# Patient Record
Sex: MALE | Race: WHITE | NOT HISPANIC OR LATINO | Employment: UNEMPLOYED | URBAN - METROPOLITAN AREA
[De-identification: names, ages, dates, MRNs, and addresses within clinical notes are randomized per-mention and may not be internally consistent; named-entity substitution may affect disease eponyms.]

---

## 2023-03-03 LAB
RBC, URINE: 1 /HPF (ref 0–5)
URINE CULTURE: NORMAL
WBC, URINE: 1 /HPF (ref 0–5)

## 2023-03-17 RX ORDER — TRAMADOL HYDROCHLORIDE 50 MG/1
1 TABLET ORAL 3 TIMES DAILY PRN
COMMUNITY
Start: 2016-04-20 | End: 2023-04-19

## 2023-04-18 DIAGNOSIS — M25.561 PAIN IN RIGHT KNEE: ICD-10-CM

## 2023-04-18 DIAGNOSIS — G70.00 MYASTHENIA GRAVIS WITHOUT (ACUTE) EXACERBATION (MULTI): ICD-10-CM

## 2023-04-19 RX ORDER — PREDNISONE 1 MG/1
TABLET ORAL
Qty: 120 TABLET | Refills: 0 | Status: SHIPPED | OUTPATIENT
Start: 2023-04-19 | End: 2023-05-24 | Stop reason: SDUPTHER

## 2023-04-19 RX ORDER — TRAMADOL HYDROCHLORIDE 50 MG/1
TABLET ORAL
Qty: 90 TABLET | Refills: 0 | Status: SHIPPED | OUTPATIENT
Start: 2023-04-19 | End: 2023-05-22

## 2023-04-19 RX ORDER — TRAMADOL HYDROCHLORIDE 50 MG/1
1 TABLET ORAL 3 TIMES DAILY
COMMUNITY
Start: 2016-04-20 | End: 2023-08-07 | Stop reason: SDUPTHER

## 2023-04-19 RX ORDER — PREDNISONE 1 MG/1
4 TABLET ORAL DAILY
COMMUNITY
Start: 2022-03-28 | End: 2023-06-21 | Stop reason: SDUPTHER

## 2023-04-24 DIAGNOSIS — E10.69 TYPE 1 DIABETES MELLITUS WITH OTHER SPECIFIED COMPLICATION (MULTI): ICD-10-CM

## 2023-04-24 RX ORDER — EMPAGLIFLOZIN 10 MG/1
1 TABLET, FILM COATED ORAL DAILY
COMMUNITY
Start: 2020-02-11 | End: 2023-04-24 | Stop reason: SDUPTHER

## 2023-04-24 RX ORDER — EMPAGLIFLOZIN 10 MG/1
10 TABLET, FILM COATED ORAL DAILY
Qty: 90 TABLET | Refills: 1 | Status: SHIPPED | OUTPATIENT
Start: 2023-04-24 | End: 2023-05-18

## 2023-05-12 LAB
ALANINE AMINOTRANSFERASE (SGPT) (U/L) IN SER/PLAS: 52 U/L (ref 10–52)
ALBUMIN (G/DL) IN SER/PLAS: 4 G/DL (ref 3.4–5)
ALKALINE PHOSPHATASE (U/L) IN SER/PLAS: 61 U/L (ref 33–136)
ANION GAP IN SER/PLAS: 14 MMOL/L (ref 10–20)
ASPARTATE AMINOTRANSFERASE (SGOT) (U/L) IN SER/PLAS: 25 U/L (ref 9–39)
BILIRUBIN TOTAL (MG/DL) IN SER/PLAS: 0.6 MG/DL (ref 0–1.2)
CALCIDIOL (25 OH VITAMIN D3) (NG/ML) IN SER/PLAS: 43 NG/ML
CALCIUM (MG/DL) IN SER/PLAS: 9.4 MG/DL (ref 8.6–10.6)
CARBON DIOXIDE, TOTAL (MMOL/L) IN SER/PLAS: 32 MMOL/L (ref 21–32)
CHLORIDE (MMOL/L) IN SER/PLAS: 99 MMOL/L (ref 98–107)
CHOLESTEROL (MG/DL) IN SER/PLAS: 220 MG/DL (ref 0–199)
CHOLESTEROL IN HDL (MG/DL) IN SER/PLAS: 43.8 MG/DL
CHOLESTEROL/HDL RATIO: 5
CREATININE (MG/DL) IN SER/PLAS: 0.86 MG/DL (ref 0.5–1.3)
ERYTHROCYTE DISTRIBUTION WIDTH (RATIO) BY AUTOMATED COUNT: 16.1 % (ref 11.5–14.5)
ERYTHROCYTE MEAN CORPUSCULAR HEMOGLOBIN CONCENTRATION (G/DL) BY AUTOMATED: 31.1 G/DL (ref 32–36)
ERYTHROCYTE MEAN CORPUSCULAR VOLUME (FL) BY AUTOMATED COUNT: 89 FL (ref 80–100)
ERYTHROCYTES (10*6/UL) IN BLOOD BY AUTOMATED COUNT: 5.59 X10E12/L (ref 4.5–5.9)
ESTIMATED AVERAGE GLUCOSE FOR HBA1C: 194 MG/DL
GFR MALE: >90 ML/MIN/1.73M2
GLUCOSE (MG/DL) IN SER/PLAS: 131 MG/DL (ref 74–99)
HEMATOCRIT (%) IN BLOOD BY AUTOMATED COUNT: 49.5 % (ref 41–52)
HEMOGLOBIN (G/DL) IN BLOOD: 15.4 G/DL (ref 13.5–17.5)
HEMOGLOBIN A1C/HEMOGLOBIN TOTAL IN BLOOD: 8.4 %
LDL: 139 MG/DL (ref 0–99)
LEUKOCYTES (10*3/UL) IN BLOOD BY AUTOMATED COUNT: 7.9 X10E9/L (ref 4.4–11.3)
NRBC (PER 100 WBCS) BY AUTOMATED COUNT: 0 /100 WBC (ref 0–0)
PLATELETS (10*3/UL) IN BLOOD AUTOMATED COUNT: 216 X10E9/L (ref 150–450)
POTASSIUM (MMOL/L) IN SER/PLAS: 4.2 MMOL/L (ref 3.5–5.3)
PROSTATE SPECIFIC AG (NG/ML) IN SER/PLAS: 4.76 NG/ML (ref 0–4)
PROTEIN TOTAL: 7.3 G/DL (ref 6.4–8.2)
SODIUM (MMOL/L) IN SER/PLAS: 141 MMOL/L (ref 136–145)
THYROTROPIN (MIU/L) IN SER/PLAS BY DETECTION LIMIT <= 0.05 MIU/L: 1.86 MIU/L (ref 0.44–3.98)
TRIGLYCERIDE (MG/DL) IN SER/PLAS: 187 MG/DL (ref 0–149)
UREA NITROGEN (MG/DL) IN SER/PLAS: 18 MG/DL (ref 6–23)
VLDL: 37 MG/DL (ref 0–40)

## 2023-05-15 ENCOUNTER — TELEPHONE (OUTPATIENT)
Dept: PRIMARY CARE | Facility: CLINIC | Age: 68
End: 2023-05-15
Payer: MEDICARE

## 2023-05-15 ENCOUNTER — DOCUMENTATION (OUTPATIENT)
Dept: PRIMARY CARE | Facility: CLINIC | Age: 68
End: 2023-05-15
Payer: MEDICARE

## 2023-05-15 NOTE — RESULT ENCOUNTER NOTE
Hemoglobin A1c at 8.4, above diabetic goal of 7.  We will need to discuss diabetic regimen at upcoming appointment, should be scheduled for 3-month appointment and may.  Please confirm.    LDL at 139, way above diabetic goal of 70.  Should be on Repatha given statin intolerance, as discussed at last appointment.  We will need to follow-up on this and deal with the pharmacist to get covered by insurance.    PSA borderline elevated, continue to follow with urology.    Remaining labs unremarkable.

## 2023-05-15 NOTE — TELEPHONE ENCOUNTER
----- Message from Christopher D'Amico, DO sent at 5/15/2023  9:03 AM EDT -----  Hemoglobin A1c at 8.4, above diabetic goal of 7.  We will need to discuss diabetic regimen at upcoming appointment, should be scheduled for 3-month appointment and may.  Please confirm.    LDL at 139, way above diabetic goal of 70.  Should be on Repatha given statin intolerance, as discussed at last appointment.  We will need to follow-up on this and deal with the pharmacist to get covered by insurance.    PSA borderline elevated, continue to follow with urology.    Remaining labs unremarkable.

## 2023-05-17 ENCOUNTER — OFFICE VISIT (OUTPATIENT)
Dept: PRIMARY CARE | Facility: CLINIC | Age: 68
End: 2023-05-17
Payer: MEDICARE

## 2023-05-17 ENCOUNTER — APPOINTMENT (OUTPATIENT)
Dept: PRIMARY CARE | Facility: CLINIC | Age: 68
End: 2023-05-17
Payer: MEDICARE

## 2023-05-17 VITALS
TEMPERATURE: 97.3 F | DIASTOLIC BLOOD PRESSURE: 72 MMHG | HEART RATE: 78 BPM | BODY MASS INDEX: 41.75 KG/M2 | SYSTOLIC BLOOD PRESSURE: 164 MMHG | RESPIRATION RATE: 17 BRPM | WEIGHT: 315 LBS | HEIGHT: 73 IN

## 2023-05-17 DIAGNOSIS — K21.9 GASTROESOPHAGEAL REFLUX DISEASE, UNSPECIFIED WHETHER ESOPHAGITIS PRESENT: ICD-10-CM

## 2023-05-17 DIAGNOSIS — I10 HYPERTENSION, UNSPECIFIED TYPE: ICD-10-CM

## 2023-05-17 DIAGNOSIS — E66.01 OBESITY, MORBID (MULTI): ICD-10-CM

## 2023-05-17 DIAGNOSIS — E11.9 TYPE 2 DIABETES MELLITUS WITHOUT COMPLICATION, WITHOUT LONG-TERM CURRENT USE OF INSULIN (MULTI): Primary | ICD-10-CM

## 2023-05-17 DIAGNOSIS — G70.00 OCULAR MYASTHENIA GRAVIS (MULTI): ICD-10-CM

## 2023-05-17 DIAGNOSIS — N52.9 ERECTILE DYSFUNCTION, UNSPECIFIED ERECTILE DYSFUNCTION TYPE: ICD-10-CM

## 2023-05-17 DIAGNOSIS — N40.0 BENIGN PROSTATIC HYPERPLASIA, UNSPECIFIED WHETHER LOWER URINARY TRACT SYMPTOMS PRESENT: ICD-10-CM

## 2023-05-17 DIAGNOSIS — R97.20 ELEVATED PSA: ICD-10-CM

## 2023-05-17 DIAGNOSIS — E78.5 HYPERLIPIDEMIA LDL GOAL <100: ICD-10-CM

## 2023-05-17 DIAGNOSIS — M54.50 ACUTE LOW BACK PAIN, UNSPECIFIED BACK PAIN LATERALITY, UNSPECIFIED WHETHER SCIATICA PRESENT: ICD-10-CM

## 2023-05-17 PROBLEM — G56.22 CUBITAL TUNNEL SYNDROME ON LEFT: Status: ACTIVE | Noted: 2023-05-17

## 2023-05-17 PROBLEM — M25.80 SESAMOIDITIS: Status: ACTIVE | Noted: 2023-05-17

## 2023-05-17 PROBLEM — L98.9 SKIN LESION: Status: ACTIVE | Noted: 2023-05-17

## 2023-05-17 PROBLEM — N40.1 BPH WITH OBSTRUCTION/LOWER URINARY TRACT SYMPTOMS: Status: ACTIVE | Noted: 2023-05-17

## 2023-05-17 PROBLEM — R10.11 ACUTE ABDOMINAL PAIN IN RIGHT UPPER QUADRANT: Status: ACTIVE | Noted: 2023-05-17

## 2023-05-17 PROBLEM — M23.92 INTERNAL DERANGEMENT OF LEFT KNEE: Status: ACTIVE | Noted: 2023-05-17

## 2023-05-17 PROBLEM — R19.7 ACUTE DIARRHEA: Status: ACTIVE | Noted: 2023-05-17

## 2023-05-17 PROBLEM — R10.30 GROIN PAIN: Status: ACTIVE | Noted: 2023-05-17

## 2023-05-17 PROBLEM — M62.81 MUSCLE WEAKNESS OF EXTREMITY: Status: ACTIVE | Noted: 2023-05-17

## 2023-05-17 PROBLEM — M76.62 ACHILLES TENDINITIS OF LEFT LOWER EXTREMITY: Status: ACTIVE | Noted: 2023-05-17

## 2023-05-17 PROBLEM — R52 GENERALIZED BODY ACHES: Status: ACTIVE | Noted: 2023-05-17

## 2023-05-17 PROBLEM — R35.0 INCREASED FREQUENCY OF URINATION: Status: ACTIVE | Noted: 2023-05-17

## 2023-05-17 PROBLEM — H02.401 PTOSIS OF RIGHT EYELID: Status: ACTIVE | Noted: 2023-05-17

## 2023-05-17 PROBLEM — R63.5 WEIGHT GAIN: Status: ACTIVE | Noted: 2023-05-17

## 2023-05-17 PROBLEM — G47.00 INSOMNIA: Status: ACTIVE | Noted: 2023-05-17

## 2023-05-17 PROBLEM — G89.29 CHRONIC BACK PAIN: Status: ACTIVE | Noted: 2023-05-17

## 2023-05-17 PROBLEM — M72.2 PLANTAR FASCIITIS, LEFT: Status: ACTIVE | Noted: 2023-05-17

## 2023-05-17 PROBLEM — H51.11 CONVERGENCE INSUFFICIENCY: Status: ACTIVE | Noted: 2023-05-17

## 2023-05-17 PROBLEM — H52.7 REFRACTIVE ERROR: Status: ACTIVE | Noted: 2023-05-17

## 2023-05-17 PROBLEM — M94.261 CHONDROMALACIA OF KNEE, RIGHT: Status: ACTIVE | Noted: 2023-05-17

## 2023-05-17 PROBLEM — R41.840 ATTENTION AND CONCENTRATION DEFICIT: Status: ACTIVE | Noted: 2023-05-17

## 2023-05-17 PROBLEM — M21.6X2 PRONATION OF BOTH FEET: Status: ACTIVE | Noted: 2023-05-17

## 2023-05-17 PROBLEM — M67.02 ACQUIRED SHORT ACHILLES TENDON OF LEFT LOWER EXTREMITY: Status: ACTIVE | Noted: 2023-05-17

## 2023-05-17 PROBLEM — N13.8 BPH WITH OBSTRUCTION/LOWER URINARY TRACT SYMPTOMS: Status: ACTIVE | Noted: 2023-05-17

## 2023-05-17 PROBLEM — H53.8 BLURRED VISION, BILATERAL: Status: ACTIVE | Noted: 2023-05-17

## 2023-05-17 PROBLEM — M17.12 PRIMARY LOCALIZED OSTEOARTHROSIS OF LEFT LOWER LEG: Status: ACTIVE | Noted: 2023-05-17

## 2023-05-17 PROBLEM — F17.201 NICOTINE DEPENDENCE IN REMISSION: Status: ACTIVE | Noted: 2023-05-17

## 2023-05-17 PROBLEM — H53.2 DIPLOPIA: Status: ACTIVE | Noted: 2023-05-17

## 2023-05-17 PROBLEM — M77.50 BONE SPUR OF FOOT: Status: ACTIVE | Noted: 2023-05-17

## 2023-05-17 PROBLEM — M25.561 RIGHT KNEE PAIN: Status: ACTIVE | Noted: 2023-05-17

## 2023-05-17 PROBLEM — M25.532 LEFT WRIST PAIN: Status: ACTIVE | Noted: 2023-05-17

## 2023-05-17 PROBLEM — M54.9 CHRONIC BACK PAIN: Status: ACTIVE | Noted: 2023-05-17

## 2023-05-17 PROBLEM — S80.00XA CONTUSION OF KNEE: Status: ACTIVE | Noted: 2023-05-17

## 2023-05-17 PROBLEM — M62.830 BACK SPASM: Status: ACTIVE | Noted: 2023-05-17

## 2023-05-17 PROBLEM — H25.11 AGE-RELATED NUCLEAR CATARACT OF RIGHT EYE: Status: ACTIVE | Noted: 2023-05-17

## 2023-05-17 PROBLEM — M21.6X1 PRONATION OF BOTH FEET: Status: ACTIVE | Noted: 2023-05-17

## 2023-05-17 PROBLEM — R10.10 PAIN OF UPPER ABDOMEN: Status: ACTIVE | Noted: 2023-05-17

## 2023-05-17 PROBLEM — M25.529 ELBOW PAIN: Status: ACTIVE | Noted: 2023-05-17

## 2023-05-17 PROBLEM — H26.9 CATARACTS, BOTH EYES: Status: ACTIVE | Noted: 2023-05-17

## 2023-05-17 PROBLEM — L30.9 DERMATITIS, ECZEMATOID: Status: ACTIVE | Noted: 2023-05-17

## 2023-05-17 PROBLEM — H25.12 AGE-RELATED NUCLEAR CATARACT OF LEFT EYE: Status: ACTIVE | Noted: 2023-05-17

## 2023-05-17 PROBLEM — E55.9 VITAMIN D DEFICIENCY: Status: ACTIVE | Noted: 2023-05-17

## 2023-05-17 PROBLEM — R20.0 LEFT UPPER EXTREMITY NUMBNESS: Status: ACTIVE | Noted: 2023-05-17

## 2023-05-17 PROBLEM — M79.10 MYALGIA DUE TO STATIN: Status: ACTIVE | Noted: 2023-05-17

## 2023-05-17 PROBLEM — J01.90 ACUTE SINUSITIS: Status: ACTIVE | Noted: 2023-05-17

## 2023-05-17 PROBLEM — R53.83 FATIGUE: Status: ACTIVE | Noted: 2023-05-17

## 2023-05-17 PROBLEM — K43.9 HERNIA OF ANTERIOR ABDOMINAL WALL: Status: ACTIVE | Noted: 2019-05-17

## 2023-05-17 PROBLEM — M25.569 JOINT PAIN, KNEE: Status: ACTIVE | Noted: 2023-05-17

## 2023-05-17 PROBLEM — E53.8 VITAMIN B12 DEFICIENCY: Status: ACTIVE | Noted: 2023-05-17

## 2023-05-17 PROBLEM — M25.519 SHOULDER PAIN: Status: ACTIVE | Noted: 2023-05-17

## 2023-05-17 PROBLEM — R19.09 INGUINAL BULGE: Status: ACTIVE | Noted: 2023-05-17

## 2023-05-17 PROBLEM — T46.6X5A MYALGIA DUE TO STATIN: Status: ACTIVE | Noted: 2023-05-17

## 2023-05-17 PROBLEM — M79.2 NEUROPATHIC PAIN OF LEFT HAND: Status: ACTIVE | Noted: 2023-05-17

## 2023-05-17 PROCEDURE — 99214 OFFICE O/P EST MOD 30 MIN: CPT | Performed by: STUDENT IN AN ORGANIZED HEALTH CARE EDUCATION/TRAINING PROGRAM

## 2023-05-17 PROCEDURE — 3052F HG A1C>EQUAL 8.0%<EQUAL 9.0%: CPT | Performed by: STUDENT IN AN ORGANIZED HEALTH CARE EDUCATION/TRAINING PROGRAM

## 2023-05-17 PROCEDURE — 3078F DIAST BP <80 MM HG: CPT | Performed by: STUDENT IN AN ORGANIZED HEALTH CARE EDUCATION/TRAINING PROGRAM

## 2023-05-17 PROCEDURE — 3077F SYST BP >= 140 MM HG: CPT | Performed by: STUDENT IN AN ORGANIZED HEALTH CARE EDUCATION/TRAINING PROGRAM

## 2023-05-17 PROCEDURE — 1159F MED LIST DOCD IN RCRD: CPT | Performed by: STUDENT IN AN ORGANIZED HEALTH CARE EDUCATION/TRAINING PROGRAM

## 2023-05-17 PROCEDURE — 1160F RVW MEDS BY RX/DR IN RCRD: CPT | Performed by: STUDENT IN AN ORGANIZED HEALTH CARE EDUCATION/TRAINING PROGRAM

## 2023-05-17 RX ORDER — METHYLPREDNISOLONE ACETATE 80 MG/ML
80 INJECTION, SUSPENSION INTRA-ARTICULAR; INTRALESIONAL; INTRAMUSCULAR; SOFT TISSUE ONCE
Status: SHIPPED | OUTPATIENT
Start: 2023-05-17

## 2023-05-17 RX ORDER — IBUPROFEN 100 MG/5ML
1 SUSPENSION, ORAL (FINAL DOSE FORM) ORAL DAILY
COMMUNITY
Start: 2016-04-06

## 2023-05-17 RX ORDER — CHOLECALCIFEROL (VITAMIN D3) 25 MCG
TABLET,CHEWABLE ORAL
COMMUNITY
Start: 2016-07-06

## 2023-05-17 RX ORDER — INSULIN GLARGINE 100 [IU]/ML
100 INJECTION, SOLUTION SUBCUTANEOUS NIGHTLY
COMMUNITY
End: 2023-08-16 | Stop reason: ALTCHOICE

## 2023-05-17 RX ORDER — PREGABALIN 50 MG/1
50 CAPSULE ORAL 3 TIMES DAILY
COMMUNITY
End: 2023-05-24 | Stop reason: SDUPTHER

## 2023-05-17 RX ORDER — CHOLECALCIFEROL (VITAMIN D3) 25 MCG
1 TABLET ORAL DAILY
COMMUNITY

## 2023-05-17 RX ORDER — PERPHENAZINE 16 MG
1 TABLET ORAL DAILY
COMMUNITY
Start: 2016-07-06

## 2023-05-17 RX ORDER — IBUPROFEN 100 MG/5ML
1000 SUSPENSION, ORAL (FINAL DOSE FORM) ORAL
COMMUNITY
End: 2023-05-24 | Stop reason: SDUPTHER

## 2023-05-17 RX ORDER — BLOOD SUGAR DIAGNOSTIC
STRIP MISCELLANEOUS
COMMUNITY
Start: 2018-09-13 | End: 2023-05-24 | Stop reason: ALTCHOICE

## 2023-05-17 RX ORDER — INSULIN LISPRO 100 [IU]/ML
12 INJECTION, SOLUTION SUBCUTANEOUS
COMMUNITY
End: 2023-06-21 | Stop reason: SDUPTHER

## 2023-05-17 RX ORDER — MULTIVITAMIN
1 TABLET ORAL
COMMUNITY

## 2023-05-17 RX ORDER — GLUCOSAMINE/D3/BOSWELLIA SERRA 1500MG-400
TABLET ORAL
COMMUNITY
Start: 2022-03-24

## 2023-05-17 RX ORDER — VIT C/E/ZN/COPPR/LUTEIN/ZEAXAN 250MG-90MG
1000 CAPSULE ORAL
COMMUNITY
End: 2023-05-24 | Stop reason: SDUPTHER

## 2023-05-17 RX ORDER — METFORMIN HYDROCHLORIDE 1000 MG/1
1000 TABLET ORAL
COMMUNITY
End: 2023-06-20 | Stop reason: SDUPTHER

## 2023-05-17 RX ORDER — PREGABALIN 75 MG/1
75 CAPSULE ORAL 2 TIMES DAILY
COMMUNITY
End: 2023-12-26 | Stop reason: SDUPTHER

## 2023-05-17 RX ORDER — TAMSULOSIN HYDROCHLORIDE 0.4 MG/1
2 CAPSULE ORAL DAILY
COMMUNITY
Start: 2013-10-24 | End: 2023-06-20 | Stop reason: SDUPTHER

## 2023-05-17 RX ORDER — ORPHENADRINE CITRATE 100 MG/1
100 TABLET, EXTENDED RELEASE ORAL DAILY
COMMUNITY
End: 2023-06-09 | Stop reason: SDUPTHER

## 2023-05-17 RX ORDER — PYRIDOSTIGMINE BROMIDE 60 MG/1
90 TABLET ORAL 3 TIMES DAILY
COMMUNITY
End: 2024-05-06

## 2023-05-17 RX ORDER — SUCRALFATE 1 G/1
TABLET ORAL
COMMUNITY
Start: 2019-03-12 | End: 2023-10-31 | Stop reason: SDUPTHER

## 2023-05-17 RX ORDER — KETOROLAC TROMETHAMINE 30 MG/ML
60 INJECTION, SOLUTION INTRAMUSCULAR; INTRAVENOUS ONCE
Status: SHIPPED | OUTPATIENT
Start: 2023-05-17 | End: 2023-05-22

## 2023-05-17 RX ORDER — PEN NEEDLE, DIABETIC 32 GX 1/4"
NEEDLE, DISPOSABLE MISCELLANEOUS
COMMUNITY
Start: 2023-05-01 | End: 2023-06-21 | Stop reason: SDUPTHER

## 2023-05-17 ASSESSMENT — PAIN SCALES - GENERAL: PAINLEVEL: 10-WORST PAIN EVER

## 2023-05-17 NOTE — PROGRESS NOTES
"67-year-old male presenting for follow-up on chronic conditions:    DMII  Still taking same insulin regimen, never started GLP-1, only checking blood sugar periodically, reports that it is \"good.\"    HTN  Currently not taking any medications    HLD  Currently unmedicated, reports significant statin intolerance, recommended Repatha, has not done yet.    Acute on chronic back pain  Reports slipping and falling, nothing is helping with the pain.  Take steroid burst in the past for acute flares, requesting today.     12 point ROS reviewed and negative other than as stated in HPI    General: Alert, oriented, pleasant, in no mild distress  HEENT:      Head: normocephalic, atraumatic;      eyes: EOMI, no scleral icterus;   CV: Heart with regular rate and rhythm, normal S1/S2, no murmurs  Lungs: CTAB without wheezing, rhonchi or rales; good respiratory effort, no increased work of breathing  Neuro: Cranial nerves grossly intact; alert and oriented  Psych: Appropriate mood and affect      1.  DMII  -Last A1C: 7.7 Date: 10/07/2022  -Last Albumin/Creat ration: WNL Date: 10/21/21  -Diabetic foot exam: Date:  -Diabetic eye exam: two years ago, ophthalmology referral today  -Current Medications: Lantus 100 units at bedtime, Humalog 12 units with meals, metformin 1000 mg twice daily, Jardiance 10 mg daily  -Statin: None, reports allergy to all statins  -ASA: None  -ACE/ARB: None  -Nutritionist: Referral pending  -Medication changes: Pharmacist met with patient today to discuss initiation of GLP-1, and hopefully reduction in insulin burden, will use continuous glucose monitoring, as there is concern for overuse of insulin and Somogyi effect. Patient is going to start on Ozempic and follow with pharmacy    2.  HTN  -Recommend treatment, declining at this time, significant pain may be contributing, but most office visit readings are above goal    3. HLD  -Has not tolerated every statin  -Recommend Repatha, discussing with " pharmacist     4. BPH  -Continue tamsulosin 0.4 mg 2 tablets daily, following with urology     5.  Acute on chronic low back pain 6. Chronic L knee pain  -CSA and UDS completed today  -Continue tramadol 50 mg 3 times daily as needed  -Depo-Medrol 80 mg IM, Toradol 60 mg IM  -Appropriate warnings given about no other NSAID usage today, and to watch sugars closely     7. ED  -Continue tadalafil 20 mg as needed     8. GERD  -Continue pantoprazole 40 mg daily     9. Ocular MG  -Continue to follow with neurology  -Current medications include prednisone 4 mg daily, pyridostigmine 60 mg 3 times daily    Follow-up 3 months     Christopher D'Amico,

## 2023-05-18 ENCOUNTER — TELEMEDICINE (OUTPATIENT)
Dept: PHARMACY | Facility: HOSPITAL | Age: 68
End: 2023-05-18
Payer: MEDICARE

## 2023-05-18 DIAGNOSIS — E78.5 HYPERLIPIDEMIA LDL GOAL <100: ICD-10-CM

## 2023-05-18 DIAGNOSIS — E11.9 TYPE 2 DIABETES MELLITUS WITHOUT COMPLICATION, WITHOUT LONG-TERM CURRENT USE OF INSULIN (MULTI): Primary | ICD-10-CM

## 2023-05-18 RX ORDER — EVOLOCUMAB 140 MG/ML
140 INJECTION, SOLUTION SUBCUTANEOUS
Qty: 2 ML | Refills: 11 | Status: SHIPPED | OUTPATIENT
Start: 2023-05-18 | End: 2023-05-18

## 2023-05-18 RX ORDER — SEMAGLUTIDE 0.68 MG/ML
0.25 INJECTION, SOLUTION SUBCUTANEOUS
Qty: 3 ML | Refills: 0 | Status: SHIPPED | OUTPATIENT
Start: 2023-05-18 | End: 2023-07-05 | Stop reason: DRUGHIGH

## 2023-05-18 NOTE — PROGRESS NOTES
Pharmacist Clinic: Diabetes Management  Shawn Romero is a 67 y.o. male was referred to Clinical Pharmacy Team for diabetes management. At last visit, ozempic was to be initiated - patient did not answer pharmacy team follow up calls. Patient communicates best via e-mail and scheduled calls.    Referring Provider: Christopher D'Amico, DO     HISTORY OF PRESENT ILLNESS  Patient is an insulin dependent T2DM. His meds are high cost and he is hesitant to start new ones d/t cost.    LAB REVIEW   Glucose (mg/dL)   Date Value   05/12/2023 131 (H)   10/07/2022 179 (H)   06/21/2022 142 (H)     Hemoglobin A1C (%)   Date Value   05/12/2023 8.4 (A)   10/07/2022 7.7 (A)   06/21/2022 8.1 (A)     Bicarbonate (mmol/L)   Date Value   05/12/2023 32   10/07/2022 31   06/21/2022 33 (H)     Urea Nitrogen (mg/dL)   Date Value   05/12/2023 18   10/07/2022 12   06/21/2022 21     Creatinine (mg/dL)   Date Value   05/12/2023 0.86   10/07/2022 0.89   06/21/2022 0.99     Lab Results   Component Value Date    HGBA1C 8.4 (A) 05/12/2023    HGBA1C 7.7 (A) 10/07/2022    HGBA1C 8.1 (A) 06/21/2022     Lab Results   Component Value Date    CHOL 220 (H) 05/12/2023    CHOL 229 (H) 10/07/2022    CHOL 243 (H) 06/21/2022     Lab Results   Component Value Date    HDL 43.8 05/12/2023    HDL 48.9 10/07/2022    HDL 43.6 06/21/2022     Lab Results   Component Value Date    TRIG 187 (H) 05/12/2023    TRIG 195 (H) 10/07/2022    TRIG 202 (H) 06/21/2022     DIABETES ASSESSMENT    CURRENT PHARMACOTHERAPY  - metformin 1000 mg 1 by mouth twice daily   - lantus 100U/3ml 100 units nightly   - jardiance 10 mg daily   - humalog 12 units three times daily    SECONDARY PREVENTION  - Statin? No; patient cannot tolerate statins  - ACE-I/ARB? No    SMBG MEASUREMENTS:   - patient only tests when he feels like his sugars are high or low    Patient does not report symptoms of hypoglycemia. Patient denies dizziness and sweating.  Patient does not report symptoms of hyperglycemia.  Patient denies excessive thirst, fatigue, and polyuria.    RECOMMENDATIONS/PLAN  1. Patients diabetes is poorly controlled with most recent A1c of 8.4 % (goal < 7 %).   - Continue all meds under the continuation of care with the referring provider and clinical pharmacy team.  - Initiate ozempic 0.25 mg under the skin once weekly and plan to titrate to max tolerated dosage   - Initiate repatha 140 mg under the skin every other week  - High cost meds sent to [facility] pharmacy for [facility]    Clinical Pharmacist follow up: 5/23 @ 1 PM    Thank you,  Janessa Caro, PharmD    Verbal consent to manage patient's drug therapy was obtained from the patient. They were informed they may decline to participate or withdraw from participation in pharmacy services at any time.

## 2023-05-21 DIAGNOSIS — M25.561 PAIN IN RIGHT KNEE: ICD-10-CM

## 2023-05-22 RX ORDER — TRAMADOL HYDROCHLORIDE 50 MG/1
TABLET ORAL
Qty: 90 TABLET | Refills: 0 | Status: SHIPPED | OUTPATIENT
Start: 2023-05-22 | End: 2023-05-24 | Stop reason: SDUPTHER

## 2023-05-23 ENCOUNTER — APPOINTMENT (OUTPATIENT)
Dept: PHARMACY | Facility: HOSPITAL | Age: 68
End: 2023-05-23
Payer: MEDICARE

## 2023-05-24 ENCOUNTER — TELEMEDICINE (OUTPATIENT)
Dept: PHARMACY | Facility: HOSPITAL | Age: 68
End: 2023-05-24
Payer: MEDICARE

## 2023-05-24 DIAGNOSIS — E11.9 TYPE 2 DIABETES MELLITUS WITHOUT COMPLICATION, WITHOUT LONG-TERM CURRENT USE OF INSULIN (MULTI): Primary | ICD-10-CM

## 2023-05-24 DIAGNOSIS — E78.5 HYPERLIPIDEMIA LDL GOAL <100: ICD-10-CM

## 2023-05-24 RX ORDER — FLASH GLUCOSE SCANNING READER
EACH MISCELLANEOUS
Qty: 1 EACH | Refills: 0 | Status: SHIPPED | OUTPATIENT
Start: 2023-05-24

## 2023-05-24 RX ORDER — FLASH GLUCOSE SENSOR
KIT MISCELLANEOUS
Qty: 2 EACH | Refills: 11 | Status: SHIPPED | OUTPATIENT
Start: 2023-05-24 | End: 2023-05-24

## 2023-05-24 NOTE — PROGRESS NOTES
Pharmacist Clinic: Diabetes Management  Shawn Romero is a 67 y.o. male was referred to Clinical Pharmacy Team for diabetes management.     Referring Provider: Christopher D'Amico, DO     HISTORY OF PRESENT ILLNESS  Patient is an insulin dependent T2DM. His meds are high cost and he is hesitant to start new ones d/t cost.  Patient is approved for the  PAP.     LAB REVIEW   Glucose (mg/dL)   Date Value   05/12/2023 131 (H)   10/07/2022 179 (H)   06/21/2022 142 (H)     Hemoglobin A1C (%)   Date Value   05/12/2023 8.4 (A)   10/07/2022 7.7 (A)   06/21/2022 8.1 (A)     Bicarbonate (mmol/L)   Date Value   05/12/2023 32   10/07/2022 31   06/21/2022 33 (H)     Urea Nitrogen (mg/dL)   Date Value   05/12/2023 18   10/07/2022 12   06/21/2022 21     Creatinine (mg/dL)   Date Value   05/12/2023 0.86   10/07/2022 0.89   06/21/2022 0.99     Lab Results   Component Value Date    HGBA1C 8.4 (A) 05/12/2023    HGBA1C 7.7 (A) 10/07/2022    HGBA1C 8.1 (A) 06/21/2022     Lab Results   Component Value Date    CHOL 220 (H) 05/12/2023    CHOL 229 (H) 10/07/2022    CHOL 243 (H) 06/21/2022     Lab Results   Component Value Date    HDL 43.8 05/12/2023    HDL 48.9 10/07/2022    HDL 43.6 06/21/2022     Lab Results   Component Value Date    TRIG 187 (H) 05/12/2023    TRIG 195 (H) 10/07/2022    TRIG 202 (H) 06/21/2022     DIABETES ASSESSMENT    CURRENT PHARMACOTHERAPY  - metformin 1000 mg 1 by mouth twice daily   - lantus 100U/3ml 100 units nightly   - jardiance 10 mg daily   - humalog 12 units three times daily    SECONDARY PREVENTION  - Statin? No; patient cannot tolerate statins  - ACE-I/ARB? No    SMBG MEASUREMENTS:   - patient only tests when he feels like his sugars are high or low    Patient does not report symptoms of hypoglycemia. Patient denies dizziness and sweating.  Patient does not report symptoms of hyperglycemia. Patient denies excessive thirst, fatigue, and polyuria.    RECOMMENDATIONS/PLAN  1. Patients diabetes is poorly  controlled with most recent A1c of 8.4 % (goal < 7 %).   - Continue all meds under the continuation of care with the referring provider and clinical pharmacy team.  - Initiate ozempic 0.25 mg under the skin once weekly and plan to titrate to max tolerated dosage. Will be delivered to patient this week.   - Initiate repatha 140 mg under the skin every other week. Will be delivered to patient this week.   - Start using the CGM to test your blood sugar.     Clinical Pharmacist follow up: 6/7 @ 11 AM    Thank you,  Janessa Caro, PharmD    Verbal consent to manage patient's drug therapy was obtained from the patient. They were informed they may decline to participate or withdraw from participation in pharmacy services at any time.

## 2023-05-30 ENCOUNTER — TELEPHONE (OUTPATIENT)
Dept: PRIMARY CARE | Facility: CLINIC | Age: 68
End: 2023-05-30
Payer: MEDICARE

## 2023-05-31 DIAGNOSIS — M54.40 LOW BACK PAIN WITH SCIATICA, SCIATICA LATERALITY UNSPECIFIED, UNSPECIFIED BACK PAIN LATERALITY, UNSPECIFIED CHRONICITY: ICD-10-CM

## 2023-05-31 RX ORDER — ORPHENADRINE CITRATE 100 MG/1
100 TABLET, EXTENDED RELEASE ORAL DAILY
Qty: 90 TABLET | Refills: 0 | OUTPATIENT
Start: 2023-05-31

## 2023-06-07 ENCOUNTER — TELEMEDICINE (OUTPATIENT)
Dept: PHARMACY | Facility: HOSPITAL | Age: 68
End: 2023-06-07
Payer: MEDICARE

## 2023-06-07 DIAGNOSIS — E11.9 TYPE 2 DIABETES MELLITUS WITHOUT COMPLICATION, WITHOUT LONG-TERM CURRENT USE OF INSULIN (MULTI): Primary | ICD-10-CM

## 2023-06-07 NOTE — PROGRESS NOTES
Pharmacist Clinic: Diabetes Management  Shawn Romero is a 67 y.o. male was referred to Clinical Pharmacy Team for diabetes management. Since last visit, patient was approved for the  PAP for his repatha, jardiance, and ozempic.     Referring Provider: Christopher D'Amico,      LAB REVIEW   Glucose (mg/dL)   Date Value   05/12/2023 131 (H)   10/07/2022 179 (H)   06/21/2022 142 (H)     Hemoglobin A1C (%)   Date Value   05/12/2023 8.4 (A)   10/07/2022 7.7 (A)   06/21/2022 8.1 (A)     Bicarbonate (mmol/L)   Date Value   05/12/2023 32   10/07/2022 31   06/21/2022 33 (H)     Urea Nitrogen (mg/dL)   Date Value   05/12/2023 18   10/07/2022 12   06/21/2022 21     Creatinine (mg/dL)   Date Value   05/12/2023 0.86   10/07/2022 0.89   06/21/2022 0.99     Lab Results   Component Value Date    HGBA1C 8.4 (A) 05/12/2023    HGBA1C 7.7 (A) 10/07/2022    HGBA1C 8.1 (A) 06/21/2022     Lab Results   Component Value Date    CHOL 220 (H) 05/12/2023    CHOL 229 (H) 10/07/2022    CHOL 243 (H) 06/21/2022     Lab Results   Component Value Date    HDL 43.8 05/12/2023    HDL 48.9 10/07/2022    HDL 43.6 06/21/2022     Lab Results   Component Value Date    TRIG 187 (H) 05/12/2023    TRIG 195 (H) 10/07/2022    TRIG 202 (H) 06/21/2022     DIABETES ASSESSMENT    CURRENT PHARMACOTHERAPY  - metformin 1000 mg 1 by mouth twice daily   - lantus 100U/3ml 100 units nightly   - jardiance 10 mg daily   - humalog 12 units three times daily  - ozempic 0.25 mg under the skin once weekly, first dose to be given today    SECONDARY PREVENTION  - Statin? No; patient cannot tolerate statins  -- repatha started today (6/7)  - ACE-I/ARB? No    SMBG MEASUREMENTS:   - patient has not been testing his sugars since last speaking     Patient does not report symptoms of hypoglycemia. Patient denies dizziness and sweating.  Patient does not report symptoms of hyperglycemia. Patient denies excessive thirst, fatigue, and polyuria.    Patient inquired about his msucle  relaxer, he says his old neurologist took him off soma and put him on norflex. Advised he should defer to current neurologist (dr. Charbel burr) for a safe muscle relaxer given his MG.     RECOMMENDATIONS/PLAN  1. Patients diabetes is poorly controlled with most recent A1c of 8.4 % (goal < 7 %).   - Continue all meds under the continuation of care with the referring provider and clinical pharmacy team.  - Since last speaking, patient did not start his new medications. Discussed how to use medications today, patient will start them today.     Clinical Pharmacist follow up: 6/21 @ 11 AM    Thank you,  Janessa Caro, PharmD    Verbal consent to manage patient's drug therapy was obtained from the patient. They were informed they may decline to participate or withdraw from participation in pharmacy services at any time.

## 2023-06-09 DIAGNOSIS — M54.50 ACUTE LOW BACK PAIN, UNSPECIFIED BACK PAIN LATERALITY, UNSPECIFIED WHETHER SCIATICA PRESENT: Primary | ICD-10-CM

## 2023-06-09 RX ORDER — ORPHENADRINE CITRATE 100 MG/1
100 TABLET, EXTENDED RELEASE ORAL DAILY
Qty: 30 TABLET | Refills: 1 | Status: SHIPPED | OUTPATIENT
Start: 2023-06-09 | End: 2023-08-07 | Stop reason: SDUPTHER

## 2023-06-20 DIAGNOSIS — N40.0 BENIGN PROSTATIC HYPERPLASIA, UNSPECIFIED WHETHER LOWER URINARY TRACT SYMPTOMS PRESENT: Primary | ICD-10-CM

## 2023-06-20 DIAGNOSIS — E11.69 TYPE 2 DIABETES MELLITUS WITH OTHER SPECIFIED COMPLICATION, UNSPECIFIED WHETHER LONG TERM INSULIN USE (MULTI): ICD-10-CM

## 2023-06-20 RX ORDER — METFORMIN HYDROCHLORIDE 1000 MG/1
1000 TABLET ORAL
Qty: 60 TABLET | Refills: 3 | Status: SHIPPED | OUTPATIENT
Start: 2023-06-20 | End: 2023-10-31 | Stop reason: SDUPTHER

## 2023-06-20 RX ORDER — TAMSULOSIN HYDROCHLORIDE 0.4 MG/1
0.8 CAPSULE ORAL DAILY
Qty: 180 CAPSULE | Refills: 1 | Status: SHIPPED | OUTPATIENT
Start: 2023-06-20 | End: 2023-07-24 | Stop reason: SDUPTHER

## 2023-06-21 ENCOUNTER — TELEMEDICINE (OUTPATIENT)
Dept: PHARMACY | Facility: HOSPITAL | Age: 68
End: 2023-06-21
Payer: MEDICARE

## 2023-06-21 DIAGNOSIS — E11.9 TYPE 2 DIABETES MELLITUS WITHOUT COMPLICATION, WITHOUT LONG-TERM CURRENT USE OF INSULIN (MULTI): Primary | ICD-10-CM

## 2023-06-21 RX ORDER — PEN NEEDLE, DIABETIC 32 GX 1/4"
NEEDLE, DISPOSABLE MISCELLANEOUS
Qty: 100 EACH | Refills: 11 | Status: SHIPPED | OUTPATIENT
Start: 2023-06-21 | End: 2023-08-16 | Stop reason: ALTCHOICE

## 2023-06-21 RX ORDER — PREDNISONE 1 MG/1
4 TABLET ORAL DAILY
Qty: 120 TABLET | Refills: 0 | Status: SHIPPED | OUTPATIENT
Start: 2023-06-21 | End: 2023-10-25 | Stop reason: SDUPTHER

## 2023-06-21 RX ORDER — INSULIN LISPRO 100 [IU]/ML
12 INJECTION, SOLUTION SUBCUTANEOUS
Qty: 30 ML | Refills: 1 | Status: SHIPPED | OUTPATIENT
Start: 2023-06-21 | End: 2023-08-16 | Stop reason: SDUPTHER

## 2023-06-21 RX ORDER — PEN NEEDLE, DIABETIC 30 GX3/16"
NEEDLE, DISPOSABLE MISCELLANEOUS
Qty: 100 EACH | Refills: 11 | Status: SHIPPED | OUTPATIENT
Start: 2023-06-21 | End: 2023-08-16 | Stop reason: ALTCHOICE

## 2023-06-21 NOTE — PROGRESS NOTES
Pharmacist Clinic: Diabetes Management  Shawn Romero is a 67 y.o. male was referred to Clinical Pharmacy Team for diabetes management. Since last visit, patient started ozempic and repatha.     Referring Provider: Christopher D'Amico, DO     LAB REVIEW   Glucose (mg/dL)   Date Value   06/16/2023 151 (H)   05/12/2023 131 (H)   10/07/2022 179 (H)     Hemoglobin A1C (%)   Date Value   05/12/2023 8.4 (A)   10/07/2022 7.7 (A)   06/21/2022 8.1 (A)     Bicarbonate (mmol/L)   Date Value   06/16/2023 36 (H)   05/12/2023 32   10/07/2022 31     Urea Nitrogen (mg/dL)   Date Value   06/16/2023 18   05/12/2023 18   10/07/2022 12     Creatinine (mg/dL)   Date Value   06/16/2023 0.92   05/12/2023 0.86   10/07/2022 0.89     Lab Results   Component Value Date    HGBA1C 8.4 (A) 05/12/2023    HGBA1C 7.7 (A) 10/07/2022    HGBA1C 8.1 (A) 06/21/2022     Lab Results   Component Value Date    CHOL 220 (H) 05/12/2023    CHOL 229 (H) 10/07/2022    CHOL 243 (H) 06/21/2022     Lab Results   Component Value Date    HDL 43.8 05/12/2023    HDL 48.9 10/07/2022    HDL 43.6 06/21/2022     Lab Results   Component Value Date    TRIG 187 (H) 05/12/2023    TRIG 195 (H) 10/07/2022    TRIG 202 (H) 06/21/2022     DIABETES ASSESSMENT    CURRENT PHARMACOTHERAPY  - metformin 1000 mg 1 by mouth twice daily   - lantus 100U/3ml 100 units nightly   - jardiance 10 mg daily   - humalog 12 units three times daily  - ozempic 0.25 mg under the skin once weekly (injects on wednesdays)    SECONDARY PREVENTION  - Statin? No; patient cannot tolerate statins, on PCSK9i  - ACE-I/ARB? No    SMBG MEASUREMENTS:   - last weeks AVG of 168  - week prior AVG of 180    DISCUSSION  - Patient is tolerating ozempic, he states his appetite is down. He is down 9 pounds.   - Patient loves his CGM, he states he is able to check his BG all day long and it helps him stay organized   - Discussed increasing his dose of ozempic to 0.5 mg once weekly, patient is eager to do so      RECOMMENDATIONS/PLAN  1. Patients diabetes is poorly controlled with most recent A1c of 8.4 % (goal < 7 %).   - Continue all meds under the continuation of care with the referring provider and clinical pharmacy team.  - Increase ozempic to 0.5 mg under the skin once weekly.    Clinical Pharmacist follow up: 7/12 @ 11 AM    Thank you,  Janessa Caro, PharmD    Verbal consent to manage patient's drug therapy was obtained from the patient. They were informed they may decline to participate or withdraw from participation in pharmacy services at any time.

## 2023-06-27 ENCOUNTER — HOSPITAL ENCOUNTER (OUTPATIENT)
Dept: DATA CONVERSION | Facility: HOSPITAL | Age: 68
End: 2023-06-27
Attending: STUDENT IN AN ORGANIZED HEALTH CARE EDUCATION/TRAINING PROGRAM | Admitting: STUDENT IN AN ORGANIZED HEALTH CARE EDUCATION/TRAINING PROGRAM
Payer: MEDICARE

## 2023-06-27 DIAGNOSIS — K21.9 GASTRO-ESOPHAGEAL REFLUX DISEASE WITHOUT ESOPHAGITIS: ICD-10-CM

## 2023-06-27 DIAGNOSIS — Z79.84 LONG TERM (CURRENT) USE OF ORAL HYPOGLYCEMIC DRUGS: ICD-10-CM

## 2023-06-27 DIAGNOSIS — G70.00 MYASTHENIA GRAVIS WITHOUT (ACUTE) EXACERBATION (MULTI): ICD-10-CM

## 2023-06-27 DIAGNOSIS — Z88.0 ALLERGY STATUS TO PENICILLIN: ICD-10-CM

## 2023-06-27 DIAGNOSIS — Z79.4 LONG TERM (CURRENT) USE OF INSULIN (MULTI): ICD-10-CM

## 2023-06-27 DIAGNOSIS — E78.5 HYPERLIPIDEMIA, UNSPECIFIED: ICD-10-CM

## 2023-06-27 DIAGNOSIS — E11.9 TYPE 2 DIABETES MELLITUS WITHOUT COMPLICATIONS (MULTI): ICD-10-CM

## 2023-06-27 DIAGNOSIS — M19.90 UNSPECIFIED OSTEOARTHRITIS, UNSPECIFIED SITE: ICD-10-CM

## 2023-06-27 DIAGNOSIS — Z72.0 TOBACCO USE: ICD-10-CM

## 2023-06-27 DIAGNOSIS — Z88.2 ALLERGY STATUS TO SULFONAMIDES: ICD-10-CM

## 2023-06-27 DIAGNOSIS — J45.909 UNSPECIFIED ASTHMA, UNCOMPLICATED (HHS-HCC): ICD-10-CM

## 2023-06-27 DIAGNOSIS — N40.0 BENIGN PROSTATIC HYPERPLASIA WITHOUT LOWER URINARY TRACT SYMPTOMS: ICD-10-CM

## 2023-06-27 LAB
POCT GLUCOSE: 103 MG/DL (ref 74–99)
POCT GLUCOSE: 107 MG/DL (ref 74–99)

## 2023-07-05 ENCOUNTER — TELEMEDICINE (OUTPATIENT)
Dept: PHARMACY | Facility: HOSPITAL | Age: 68
End: 2023-07-05
Payer: MEDICARE

## 2023-07-05 DIAGNOSIS — E11.9 TYPE 2 DIABETES MELLITUS WITHOUT COMPLICATION, WITHOUT LONG-TERM CURRENT USE OF INSULIN (MULTI): Primary | ICD-10-CM

## 2023-07-05 DIAGNOSIS — E11.9 TYPE 2 DIABETES MELLITUS WITHOUT COMPLICATION, WITHOUT LONG-TERM CURRENT USE OF INSULIN (MULTI): ICD-10-CM

## 2023-07-05 RX ORDER — SEMAGLUTIDE 0.68 MG/ML
0.5 INJECTION, SOLUTION SUBCUTANEOUS
Qty: 3 ML | Refills: 2 | Status: SHIPPED | OUTPATIENT
Start: 2023-07-05 | End: 2023-08-02 | Stop reason: DRUGHIGH

## 2023-07-05 NOTE — PROGRESS NOTES
Pharmacist Clinic: Diabetes Management  Shawn Romero is a 68 y.o. male was referred to Clinical Pharmacy Team for diabetes management. Since last visit, patient started decreasing his insulin on his own. He is now taking 75-80 units per day.      Referring Provider: Christopher D'Amico,      LAB REVIEW   Glucose (mg/dL)   Date Value   06/16/2023 151 (H)   05/12/2023 131 (H)   10/07/2022 179 (H)     Hemoglobin A1C (%)   Date Value   05/12/2023 8.4 (A)   10/07/2022 7.7 (A)   06/21/2022 8.1 (A)     Bicarbonate (mmol/L)   Date Value   06/16/2023 36 (H)   05/12/2023 32   10/07/2022 31     Urea Nitrogen (mg/dL)   Date Value   06/16/2023 18   05/12/2023 18   10/07/2022 12     Creatinine (mg/dL)   Date Value   06/16/2023 0.92   05/12/2023 0.86   10/07/2022 0.89     Lab Results   Component Value Date    HGBA1C 8.4 (A) 05/12/2023    HGBA1C 7.7 (A) 10/07/2022    HGBA1C 8.1 (A) 06/21/2022     Lab Results   Component Value Date    CHOL 220 (H) 05/12/2023    CHOL 229 (H) 10/07/2022    CHOL 243 (H) 06/21/2022     Lab Results   Component Value Date    HDL 43.8 05/12/2023    HDL 48.9 10/07/2022    HDL 43.6 06/21/2022     Lab Results   Component Value Date    TRIG 187 (H) 05/12/2023    TRIG 195 (H) 10/07/2022    TRIG 202 (H) 06/21/2022     DIABETES ASSESSMENT    CURRENT PHARMACOTHERAPY  - metformin 1000 mg 1 by mouth twice daily   - lantus 100U/3ml 80 units nightly   - jardiance 10 mg daily   - humalog 12 units three times daily  - ozempic 0.5 mg under the skin once weekly (injects on wednesdays)    SECONDARY PREVENTION  - Statin? No; patient cannot tolerate statins, on PCSK9i  - ACE-I/ARB? No    SMBG MEASUREMENTS:   - last weeks AVG of 140  - week prior AVG of 170    DISCUSSION  - Patient is tolerating increased dose of ozempic without issues   - Patient is down 25 pounds, his sugar readings are getting better   - Patient likes his blood glucose to range around 100 in the morning, he does not like when it gets  lower    RECOMMENDATIONS/PLAN  1. Patients diabetes is poorly controlled with most recent A1c of 8.4 % (goal < 7 %).   - Continue all meds under the continuation of care with the referring provider and clinical pharmacy team.  - Continue ozempic to 0.5 mg under the skin once weekly.    Clinical Pharmacist follow up: 7/12 @ 11 AM    Thank you,  Janessa Caro, PharmD    Verbal consent to manage patient's drug therapy was obtained from the patient. They were informed they may decline to participate or withdraw from participation in pharmacy services at any time.

## 2023-07-12 ENCOUNTER — TELEMEDICINE (OUTPATIENT)
Dept: PHARMACY | Facility: HOSPITAL | Age: 68
End: 2023-07-12
Payer: MEDICARE

## 2023-07-12 DIAGNOSIS — E11.9 TYPE 2 DIABETES MELLITUS WITHOUT COMPLICATION, WITHOUT LONG-TERM CURRENT USE OF INSULIN (MULTI): Primary | ICD-10-CM

## 2023-07-12 NOTE — PROGRESS NOTES
Pharmacist Clinic: Diabetes Management  Shawn Romero is a 68 y.o. male was referred to Clinical Pharmacy Team for diabetes management. Since last visit, patient started decreasing his insulin on his own. He is now taking 60 units per day.      Referring Provider: Christopher D'Amico, DO     HISTORY OF PRESENT ILLNESS  Patient is an insulin dependent T2DM. Historically he was unable to afford meds due to cost. He is approved for the  PAP for ozempic and repatha.     LAB REVIEW   Glucose (mg/dL)   Date Value   06/16/2023 151 (H)   05/12/2023 131 (H)   10/07/2022 179 (H)     Hemoglobin A1C (%)   Date Value   05/12/2023 8.4 (A)   10/07/2022 7.7 (A)   06/21/2022 8.1 (A)     Bicarbonate (mmol/L)   Date Value   06/16/2023 36 (H)   05/12/2023 32   10/07/2022 31     Urea Nitrogen (mg/dL)   Date Value   06/16/2023 18   05/12/2023 18   10/07/2022 12     Creatinine (mg/dL)   Date Value   06/16/2023 0.92   05/12/2023 0.86   10/07/2022 0.89     Lab Results   Component Value Date    HGBA1C 8.4 (A) 05/12/2023    HGBA1C 7.7 (A) 10/07/2022    HGBA1C 8.1 (A) 06/21/2022     Lab Results   Component Value Date    CHOL 220 (H) 05/12/2023    CHOL 229 (H) 10/07/2022    CHOL 243 (H) 06/21/2022     Lab Results   Component Value Date    HDL 43.8 05/12/2023    HDL 48.9 10/07/2022    HDL 43.6 06/21/2022     Lab Results   Component Value Date    TRIG 187 (H) 05/12/2023    TRIG 195 (H) 10/07/2022    TRIG 202 (H) 06/21/2022     DIABETES ASSESSMENT    CURRENT PHARMACOTHERAPY  - metformin 1000 mg 1 by mouth twice daily   - lantus 100U/3ml 65 units nightly   - jardiance 10 mg daily   - humalog 10-14 units with meals  - ozempic 0.5 mg under the skin once weekly (injects on wednesdays)    SECONDARY PREVENTION  - Statin? No; patient cannot tolerate statins, on PCSK9i  - ACE-I/ARB? No    SMBG MEASUREMENTS:   - last weeks AVG of 131  - week prior AVG of 138    DISCUSSION  - Patient is tolerating increased dose of ozempic without issues  - Patient is down  to 292 pounds, he is eating less throughout the day (2 meals at most)   - Patient is decreasing his insulin on his own, we discussed no more than 2-4 units on a weekly basis, however he prefers to go in increments of 5 units    RECOMMENDATIONS/PLAN  1. Patients diabetes is poorly controlled with most recent A1c of 8.4 % (goal < 7 %).   - Continue all meds under the continuation of care with the referring provider and clinical pharmacy team.  - Continue ozempic to 0.5 mg under the skin once weekly.    Clinical Pharmacist follow up: 8/2 @ 11 AM, discuss ozempic dosage increase    Thank you,  Janessa Caro, PharmD    Verbal consent to manage patient's drug therapy was obtained from the patient. They were informed they may decline to participate or withdraw from participation in pharmacy services at any time.

## 2023-07-23 DIAGNOSIS — E11.9 TYPE 2 DIABETES MELLITUS WITHOUT COMPLICATION, WITHOUT LONG-TERM CURRENT USE OF INSULIN (MULTI): ICD-10-CM

## 2023-07-24 DIAGNOSIS — N40.0 BENIGN PROSTATIC HYPERPLASIA, UNSPECIFIED WHETHER LOWER URINARY TRACT SYMPTOMS PRESENT: ICD-10-CM

## 2023-07-24 DIAGNOSIS — T78.40XA ALLERGY, INITIAL ENCOUNTER: ICD-10-CM

## 2023-07-24 DIAGNOSIS — E11.9 TYPE 2 DIABETES MELLITUS WITHOUT COMPLICATION, WITHOUT LONG-TERM CURRENT USE OF INSULIN (MULTI): ICD-10-CM

## 2023-07-24 RX ORDER — TAMSULOSIN HYDROCHLORIDE 0.4 MG/1
0.8 CAPSULE ORAL DAILY
Qty: 180 CAPSULE | Refills: 1 | Status: SHIPPED | OUTPATIENT
Start: 2023-07-24 | End: 2023-08-29 | Stop reason: SDUPTHER

## 2023-07-24 RX ORDER — PREDNISONE 1 MG/1
4 TABLET ORAL DAILY
Qty: 120 TABLET | Refills: 0 | Status: CANCELLED | OUTPATIENT
Start: 2023-07-24

## 2023-07-24 RX ORDER — LORATADINE 10 MG/1
10 TABLET ORAL NIGHTLY
COMMUNITY
Start: 2022-11-12 | End: 2023-07-24 | Stop reason: SDUPTHER

## 2023-07-24 RX ORDER — LORATADINE 10 MG/1
10 TABLET ORAL NIGHTLY
Qty: 90 TABLET | Refills: 3 | Status: SHIPPED | OUTPATIENT
Start: 2023-07-24 | End: 2023-12-12 | Stop reason: SDUPTHER

## 2023-07-25 RX ORDER — PREDNISONE 1 MG/1
4 TABLET ORAL DAILY
Qty: 120 TABLET | Refills: 0 | OUTPATIENT
Start: 2023-07-25

## 2023-08-02 ENCOUNTER — TELEMEDICINE (OUTPATIENT)
Dept: PHARMACY | Facility: HOSPITAL | Age: 68
End: 2023-08-02
Payer: MEDICARE

## 2023-08-02 DIAGNOSIS — E11.9 TYPE 2 DIABETES MELLITUS WITHOUT COMPLICATION, WITHOUT LONG-TERM CURRENT USE OF INSULIN (MULTI): Primary | ICD-10-CM

## 2023-08-02 RX ORDER — SEMAGLUTIDE 1.34 MG/ML
1 INJECTION, SOLUTION SUBCUTANEOUS
Qty: 3 ML | Refills: 2 | Status: SHIPPED | OUTPATIENT
Start: 2023-08-02 | End: 2023-08-16 | Stop reason: DRUGHIGH

## 2023-08-02 NOTE — PROGRESS NOTES
Pharmacist Clinic: Diabetes Management  Shawn Romero is a 68 y.o. male was referred to Clinical Pharmacy Team for diabetes management. Since last visit, patient started decreasing his insulin on his own. He is now taking 60 units per day.      Referring Provider: Christopher D'Amico, DO     HISTORY OF PRESENT ILLNESS  Patient is an insulin dependent T2DM. Historically he was unable to afford meds due to cost. He is approved for the  PAP for ozempic and repatha.     LAB REVIEW   Glucose (mg/dL)   Date Value   06/16/2023 151 (H)   05/12/2023 131 (H)   10/07/2022 179 (H)     Hemoglobin A1C (%)   Date Value   05/12/2023 8.4 (A)   10/07/2022 7.7 (A)   06/21/2022 8.1 (A)     Bicarbonate (mmol/L)   Date Value   06/16/2023 36 (H)   05/12/2023 32   10/07/2022 31     Urea Nitrogen (mg/dL)   Date Value   06/16/2023 18   05/12/2023 18   10/07/2022 12     Creatinine (mg/dL)   Date Value   06/16/2023 0.92   05/12/2023 0.86   10/07/2022 0.89     Lab Results   Component Value Date    HGBA1C 8.4 (A) 05/12/2023    HGBA1C 7.7 (A) 10/07/2022    HGBA1C 8.1 (A) 06/21/2022     Lab Results   Component Value Date    CHOL 220 (H) 05/12/2023    CHOL 229 (H) 10/07/2022    CHOL 243 (H) 06/21/2022     Lab Results   Component Value Date    HDL 43.8 05/12/2023    HDL 48.9 10/07/2022    HDL 43.6 06/21/2022     Lab Results   Component Value Date    TRIG 187 (H) 05/12/2023    TRIG 195 (H) 10/07/2022    TRIG 202 (H) 06/21/2022     DIABETES ASSESSMENT    CURRENT PHARMACOTHERAPY  - metformin 1000 mg 1 by mouth twice daily   - lantus 100U/3ml 75 units nightly   - jardiance 10 mg daily   - humalog 10-14 units with meals  - ozempic 0.5 mg under the skin once weekly (injects on wednesdays)    SECONDARY PREVENTION  - Statin? No; patient cannot tolerate statins, on PCSK9i  - ACE-I/ARB? No    SMBG MEASUREMENTS:   - last weeks AVG of 160  - last 2 weeks: 165  - last 28 days: 150    DISCUSSION  - Patient is tolerating increased dose of ozempic without issues,  discussed increasing the ozempic to 1 mg under the skin once weekly  - keep checking your blood glucose  - Patient is down to 293 pounds, he is eating less throughout the day (2 meals at most)     RECOMMENDATIONS/PLAN  1. Patients diabetes is poorly controlled with most recent A1c of 8.4 % (goal < 7 %).   - Continue all meds under the continuation of care with the referring provider and clinical pharmacy team.  - Increase ozempic to 1 mg under the skin once weekly.     Clinical Pharmacist follow up: 8/16 @ 11 AM    Thank you,  Janessa Caro, PharmD    Verbal consent to manage patient's drug therapy was obtained from the patient. They were informed they may decline to participate or withdraw from participation in pharmacy services at any time.

## 2023-08-03 ENCOUNTER — TELEMEDICINE (OUTPATIENT)
Dept: PHARMACY | Facility: HOSPITAL | Age: 68
End: 2023-08-03
Payer: MEDICARE

## 2023-08-03 DIAGNOSIS — E11.9 TYPE 2 DIABETES MELLITUS WITHOUT COMPLICATION, WITHOUT LONG-TERM CURRENT USE OF INSULIN (MULTI): Primary | ICD-10-CM

## 2023-08-03 RX ORDER — SEMAGLUTIDE 0.68 MG/ML
0.5 INJECTION, SOLUTION SUBCUTANEOUS
Qty: 3 ML | Refills: 0 | Status: SHIPPED | OUTPATIENT
Start: 2023-08-03 | End: 2023-08-16 | Stop reason: DRUGHIGH

## 2023-08-03 NOTE — PROGRESS NOTES
Pharmacist Clinic: Diabetes Management  Shawn Romero is a 68 y.o. male was referred to Clinical Pharmacy Team for diabetes management.   Referring Provider: Christopher D'Amico, DO   ** SINCE LAST SPEAKING OZEMPIC 1 MG WENT ON BACKORDER **     HISTORY OF PRESENT ILLNESS  Patient is an insulin dependent T2DM. Historically he was unable to afford meds due to cost. He is approved for the  PAP for ozempic and repatha.     LAB REVIEW   Glucose (mg/dL)   Date Value   06/16/2023 151 (H)   05/12/2023 131 (H)   10/07/2022 179 (H)     Hemoglobin A1C (%)   Date Value   05/12/2023 8.4 (A)   10/07/2022 7.7 (A)   06/21/2022 8.1 (A)     Bicarbonate (mmol/L)   Date Value   06/16/2023 36 (H)   05/12/2023 32   10/07/2022 31     Urea Nitrogen (mg/dL)   Date Value   06/16/2023 18   05/12/2023 18   10/07/2022 12     Creatinine (mg/dL)   Date Value   06/16/2023 0.92   05/12/2023 0.86   10/07/2022 0.89     Lab Results   Component Value Date    HGBA1C 8.4 (A) 05/12/2023    HGBA1C 7.7 (A) 10/07/2022    HGBA1C 8.1 (A) 06/21/2022     Lab Results   Component Value Date    CHOL 220 (H) 05/12/2023    CHOL 229 (H) 10/07/2022    CHOL 243 (H) 06/21/2022     Lab Results   Component Value Date    HDL 43.8 05/12/2023    HDL 48.9 10/07/2022    HDL 43.6 06/21/2022     Lab Results   Component Value Date    TRIG 187 (H) 05/12/2023    TRIG 195 (H) 10/07/2022    TRIG 202 (H) 06/21/2022     DIABETES ASSESSMENT    CURRENT PHARMACOTHERAPY  - metformin 1000 mg 1 by mouth twice daily   - lantus 100U/3ml 75 units nightly   - jardiance 10 mg daily   - humalog 10-14 units with meals  - ozempic 0.5 mg under the skin once weekly (injects on wednesdays)    SECONDARY PREVENTION  - Statin? No; patient cannot tolerate statins, on PCSK9i  - ACE-I/ARB? No    DISCUSSION  - We will continue ozempic 0.5 mg until 1 mg is available  - Discussed with makers of the medication, the backorder should not last long  - Patient is going to get his PSA checked, he wants to get his A1c  looked at and his lipid panel    RECOMMENDATIONS/PLAN  1. Patients diabetes is poorly controlled with most recent A1c of 8.4 % (goal < 7 %).   - Continue all meds under the continuation of care with the referring provider and clinical pharmacy team.    Clinical Pharmacist follow up: 8/16 @ 11 AM    Thank you,  Janessa Caro, PharmD    Verbal consent to manage patient's drug therapy was obtained from the patient. They were informed they may decline to participate or withdraw from participation in pharmacy services at any time.

## 2023-08-07 DIAGNOSIS — M54.50 ACUTE LOW BACK PAIN, UNSPECIFIED BACK PAIN LATERALITY, UNSPECIFIED WHETHER SCIATICA PRESENT: ICD-10-CM

## 2023-08-07 DIAGNOSIS — M54.40 LOW BACK PAIN WITH SCIATICA, SCIATICA LATERALITY UNSPECIFIED, UNSPECIFIED BACK PAIN LATERALITY, UNSPECIFIED CHRONICITY: ICD-10-CM

## 2023-08-07 RX ORDER — TRAMADOL HYDROCHLORIDE 50 MG/1
50 TABLET ORAL 3 TIMES DAILY
Qty: 90 TABLET | Refills: 0 | Status: SHIPPED | OUTPATIENT
Start: 2023-08-07 | End: 2023-09-12 | Stop reason: SDUPTHER

## 2023-08-07 RX ORDER — ORPHENADRINE CITRATE 100 MG/1
100 TABLET, EXTENDED RELEASE ORAL DAILY
Qty: 30 TABLET | Refills: 1 | Status: SHIPPED | OUTPATIENT
Start: 2023-08-07 | End: 2023-09-12 | Stop reason: SDUPTHER

## 2023-08-16 ENCOUNTER — APPOINTMENT (OUTPATIENT)
Dept: PHARMACY | Facility: HOSPITAL | Age: 68
End: 2023-08-16
Payer: MEDICARE

## 2023-08-16 ENCOUNTER — TELEMEDICINE (OUTPATIENT)
Dept: PHARMACY | Facility: HOSPITAL | Age: 68
End: 2023-08-16
Payer: MEDICARE

## 2023-08-16 DIAGNOSIS — E11.9 TYPE 2 DIABETES MELLITUS WITHOUT COMPLICATION, WITHOUT LONG-TERM CURRENT USE OF INSULIN (MULTI): ICD-10-CM

## 2023-08-16 RX ORDER — INSULIN GLARGINE 100 [IU]/ML
70 INJECTION, SOLUTION SUBCUTANEOUS NIGHTLY
Qty: 60 ML | Refills: 1 | Status: SHIPPED | OUTPATIENT
Start: 2023-08-16 | End: 2023-08-16

## 2023-08-16 RX ORDER — INSULIN LISPRO 100 [IU]/ML
12-14 INJECTION, SOLUTION SUBCUTANEOUS
Qty: 30 ML | Refills: 1 | Status: SHIPPED | OUTPATIENT
Start: 2023-08-16 | End: 2024-02-08 | Stop reason: SDUPTHER

## 2023-08-16 RX ORDER — PEN NEEDLE, DIABETIC 30 GX3/16"
NEEDLE, DISPOSABLE MISCELLANEOUS
Qty: 400 EACH | Refills: 3 | Status: SHIPPED | OUTPATIENT
Start: 2023-08-16 | End: 2023-08-16

## 2023-08-16 NOTE — PROGRESS NOTES
Pharmacist Clinic: Diabetes Management  Shawn Romero is a 68 y.o. male was referred to Clinical Pharmacy Team for diabetes management. At last visit, ozempic was increased to 1 mg once weekly. Patient has not yet increased his ozempic, he will do this today.     Referring Provider: Christopher D'Amico, DO     HISTORY OF PRESENT ILLNESS  Patient is an insulin dependent T2DM. Historically he was unable to afford meds due to cost. He is approved for the  PAP for ozempic and repatha.     LAB REVIEW   Glucose (mg/dL)   Date Value   06/16/2023 151 (H)   05/12/2023 131 (H)   10/07/2022 179 (H)     Hemoglobin A1C (%)   Date Value   05/12/2023 8.4 (A)   10/07/2022 7.7 (A)   06/21/2022 8.1 (A)     Bicarbonate (mmol/L)   Date Value   06/16/2023 36 (H)   05/12/2023 32   10/07/2022 31     Urea Nitrogen (mg/dL)   Date Value   06/16/2023 18   05/12/2023 18   10/07/2022 12     Creatinine (mg/dL)   Date Value   06/16/2023 0.92   05/12/2023 0.86   10/07/2022 0.89     Lab Results   Component Value Date    HGBA1C 8.4 (A) 05/12/2023    HGBA1C 7.7 (A) 10/07/2022    HGBA1C 8.1 (A) 06/21/2022     Lab Results   Component Value Date    CHOL 220 (H) 05/12/2023    CHOL 229 (H) 10/07/2022    CHOL 243 (H) 06/21/2022     Lab Results   Component Value Date    HDL 43.8 05/12/2023    HDL 48.9 10/07/2022    HDL 43.6 06/21/2022     Lab Results   Component Value Date    TRIG 187 (H) 05/12/2023    TRIG 195 (H) 10/07/2022    TRIG 202 (H) 06/21/2022     DIABETES ASSESSMENT    CURRENT PHARMACOTHERAPY  - metformin 1000 mg 1 by mouth twice daily   - lantus 100U/3ml 75 units nightly   - jardiance 10 mg daily   - humalog 10-14 units with meals 2-3 times per day  - ozempic 0.5 mg under the skin once weekly (injects on wednesdays)    SECONDARY PREVENTION  - Statin? No; patient cannot tolerate statins, on PCSK9i  - ACE-I/ARB? No    DISCUSSION  - Patients sugars still look good, he is continuing to try and pull back on his long acting insulin, he has not been  successful with anything less than 70 units   - Patients due for insulin refills, his copay is high and he wants to inquire getting them through , we will have them mailed out and delivered to his house, similar to his other medications   - Good job testing your sugars, as we go up with your dose of ozempic, keep watching your numbers, we will talk in 2 weeks to discuss changes with your insulin    RECOMMENDATIONS/PLAN  1. Patients diabetes is poorly controlled with most recent A1c of 8.4 % (goal < 7 %).   - Continue all meds under the continuation of care with the referring provider and clinical pharmacy team.  - Increase ozempic to 1 mg under the skin once weekly   - Continue lantus 70 units once daily and humalog 14 units with meals. Adjust as appropriate to keep your sugar readings at goal.     2. Future considerations: increase jardiance to 25 mg    Clinical Pharmacist follow up: 8/30 @ 11 AM    Thank you,  Janessa Caro, PharmD    Verbal consent to manage patient's drug therapy was obtained from the patient. They were informed they may decline to participate or withdraw from participation in pharmacy services at any time.

## 2023-08-29 DIAGNOSIS — N40.0 BENIGN PROSTATIC HYPERPLASIA, UNSPECIFIED WHETHER LOWER URINARY TRACT SYMPTOMS PRESENT: ICD-10-CM

## 2023-08-29 RX ORDER — TAMSULOSIN HYDROCHLORIDE 0.4 MG/1
0.8 CAPSULE ORAL DAILY
Qty: 180 CAPSULE | Refills: 1 | Status: SHIPPED | OUTPATIENT
Start: 2023-08-29 | End: 2023-12-12 | Stop reason: SDUPTHER

## 2023-08-30 ENCOUNTER — TELEMEDICINE (OUTPATIENT)
Dept: PHARMACY | Facility: HOSPITAL | Age: 68
End: 2023-08-30
Payer: MEDICARE

## 2023-08-30 DIAGNOSIS — E11.9 TYPE 2 DIABETES MELLITUS WITHOUT COMPLICATION, WITHOUT LONG-TERM CURRENT USE OF INSULIN (MULTI): Primary | ICD-10-CM

## 2023-08-30 NOTE — PROGRESS NOTES
Pharmacist Clinic: Diabetes Management  Shawn Romero is a 68 y.o. male was referred to Clinical Pharmacy Team for diabetes management. At last visit, ozempic was increased to 1 mg once weekly.     Referring Provider: Christopher D'Amico, DO     HISTORY OF PRESENT ILLNESS  Patient is an insulin dependent T2DM. Historically he was unable to afford meds due to cost. He is approved for the  PAP for ozempic and repatha.     LAB REVIEW   Glucose (mg/dL)   Date Value   06/16/2023 151 (H)   05/12/2023 131 (H)   10/07/2022 179 (H)     Hemoglobin A1C (%)   Date Value   05/12/2023 8.4 (A)   10/07/2022 7.7 (A)   06/21/2022 8.1 (A)     Bicarbonate (mmol/L)   Date Value   06/16/2023 36 (H)   05/12/2023 32   10/07/2022 31     Urea Nitrogen (mg/dL)   Date Value   06/16/2023 18   05/12/2023 18   10/07/2022 12     Creatinine (mg/dL)   Date Value   06/16/2023 0.92   05/12/2023 0.86   10/07/2022 0.89     Lab Results   Component Value Date    HGBA1C 8.4 (A) 05/12/2023    HGBA1C 7.7 (A) 10/07/2022    HGBA1C 8.1 (A) 06/21/2022     Lab Results   Component Value Date    CHOL 220 (H) 05/12/2023    CHOL 229 (H) 10/07/2022    CHOL 243 (H) 06/21/2022     Lab Results   Component Value Date    HDL 43.8 05/12/2023    HDL 48.9 10/07/2022    HDL 43.6 06/21/2022     Lab Results   Component Value Date    TRIG 187 (H) 05/12/2023    TRIG 195 (H) 10/07/2022    TRIG 202 (H) 06/21/2022     DIABETES ASSESSMENT    CURRENT PHARMACOTHERAPY  - metformin 1000 mg 1 by mouth twice daily   - lantus 100U/3ml 70 units nightly (currently doing 60 units)  - jardiance 10 mg daily    - humalog 10-14 units with meals 2-3 times per day  - ozempic 1 mg under the skin once weekly (injects on wednesdays)    SECONDARY PREVENTION  - Statin? No; patient cannot tolerate statins, on PCSK9i  - ACE-I/ARB? No    SMBG: pt uses freestyle bijan to check his blood glucose  - Last weeks average: 132  - This weeks average: 135    DISCUSSION  - Patient is tolerating the higher dose of  ozempic, he does not complain of any side effects   - He has cut his insulin back to 60-65 units per day since using the higher dose of ozempic  - Patient has been eating healthier, he does not have as many cravings for chips and sweets    RECOMMENDATIONS/PLAN  1. Patients diabetes is poorly controlled with most recent A1c of 8.4 % (goal < 7 %).   - Continue all meds under the continuation of care with the referring provider and clinical pharmacy team.    2. Future considerations: increase jardiance to 25 mg    Clinical Pharmacist follow up: 2 weeks    Thank you,  Janessa Caro, PharmD    Verbal consent to manage patient's drug therapy was obtained from the patient. They were informed they may decline to participate or withdraw from participation in pharmacy services at any time.

## 2023-09-08 ENCOUNTER — LAB (OUTPATIENT)
Dept: LAB | Facility: LAB | Age: 68
End: 2023-09-08
Payer: MEDICARE

## 2023-09-08 DIAGNOSIS — E11.9 TYPE 2 DIABETES MELLITUS WITHOUT COMPLICATION, WITHOUT LONG-TERM CURRENT USE OF INSULIN (MULTI): ICD-10-CM

## 2023-09-08 LAB
CHOLESTEROL (MG/DL) IN SER/PLAS: 119 MG/DL (ref 0–199)
CHOLESTEROL IN HDL (MG/DL) IN SER/PLAS: 49.3 MG/DL
CHOLESTEROL/HDL RATIO: 2.4
ESTIMATED AVERAGE GLUCOSE FOR HBA1C: 154 MG/DL
HEMOGLOBIN A1C/HEMOGLOBIN TOTAL IN BLOOD: 7 %
LDL: 32 MG/DL (ref 0–99)
TRIGLYCERIDE (MG/DL) IN SER/PLAS: 189 MG/DL (ref 0–149)
VLDL: 38 MG/DL (ref 0–40)

## 2023-09-08 PROCEDURE — 36415 COLL VENOUS BLD VENIPUNCTURE: CPT

## 2023-09-08 PROCEDURE — 83036 HEMOGLOBIN GLYCOSYLATED A1C: CPT

## 2023-09-08 PROCEDURE — 80061 LIPID PANEL: CPT

## 2023-09-08 NOTE — LETTER
September 13, 2023     Shawn Romero  [id number] [place] [id number]      Dear Mr. Romero:    Below are the results from your recent visit:    Hemoglobin A1c at 7.0, Continue to work with [facility] pharmacist, doing well.     Cholesterol looks great, slightly elevated triglycerides at 189, likely secondary to blood sugar.         If you have any questions or concerns, please don't hesitate to call.

## 2023-09-11 ENCOUNTER — TELEPHONE (OUTPATIENT)
Dept: PRIMARY CARE | Facility: CLINIC | Age: 68
End: 2023-09-11
Payer: MEDICARE

## 2023-09-11 NOTE — RESULT ENCOUNTER NOTE
Hemoglobin A1c at 7.0, first time he has been at goal in the last few years.  Continue to work with [facility] pharmacist, doing well.    Cholesterol looks great, slightly elevated triglycerides at 189, likely secondary to blood sugar.

## 2023-09-12 ENCOUNTER — OFFICE VISIT (OUTPATIENT)
Dept: PRIMARY CARE | Facility: CLINIC | Age: 68
End: 2023-09-12
Payer: MEDICARE

## 2023-09-12 VITALS
BODY MASS INDEX: 39.49 KG/M2 | DIASTOLIC BLOOD PRESSURE: 81 MMHG | WEIGHT: 298 LBS | HEART RATE: 92 BPM | SYSTOLIC BLOOD PRESSURE: 127 MMHG | HEIGHT: 73 IN | OXYGEN SATURATION: 93 %

## 2023-09-12 DIAGNOSIS — M54.50 ACUTE LOW BACK PAIN, UNSPECIFIED BACK PAIN LATERALITY, UNSPECIFIED WHETHER SCIATICA PRESENT: ICD-10-CM

## 2023-09-12 DIAGNOSIS — G47.19 EXCESSIVE DAYTIME SLEEPINESS: ICD-10-CM

## 2023-09-12 DIAGNOSIS — Z79.4 TYPE 2 DIABETES MELLITUS WITHOUT COMPLICATION, WITH LONG-TERM CURRENT USE OF INSULIN (MULTI): Primary | ICD-10-CM

## 2023-09-12 DIAGNOSIS — I10 HYPERTENSION, UNSPECIFIED TYPE: ICD-10-CM

## 2023-09-12 DIAGNOSIS — N52.9 ERECTILE DYSFUNCTION, UNSPECIFIED ERECTILE DYSFUNCTION TYPE: ICD-10-CM

## 2023-09-12 DIAGNOSIS — G89.29 CHRONIC LOW BACK PAIN, UNSPECIFIED BACK PAIN LATERALITY, UNSPECIFIED WHETHER SCIATICA PRESENT: ICD-10-CM

## 2023-09-12 DIAGNOSIS — M54.50 CHRONIC LOW BACK PAIN, UNSPECIFIED BACK PAIN LATERALITY, UNSPECIFIED WHETHER SCIATICA PRESENT: ICD-10-CM

## 2023-09-12 DIAGNOSIS — G70.00 OCULAR MYASTHENIA GRAVIS (MULTI): ICD-10-CM

## 2023-09-12 DIAGNOSIS — K21.9 GASTROESOPHAGEAL REFLUX DISEASE, UNSPECIFIED WHETHER ESOPHAGITIS PRESENT: ICD-10-CM

## 2023-09-12 DIAGNOSIS — N40.0 BENIGN PROSTATIC HYPERPLASIA, UNSPECIFIED WHETHER LOWER URINARY TRACT SYMPTOMS PRESENT: ICD-10-CM

## 2023-09-12 DIAGNOSIS — E11.9 TYPE 2 DIABETES MELLITUS WITHOUT COMPLICATION, WITH LONG-TERM CURRENT USE OF INSULIN (MULTI): Primary | ICD-10-CM

## 2023-09-12 DIAGNOSIS — E78.5 HYPERLIPIDEMIA, UNSPECIFIED HYPERLIPIDEMIA TYPE: ICD-10-CM

## 2023-09-12 DIAGNOSIS — R06.83 SNORING: ICD-10-CM

## 2023-09-12 PROCEDURE — 1159F MED LIST DOCD IN RCRD: CPT | Performed by: STUDENT IN AN ORGANIZED HEALTH CARE EDUCATION/TRAINING PROGRAM

## 2023-09-12 PROCEDURE — 3074F SYST BP LT 130 MM HG: CPT | Performed by: STUDENT IN AN ORGANIZED HEALTH CARE EDUCATION/TRAINING PROGRAM

## 2023-09-12 PROCEDURE — 1125F AMNT PAIN NOTED PAIN PRSNT: CPT | Performed by: STUDENT IN AN ORGANIZED HEALTH CARE EDUCATION/TRAINING PROGRAM

## 2023-09-12 PROCEDURE — 99214 OFFICE O/P EST MOD 30 MIN: CPT | Performed by: STUDENT IN AN ORGANIZED HEALTH CARE EDUCATION/TRAINING PROGRAM

## 2023-09-12 PROCEDURE — 3079F DIAST BP 80-89 MM HG: CPT | Performed by: STUDENT IN AN ORGANIZED HEALTH CARE EDUCATION/TRAINING PROGRAM

## 2023-09-12 PROCEDURE — 3051F HG A1C>EQUAL 7.0%<8.0%: CPT | Performed by: STUDENT IN AN ORGANIZED HEALTH CARE EDUCATION/TRAINING PROGRAM

## 2023-09-12 PROCEDURE — 1160F RVW MEDS BY RX/DR IN RCRD: CPT | Performed by: STUDENT IN AN ORGANIZED HEALTH CARE EDUCATION/TRAINING PROGRAM

## 2023-09-12 RX ORDER — CHLORHEXIDINE GLUCONATE ORAL RINSE 1.2 MG/ML
SOLUTION DENTAL
COMMUNITY
Start: 2023-06-16 | End: 2023-12-20 | Stop reason: ALTCHOICE

## 2023-09-12 RX ORDER — ORPHENADRINE CITRATE 100 MG/1
100 TABLET, EXTENDED RELEASE ORAL DAILY
Qty: 30 TABLET | Refills: 1 | Status: SHIPPED | OUTPATIENT
Start: 2023-09-12 | End: 2023-10-31 | Stop reason: SDUPTHER

## 2023-09-12 RX ORDER — PHENAZOPYRIDINE HYDROCHLORIDE 200 MG/1
200 TABLET, FILM COATED ORAL 3 TIMES DAILY
COMMUNITY
Start: 2023-06-27 | End: 2023-12-20 | Stop reason: ALTCHOICE

## 2023-09-12 RX ORDER — TRAMADOL HYDROCHLORIDE 50 MG/1
50 TABLET ORAL 3 TIMES DAILY
Qty: 90 TABLET | Refills: 0 | Status: SHIPPED | OUTPATIENT
Start: 2023-09-12 | End: 2023-11-29

## 2023-09-12 RX ORDER — BLOOD SUGAR DIAGNOSTIC
STRIP MISCELLANEOUS
COMMUNITY
Start: 2018-09-13 | End: 2024-05-13 | Stop reason: SDUPTHER

## 2023-09-12 RX ORDER — TRIAMCINOLONE ACETONIDE 1 MG/G
OINTMENT TOPICAL
COMMUNITY
Start: 2020-11-25 | End: 2023-12-20 | Stop reason: ALTCHOICE

## 2023-09-12 RX ORDER — MELOXICAM 15 MG/1
15 TABLET ORAL DAILY
COMMUNITY
End: 2024-03-26 | Stop reason: SDUPTHER

## 2023-09-12 RX ORDER — TADALAFIL 20 MG/1
TABLET ORAL
COMMUNITY
Start: 2020-02-11 | End: 2023-12-20 | Stop reason: ALTCHOICE

## 2023-09-12 RX ORDER — PANTOPRAZOLE SODIUM 40 MG/1
40 TABLET, DELAYED RELEASE ORAL DAILY
COMMUNITY
Start: 2023-09-04 | End: 2023-10-31 | Stop reason: SDUPTHER

## 2023-09-12 NOTE — PROGRESS NOTES
68-year-old male presenting for 3-month follow-up on chronic conditions:    DMII  Decreased basal insulin down to 60 units, still taking mealtime lispro 12 units.  Tolerating increased GLP-1 and SGLT2 regimen, following with [facility] pharmacist.  Using freestyle CGM, last A1c at 7.0    HTN  Currently not taking any medications, asymptomatic    HLD  Taking Repatha, tolerating well.    Chronic low back pain, chronic knee pain    ED  Stable    GERD  Stable    Ocular MG  Stable, following with neuro.    Snoring, excessive daytime sleepiness  Chronic, has been advised to get sleep apnea test in the past, did not go through with that.    12 point ROS reviewed and negative other than as stated in HPI    General: Alert, oriented, pleasant, in no mild distress  HEENT:      Head: normocephalic, atraumatic;      eyes: EOMI, no scleral icterus;   CV: Heart with regular rate and rhythm, normal S1/S2, no murmurs  Lungs: CTAB without wheezing, rhonchi or rales; good respiratory effort, no increased work of breathing  Neuro: Cranial nerves grossly intact; alert and oriented  Psych: Appropriate mood and affect      1.  DMII  -Last A1C: 7.0 date: 09/2023  -Last Albumin/Creat ration: WNL Date: 10/21/21  -Diabetic foot exam: Date:  -Diabetic eye exam: two years ago, ophthalmology referral today  -Current Medications: Lantus 60 units at bedtime, Humalog 12 units with meals, metformin 1000 mg twice daily, Jardiance 10 mg daily, Ozempic 1 mg weekly  -Statin: None, reports allergy to all statins  -ASA: None  -ACE/ARB: None  -Nutritionist: Referral pending  -Medication changes: We will continue to follow with [facility] pharmacist and titrate up Ozempic, would like to decrease insulin regimen, and see if we can keep blood sugar under goal    2.  HTN  -At goal in office without medication, will continue to monitor    3. HLD  -Continue Repatha, doing very well     4. BPH  -Continue tamsulosin 0.4 mg 2 tablets daily, following with urology     5.  Chronic  low back pain 6. Chronic L knee pain  -CSA and UDS completed   -Continue tramadol 50 mg 3 times daily as needed  -Continue Norflex as needed     7. ED  -Continue tadalafil 20 mg as needed     8. GERD  -Continue pantoprazole 40 mg daily     9. Ocular MG  -Continue to follow with neurology  -Current medications include prednisone 4 mg daily, pyridostigmine 60 mg 3 times daily    10.  Excessive daytime sleepiness 11.  Snoring  - Home sleep apnea test    Follow-up 3 months     Christopher D'Amico, DO

## 2023-09-13 ENCOUNTER — TELEMEDICINE (OUTPATIENT)
Dept: PHARMACY | Facility: HOSPITAL | Age: 68
End: 2023-09-13
Payer: MEDICARE

## 2023-09-13 DIAGNOSIS — E11.9 TYPE 2 DIABETES MELLITUS WITHOUT COMPLICATION, WITHOUT LONG-TERM CURRENT USE OF INSULIN (MULTI): Primary | ICD-10-CM

## 2023-09-13 NOTE — PROGRESS NOTES
Pharmacist Clinic: Diabetes Management  Shawn Romero is a 68 y.o. male was referred to Clinical Pharmacy Team for diabetes management. Since last visit, patient got his lab work done.     Referring Provider: Christopher D'Amico, DO     HISTORY OF PRESENT ILLNESS  Patient is an insulin dependent T2DM. Historically he was unable to afford meds due to cost. He is approved for the  PAP for ozempic and repatha.     LAB REVIEW   Glucose (mg/dL)   Date Value   06/16/2023 151 (H)   05/12/2023 131 (H)   10/07/2022 179 (H)     Hemoglobin A1C (%)   Date Value   09/08/2023 7.0 (A)   05/12/2023 8.4 (A)   10/07/2022 7.7 (A)     Bicarbonate (mmol/L)   Date Value   06/16/2023 36 (H)   05/12/2023 32   10/07/2022 31     Urea Nitrogen (mg/dL)   Date Value   06/16/2023 18   05/12/2023 18   10/07/2022 12     Creatinine (mg/dL)   Date Value   06/16/2023 0.92   05/12/2023 0.86   10/07/2022 0.89     Lab Results   Component Value Date    HGBA1C 7.0 (A) 09/08/2023    HGBA1C 8.4 (A) 05/12/2023    HGBA1C 7.7 (A) 10/07/2022     Lab Results   Component Value Date    CHOL 119 09/08/2023    CHOL 220 (H) 05/12/2023    CHOL 229 (H) 10/07/2022     Lab Results   Component Value Date    HDL 49.3 09/08/2023    HDL 43.8 05/12/2023    HDL 48.9 10/07/2022     Lab Results   Component Value Date    TRIG 189 (H) 09/08/2023    TRIG 187 (H) 05/12/2023    TRIG 195 (H) 10/07/2022     DIABETES ASSESSMENT    CURRENT PHARMACOTHERAPY  - metformin 1000 mg 1 by mouth twice daily   - lantus 100U/3ml 65 units nightly  - jardiance 10 mg daily    - humalog 10-14 units with meals 2-3 times per day  - ozempic 1 mg under the skin once weekly (injects on wednesdays)    SECONDARY PREVENTION  - Statin? No; patient cannot tolerate statins, on PCSK9i  - ACE-I/ARB? No    SMBG: pt uses freestyle bijan to check his blood glucose    DISCUSSION  - Congrats! Your A1c is at goal of 7%   - Continue all of your current medications, keep your insulin dose consistent at 65 units   -  Continue your repatha, your cholesterol looks great     RECOMMENDATIONS/PLAN  1. Patients diabetes is poorly controlled with most recent A1c of 8.4 % (goal < 7 %).   - Continue all meds under the continuation of care with the referring provider and clinical pharmacy team.    2. Future considerations: increase jardiance to 25 mg    Clinical Pharmacist follow up: 1 month    Thank you,  Janessa Caro, PharmD    Verbal consent to manage patient's drug therapy was obtained from the patient. They were informed they may decline to participate or withdraw from participation in pharmacy services at any time.

## 2023-09-28 PROBLEM — H26.9 CATARACTS, BOTH EYES: Status: RESOLVED | Noted: 2023-05-17 | Resolved: 2023-09-28

## 2023-09-28 PROBLEM — N40.1 BPH WITH OBSTRUCTION/LOWER URINARY TRACT SYMPTOMS: Status: RESOLVED | Noted: 2023-05-17 | Resolved: 2023-09-28

## 2023-09-28 PROBLEM — N13.8 BPH WITH OBSTRUCTION/LOWER URINARY TRACT SYMPTOMS: Status: RESOLVED | Noted: 2023-05-17 | Resolved: 2023-09-28

## 2023-09-28 PROBLEM — R19.7 ACUTE DIARRHEA: Status: RESOLVED | Noted: 2023-05-17 | Resolved: 2023-09-28

## 2023-09-28 PROBLEM — E11.9 DIABETES MELLITUS (MULTI): Status: RESOLVED | Noted: 2023-05-17 | Resolved: 2023-09-28

## 2023-09-28 PROBLEM — M79.2 NEUROPATHIC PAIN OF LEFT HAND: Status: RESOLVED | Noted: 2023-05-17 | Resolved: 2023-09-28

## 2023-09-28 PROBLEM — R35.0 INCREASED FREQUENCY OF URINATION: Status: RESOLVED | Noted: 2023-05-17 | Resolved: 2023-09-28

## 2023-09-28 PROBLEM — M62.830 BACK SPASM: Status: RESOLVED | Noted: 2023-05-17 | Resolved: 2023-09-28

## 2023-09-28 PROBLEM — M25.569 JOINT PAIN, KNEE: Status: RESOLVED | Noted: 2023-05-17 | Resolved: 2023-09-28

## 2023-09-28 PROBLEM — G70.00: Status: RESOLVED | Noted: 2023-05-17 | Resolved: 2023-09-28

## 2023-09-28 PROBLEM — R10.10 PAIN OF UPPER ABDOMEN: Status: RESOLVED | Noted: 2023-05-17 | Resolved: 2023-09-28

## 2023-09-28 PROBLEM — E11.9 DIABETES (MULTI): Status: RESOLVED | Noted: 2023-05-17 | Resolved: 2023-09-28

## 2023-09-28 PROBLEM — M25.519 SHOULDER PAIN: Status: RESOLVED | Noted: 2023-05-17 | Resolved: 2023-09-28

## 2023-09-28 PROBLEM — R10.30 GROIN PAIN: Status: RESOLVED | Noted: 2023-05-17 | Resolved: 2023-09-28

## 2023-09-28 PROBLEM — S80.00XA CONTUSION OF KNEE: Status: RESOLVED | Noted: 2023-05-17 | Resolved: 2023-09-28

## 2023-09-28 PROBLEM — J01.90 ACUTE SINUSITIS: Status: RESOLVED | Noted: 2023-05-17 | Resolved: 2023-09-28

## 2023-09-28 PROBLEM — R52 GENERALIZED BODY ACHES: Status: RESOLVED | Noted: 2023-05-17 | Resolved: 2023-09-28

## 2023-09-28 PROBLEM — G70.00 OCULAR MYASTHENIA GRAVIS (MULTI): Status: RESOLVED | Noted: 2023-05-17 | Resolved: 2023-09-28

## 2023-09-28 PROBLEM — M25.529 ELBOW PAIN: Status: RESOLVED | Noted: 2023-05-17 | Resolved: 2023-09-28

## 2023-09-28 PROBLEM — M25.561 RIGHT KNEE PAIN: Status: RESOLVED | Noted: 2023-05-17 | Resolved: 2023-09-28

## 2023-09-28 PROBLEM — M25.532 LEFT WRIST PAIN: Status: RESOLVED | Noted: 2023-05-17 | Resolved: 2023-09-28

## 2023-09-28 PROBLEM — R20.0 LEFT UPPER EXTREMITY NUMBNESS: Status: RESOLVED | Noted: 2023-05-17 | Resolved: 2023-09-28

## 2023-09-28 PROBLEM — R10.11 ACUTE ABDOMINAL PAIN IN RIGHT UPPER QUADRANT: Status: RESOLVED | Noted: 2023-05-17 | Resolved: 2023-09-28

## 2023-09-28 PROBLEM — M62.81 MUSCLE WEAKNESS OF EXTREMITY: Status: RESOLVED | Noted: 2023-05-17 | Resolved: 2023-09-28

## 2023-09-28 RX ORDER — PHENOL 1.4 %
2 AEROSOL, SPRAY (ML) MUCOUS MEMBRANE NIGHTLY
COMMUNITY
Start: 2018-12-14 | End: 2023-12-20 | Stop reason: ALTCHOICE

## 2023-09-29 VITALS — WEIGHT: 307.32 LBS | HEIGHT: 73 IN | BODY MASS INDEX: 40.73 KG/M2

## 2023-09-30 NOTE — H&P
History & Physical Reviewed:   I have reviewed the History and Physical dated:  12-Jun-2023   History and Physical reviewed and relevant findings noted. Patient examined to review pertinent physical  findings.: No significant changes   Home Medications Reviewed: no changes noted   Allergies Reviewed: no changes noted       ERAS (Enhanced Recovery After Surgery):  ·  ERAS Patient: no     Consent:   COVID-19 Consent:  ·  COVID-19 Risk Consent Surgeon has reviewed key risks related to the risk of yari COVID-19 and if they contract COVID-19 what the risks are.       Electronic Signatures:  Marielena Ch (PA (PHYSICIAN))  (Signed 27-Jun-2023 09:46)   Authored: History & Physical Reviewed, ERAS, Consent,  Note Completion      Last Updated: 27-Jun-2023 09:46 by Marielena Ch (PA (PHYSICIAN))

## 2023-10-02 NOTE — OP NOTE
PROCEDURE DETAILS    Preoperative Diagnosis:  Benign prostatic hyperplasia with obstruction  Postoperative Diagnosis:  Benign prostatic hyperplasia with obstruction  Surgeon: Haider Gallagher  Resident/Fellow/Other Assistant: None of these were associated with this case    Procedure:  1. CYSTOURETHROSCOPY W/ INSERTION OF PROSTATIC IMPLANT UROLIFT    Anesthesia: No anesthesiologist associated with this case  Estimated Blood Loss: 1 cc  Findings: high bladder neck, small prostate  Specimens(s) Collected: no,     Complications: none  Drains and/or Catheters: none  Patient Returned To/Condition: PACU/stable        Operative Report:   Patient was  consented and antibiotics were administered in the preop area.   Under MAC, with the patient in the dorsal lithotomy position, genitalia was scrubbed  and draped in the usual manner.  #22 cystoscope was inserted down the urethra and cystoscopy revealed a small obstructing prostate with high bladder neck, and a moderately trabeculated bladder. The urolift implant were applied on the prostate, first closer to the bladder neck on both  sides, then the fossa was reinspected and 2 more implants were placed distally near the verumontanum. This totalled to 4 clips used, and resulted in a wide open prostate.   There was minimal bleeding from the prostate.  No Araujo catheter was placed.  This concluded the procedure, patient tolerated it well, was awakened and transferred to PACU in stable condition.                          Attestation:   Note Completion:  Attending Attestation I performed the procedure without a resident         Electronic Signatures:  Haider Gallagher)  (Signed 27-Jun-2023 14:21)   Authored: Post-Operative Note, Chart Review, Note Completion      Last Updated: 27-Jun-2023 14:21 by Haider Gallagher)

## 2023-10-04 ENCOUNTER — TELEMEDICINE CLINICAL SUPPORT (OUTPATIENT)
Dept: PHARMACY | Facility: HOSPITAL | Age: 68
End: 2023-10-04
Payer: MEDICARE

## 2023-10-04 DIAGNOSIS — Z86.010 PERSONAL HISTORY OF COLONIC POLYPS: Primary | ICD-10-CM

## 2023-10-04 DIAGNOSIS — E11.9 TYPE 2 DIABETES MELLITUS WITHOUT COMPLICATION, WITHOUT LONG-TERM CURRENT USE OF INSULIN (MULTI): Primary | ICD-10-CM

## 2023-10-04 RX ORDER — POLYETHYLENE GLYCOL 3350, SODIUM SULFATE ANHYDROUS, SODIUM BICARBONATE, SODIUM CHLORIDE, POTASSIUM CHLORIDE 236; 22.74; 6.74; 5.86; 2.97 G/4L; G/4L; G/4L; G/4L; G/4L
4000 POWDER, FOR SOLUTION ORAL ONCE
Qty: 4000 ML | Refills: 0 | Status: SHIPPED | OUTPATIENT
Start: 2023-10-04 | End: 2023-10-04

## 2023-10-04 NOTE — PROGRESS NOTES
Pharmacist Clinic: Diabetes Management  Shawn Romero is a 68 y.o. male was referred to Clinical Pharmacy Team for diabetes management. At last visit, no medication changes were made.     Referring Provider: Christopher D'Amico, DO     HISTORY OF PRESENT ILLNESS  Patient is an insulin dependent T2DM. Historically he was unable to afford meds due to cost. He is approved for the  PAP for ozempic and repatha.     LAB REVIEW   Glucose (mg/dL)   Date Value   06/16/2023 151 (H)   05/12/2023 131 (H)   10/07/2022 179 (H)     Hemoglobin A1C (%)   Date Value   09/08/2023 7.0 (A)   05/12/2023 8.4 (A)   10/07/2022 7.7 (A)     Bicarbonate (mmol/L)   Date Value   06/16/2023 36 (H)   05/12/2023 32   10/07/2022 31     Urea Nitrogen (mg/dL)   Date Value   06/16/2023 18   05/12/2023 18   10/07/2022 12     Creatinine (mg/dL)   Date Value   06/16/2023 0.92   05/12/2023 0.86   10/07/2022 0.89     Lab Results   Component Value Date    HGBA1C 7.0 (A) 09/08/2023    HGBA1C 8.4 (A) 05/12/2023    HGBA1C 7.7 (A) 10/07/2022     Lab Results   Component Value Date    CHOL 119 09/08/2023    CHOL 220 (H) 05/12/2023    CHOL 229 (H) 10/07/2022     Lab Results   Component Value Date    HDL 49.3 09/08/2023    HDL 43.8 05/12/2023    HDL 48.9 10/07/2022     Lab Results   Component Value Date    TRIG 189 (H) 09/08/2023    TRIG 187 (H) 05/12/2023    TRIG 195 (H) 10/07/2022     DIABETES ASSESSMENT    CURRENT PHARMACOTHERAPY  - metformin 1000 mg 1 by mouth twice daily   - lantus 100U/3ml 70 units nightly  - jardiance 10 mg daily    - humalog 10-14 units with meals 2-3 times per day  - ozempic 1 mg under the skin once weekly (injects on wednesdays)    SECONDARY PREVENTION  - Statin? No; patient cannot tolerate statins, on PCSK9i  - ACE-I/ARB? No    SMBG: pt uses freestyle bijan to check his blood glucose    DISCUSSION  - Patient is tolerating his medications without issue  - Plan to follow up in 1 month to ensure easy transition     RECOMMENDATIONS/PLAN  1.  Patients diabetes is poorly controlled with most recent A1c of 7.0 % (goal < 7 %).   - Continue all meds under the continuation of care with the referring provider and clinical pharmacy team.    2. Future considerations: increase jardiance to 25 mg    Clinical Pharmacist follow up: 1 month    Thank you,  Janessa Caro, PharmD    Verbal consent to manage patient's drug therapy was obtained from the patient. They were informed they may decline to participate or withdraw from participation in pharmacy services at any time.

## 2023-10-16 ENCOUNTER — PHARMACY VISIT (OUTPATIENT)
Dept: PHARMACY | Facility: CLINIC | Age: 68
End: 2023-10-16
Payer: COMMERCIAL

## 2023-10-16 PROCEDURE — RXMED WILLOW AMBULATORY MEDICATION CHARGE

## 2023-10-18 ENCOUNTER — PHARMACY VISIT (OUTPATIENT)
Dept: PHARMACY | Facility: CLINIC | Age: 68
End: 2023-10-18
Payer: COMMERCIAL

## 2023-10-18 PROCEDURE — RXMED WILLOW AMBULATORY MEDICATION CHARGE

## 2023-10-19 ENCOUNTER — TELEPHONE (OUTPATIENT)
Dept: PHARMACY | Facility: HOSPITAL | Age: 68
End: 2023-10-19
Payer: MEDICARE

## 2023-10-19 ENCOUNTER — PHARMACY VISIT (OUTPATIENT)
Dept: PHARMACY | Facility: CLINIC | Age: 68
End: 2023-10-19
Payer: COMMERCIAL

## 2023-10-19 DIAGNOSIS — E11.9 TYPE 2 DIABETES MELLITUS WITHOUT COMPLICATION, WITHOUT LONG-TERM CURRENT USE OF INSULIN (MULTI): Primary | ICD-10-CM

## 2023-10-19 PROCEDURE — RXMED WILLOW AMBULATORY MEDICATION CHARGE

## 2023-10-19 RX ORDER — SEMAGLUTIDE 1.34 MG/ML
1 INJECTION, SOLUTION SUBCUTANEOUS
Qty: 3 ML | Refills: 2 | Status: SHIPPED | OUTPATIENT
Start: 2023-10-19 | End: 2023-12-20 | Stop reason: DRUGHIGH

## 2023-10-19 NOTE — TELEPHONE ENCOUNTER
Patient called to inform me that he needs a refill of Ozempic 1 mg.     Deferring dosage increase at this time due to backorder.     Thanks,   Janessa

## 2023-10-25 ENCOUNTER — OFFICE VISIT (OUTPATIENT)
Dept: NEUROLOGY | Facility: CLINIC | Age: 68
End: 2023-10-25
Payer: MEDICARE

## 2023-10-25 VITALS
DIASTOLIC BLOOD PRESSURE: 81 MMHG | BODY MASS INDEX: 39.63 KG/M2 | SYSTOLIC BLOOD PRESSURE: 125 MMHG | HEART RATE: 85 BPM | HEIGHT: 73 IN | WEIGHT: 299 LBS

## 2023-10-25 DIAGNOSIS — E11.9 TYPE 2 DIABETES MELLITUS WITHOUT COMPLICATION, WITHOUT LONG-TERM CURRENT USE OF INSULIN (MULTI): ICD-10-CM

## 2023-10-25 DIAGNOSIS — M79.2 NEUROPATHIC PAIN OF UPPER EXTREMITY: ICD-10-CM

## 2023-10-25 DIAGNOSIS — G70.00 OCULAR MYASTHENIA GRAVIS (MULTI): Primary | ICD-10-CM

## 2023-10-25 PROCEDURE — 1159F MED LIST DOCD IN RCRD: CPT | Performed by: PSYCHIATRY & NEUROLOGY

## 2023-10-25 PROCEDURE — 99214 OFFICE O/P EST MOD 30 MIN: CPT | Performed by: PSYCHIATRY & NEUROLOGY

## 2023-10-25 PROCEDURE — 3051F HG A1C>EQUAL 7.0%<8.0%: CPT | Performed by: PSYCHIATRY & NEUROLOGY

## 2023-10-25 PROCEDURE — 3074F SYST BP LT 130 MM HG: CPT | Performed by: PSYCHIATRY & NEUROLOGY

## 2023-10-25 PROCEDURE — 3079F DIAST BP 80-89 MM HG: CPT | Performed by: PSYCHIATRY & NEUROLOGY

## 2023-10-25 PROCEDURE — 1160F RVW MEDS BY RX/DR IN RCRD: CPT | Performed by: PSYCHIATRY & NEUROLOGY

## 2023-10-25 PROCEDURE — 1125F AMNT PAIN NOTED PAIN PRSNT: CPT | Performed by: PSYCHIATRY & NEUROLOGY

## 2023-10-25 RX ORDER — PREDNISONE 1 MG/1
4 TABLET ORAL DAILY
Qty: 120 TABLET | Refills: 6 | Status: SHIPPED | OUTPATIENT
Start: 2023-10-25 | End: 2024-05-29 | Stop reason: SDUPTHER

## 2023-10-25 NOTE — PROGRESS NOTES
Subjective     Shawn Romero is a 68 y.o. year old male seen in follow-up for ocular myasthenia gravis and neuropathic pain of left upper extremity    HPI    68-year-old  man with past medical history significant for type II diabetes, hyperlipidemia, obesity, GERD, osteoarthritis particularly including chronic left knee pain status post multiple orthopedic surgeries, remote former smoking.    I evaluated him initially and most recently on 3/14/2023.  See detailed in my ambulatory EMR note from that date he presented for ongoing management of ocular myasthenia and left upper extremity neuropathic pain status post ulnar neuropathy at the elbow surgery.  He was previously managed by Dr. Dee and transferred neurologic care to me because of Dr. Dee's USP.    He has history of myasthenia gravis dating to 2015 and has been evaluated by multiple neuro-ophthalmologist.  He has not had convincing symptoms of generalization and has been maintained on prednisone 4 mg daily (with worsening of diplopia at lower levels [place] (and pyridostigmine 90 mg 3 times daily.    His left upper extremity neuropathic pain has responded to brand-name Lyrica but did not respond to gabapentin or generic pregabalin.    Because of requirement for Lyrica I had him sign a controlled substance agreement and take the required urine drug screen test.    He is evaluated today in the office.    From the standpoint of myasthenia gravis he reports stability.  His eyes get out of focus with prolonged reading and this resolves if he rests the eyes for a minute.  He notes occasional ptosis which tends to be on the right.  At times he notes food to catch when he is swallowing but this is a longer standing issue that has not historically been attributed to myasthenia.  Once in a while when he is drinking liquid it goes down the wrong pipe and he coughs.  The only dyspnea he notes is with exertion that he attributes to being overweight.  He is  vague as to dysarthria, indicating occasionally his speech gets a little tripped up after talking for a while.  He does not endorse head drop.  He does not endorse any progressive or consistent limb weakness but does have rotator cuff issues particularly on the right that limit his proximal arm strength at times.    From the standpoint of neuropathic pain he continues to note reasonably good control with brand-name Lyrica 75 mg twice daily.  He tolerates Lyrica well with the only noted side effect being mild bilateral ankle edema.    He threw his back out roughly 3 weeks ago, simply turning at the waist after getting out of the car and standing up in a parking lot.  His back pain has gradually improved since then.  He is taking tramadol and orphenadrine for it.  He is not endorsing sciatica at this time.    He saw an orthopedist to undergo left knee arthrocentesis after developing swelling in the joint.    He is on multiple agents for diabetes including among others Ozempic and Jardiance.  He has a continuous glucose monitor.        Review of Systems    As per the history of present illness    Patient Active Problem List   Diagnosis    Obesity, morbid (CMS/[facility])    Type 2 diabetes mellitus without complication, without long-term current use of insulin (CMS/HCC)    BPH (benign prostatic hyperplasia)    Hyperlipidemia LDL goal <100    Erectile dysfunction    Elevated PSA    GERD (gastroesophageal reflux disease)    Achilles tendinitis of left lower extremity    Acquired short Achilles tendon of left lower extremity    Attention and concentration deficit    Blurred vision, bilateral    Chronic back pain    Bone spur of foot    Borderline glaucoma with ocular hypertension    Chondromalacia of knee, right    Convergence insufficiency    Cubital tunnel syndrome on left    Dermatitis, eczematoid    Myasthenia gravis (CMS/HCC)    Age-related nuclear cataract of right eye    Senile nuclear sclerosis    Sesamoiditis    Vitamin  B12 deficiency    Vitamin D deficiency    Weight gain    Age-related nuclear cataract of left eye    Refractive error    Astigmatism    Ptosis of right eyelid    Pronation of both feet    Primary localized osteoarthrosis of left lower leg    Plantar fasciitis, left    Paralytic strabismus, sixth or abducens nerve palsy    Nicotine dependence in remission    Myalgia due to statin    Internal derangement of left knee    Insomnia    Inguinal bulge    Skin lesion    Hernia of anterior abdominal wall    Fatigue    Diplopia     Past Medical History:   Diagnosis Date    Diverticulosis of large intestine without perforation or abscess without bleeding 06/15/2015    Diverticulosis of colon    Other conditions influencing health status 04/21/2014    Myalgia and myositis    Other muscle spasm 06/15/2015    Muscle spasm    Personal history of nicotine dependence 07/06/2016    History of nicotine dependence    Personal history of other specified conditions 04/21/2014    History of chest pain    Personal history of other specified conditions 04/21/2014    History of abdominal pain     Past Surgical History:   Procedure Laterality Date    HERNIA REPAIR  09/05/2013    Hernia Repair     Social History     Tobacco Use    Smoking status: Unknown    Smokeless tobacco: Not on file   Substance Use Topics    Alcohol use: Not on file     family history includes Arthritis in an other family member; Bell's palsy in his brother; Cancer in his father; Diabetes in his father; Heart disease in his father; Kidney disease in his father.    Current Outpatient Medications:     alpha lipoic acid 600 mg capsule, Take 1 capsule by mouth once daily., Disp: , Rfl:     ascorbic acid (Vitamin C) 1,000 mg tablet, Take 1 tablet (1,000 mg) by mouth once daily., Disp: , Rfl:     chlorhexidine (Peridex) 0.12 % solution, Take by mouth., Disp: , Rfl:     cholecalciferol (Vitamin D-3) 25 MCG (1000 UT) tablet, Take 1 tablet (25 mcg) by mouth once daily., Disp: ,  Rfl:     cyanocobalamin, vitamin B-12, 3,000 mcg capsule, Take by mouth., Disp: , Rfl:     empagliflozin (Jardiance) 10 mg, TAKE 1 TABLET (10 MG) BY MOUTH ONCE DAILY., Disp: 90 tablet, Rfl: 3    evolocumab (Repatha SureClick) 140 mg/mL injection, INJECT 1 ML (140 MG) UNDER THE SKIN EVERY 14 (FOURTEEN) DAYS., Disp: 2 mL, Rfl: 11    FreeStyle Kassie reader (FreeStyle Kassie 2 [place]) misc, Use as instructed to check BG., Disp: 1 each, Rfl: 0    FreeStyle Kassie sensor system kit, USE AS INSTRUCTED TO CHECK BLOOD GLUCOSE, Disp: 2 each, Rfl: 11    glucosamine-D3-Boswellia serr (Osteo Bi-Flex, 5-Loxin,) 1,500-400-100 mg-unit-mg tablet, Take by mouth., Disp: , Rfl:     insulin lispro (HumaLOG) 100 unit/mL injection, Inject 12-14 Units under the skin 3 times a day with meals., Disp: 30 mL, Rfl: 1    insulin lispro (HumaLOG) 100 unit/mL injection, INJECT 12-14 UNITS UNDER THE SKIN THREE TIMES A DAY WITH MEALS., Disp: 30 mL, Rfl: 1    Lantus Solostar U-100 Insulin 100 unit/mL (3 mL) pen, INJECT 70 UNITS UNDER THE SKIN EVERY NIGHT AT BEDTIME. TAKE AS DIRECTED PER INSULIN INSTRUCTIONS., Disp: 60 mL, Rfl: 1    loratadine (Claritin) 10 mg tablet, Take 1 tablet (10 mg) by mouth once daily at bedtime., Disp: 90 tablet, Rfl: 3    Lyrica 75 mg capsule, Take 1 capsule (75 mg) by mouth 2 times a day., Disp: , Rfl:     melatonin-lemon balm leaf extr 10-1 mg tablet, Take 2 tablets by mouth once daily at bedtime., Disp: , Rfl:     meloxicam (Mobic) 15 mg tablet, Take 1 tablet (15 mg) by mouth once daily. Take with food., Disp: , Rfl:     metFORMIN (Glucophage) 1,000 mg tablet, Take 1 tablet (1,000 mg) by mouth 2 times a day with meals., Disp: 60 tablet, Rfl: 3    multivitamin tablet, Take 1 tablet by mouth once daily., Disp: , Rfl:     OneTouch Ultra Test strip, USE 1 STRIP 3 TIMES DAILY., Disp: , Rfl:     orphenadrine (Norflex) 100 mg 12 hr tablet, Take 1 tablet (100 mg) by mouth once daily., Disp: 30 tablet, Rfl: 1    pantoprazole  "(ProtoNix) 40 mg EC tablet, Take 1 tablet (40 mg) by mouth once daily., Disp: , Rfl:     pen needle, diabetic 31 gauge x 5/16\" needle, USE TO INJECT FOUR TIMES A DAY AS DIRECTED., Disp: 400 each, Rfl: 3    phenazopyridine (Pyridium) 200 mg tablet, Take 1 tablet (200 mg) by mouth 3 times a day., Disp: , Rfl:     predniSONE (Deltasone) 1 mg tablet, Take 4 tablets (4 mg) by mouth once daily., Disp: 120 tablet, Rfl: 0    pyridostigmine (Mestinon) 60 mg tablet, Take 1.5 tablets (90 mg) by mouth 3 times a day., Disp: , Rfl:     semaglutide (Ozempic) 1 mg/dose (4 mg/3 mL) pen injector, Inject 1 mg under the skin 1 (one) time per week., Disp: 3 mL, Rfl: 2    sucralfate (Carafate) 1 gram tablet, Take by mouth., Disp: , Rfl:     tadalafil 20 mg tablet, Take by mouth., Disp: , Rfl:     tamsulosin (Flomax) 0.4 mg 24 hr capsule, Take 2 capsules (0.8 mg) by mouth once daily., Disp: 180 capsule, Rfl: 1    traMADol (Ultram) 50 mg tablet, Take 1 tablet (50 mg) by mouth 3 times a day., Disp: 90 tablet, Rfl: 0    triamcinolone (Kenalog) 0.1 % ointment, APPLY GENTLY TO TO THE AFFECTED AREA THREE TIMES DAILY TO FOUR TIMES DAILY, Disp: , Rfl:     Current Facility-Administered Medications:     methylPREDNISolone acetate (DEPO-Medrol) injection 80 mg, 80 mg, intramuscular, Once, Christopher D'Amico, DO  Allergies   Allergen Reactions    Hydrocodone-Acetaminophen Anxiety, Dizziness, Palpitations and Shortness of breath    Oxycodone-Acetaminophen Anxiety, Itching, Palpitations, Rash and Shortness of breath    Cholestyramine Bleeding    Shellfish Derived Unknown    Sulfa (Sulfonamide Antibiotics) Bleeding    Codeine Anxiety, Hives, Itching, Rash and Swelling    Iodine Anxiety, Diarrhea, Itching, Rash and Swelling    Penicillins Anxiety, Palpitations, Rash and Swelling    Statins-Hmg-Coa Reductase Inhibitors Rash    Sulfamethoxazole-Trimethoprim Itching, Rash and Swelling       Objective   Neurological Exam  Physical Exam    General: Alert man " who was ambulatory without assistive devices.  Speaking comfortably in complete sentences without dyspnea or staccato.    Cranial Nerves:  Extraocular movements were intact and conjugate without nystagmus.  Negligible ptosis OD.   Facial motor function was symmetrically intact and there was no impression of facial weakness at rest.  Hearing was grossly intact.  No dysarthria dysphonia.  Shoulder shrug was symmetric.      Motor: Muscle tone was normal throughout.  Confrontation strength was 5/5 at neck flexors and neck extensors and throughout the upper and lower extremities with the exception of 4-4+ left triceps that appeared to be confounded by pain.    Station: Intact and stable.    Gait: Stable and unremarkable.        Assessment/Plan     He appears neurologically stable from the standpoint of ocular myasthenia gravis, without definite features of generalized MG.  Symptom control is adequate on the present regimen of prednisone 4 mg daily (with history of diplopia at 3 mg) and Mestinon 90 mg 3 times daily.  I did not recommend any change from his baseline regimen.  He did not require Mestinon refill today but I did send an order for prednisone to his pharmacy.    He reports adequate control of left upper extremity neuropathic pain with brand-name Lyrica at 75 mg twice daily.  He was quite insistent today that generic pregabalin did not adequately control his pain, nor did gabapentin.  He may require an appeal to continue on the brand name in 2024 due to insurance issues.  As above he is on a controlled substance agreement.  He did not require Lyrica refill today.    I advised him to follow-up in the office in 6 months.

## 2023-10-25 NOTE — PATIENT INSTRUCTIONS
Your neurologic exam is stable today.    I sent a refill order for prednisone to your pharmacy.    Contact the office when you need Lyrica refills.    Please see me in 6 months in the office.

## 2023-10-31 DIAGNOSIS — K21.9 GASTROESOPHAGEAL REFLUX DISEASE, UNSPECIFIED WHETHER ESOPHAGITIS PRESENT: ICD-10-CM

## 2023-10-31 DIAGNOSIS — M54.50 ACUTE LOW BACK PAIN, UNSPECIFIED BACK PAIN LATERALITY, UNSPECIFIED WHETHER SCIATICA PRESENT: ICD-10-CM

## 2023-10-31 DIAGNOSIS — E11.69 TYPE 2 DIABETES MELLITUS WITH OTHER SPECIFIED COMPLICATION, UNSPECIFIED WHETHER LONG TERM INSULIN USE (MULTI): ICD-10-CM

## 2023-10-31 RX ORDER — METFORMIN HYDROCHLORIDE 1000 MG/1
1000 TABLET ORAL
Qty: 180 TABLET | Refills: 1 | Status: SHIPPED | OUTPATIENT
Start: 2023-10-31 | End: 2023-12-12 | Stop reason: SDUPTHER

## 2023-10-31 RX ORDER — SUCRALFATE 1 G/1
1 TABLET ORAL DAILY PRN
Qty: 90 TABLET | Refills: 1 | Status: SHIPPED | OUTPATIENT
Start: 2023-10-31 | End: 2023-12-12 | Stop reason: SDUPTHER

## 2023-10-31 RX ORDER — PANTOPRAZOLE SODIUM 40 MG/1
40 TABLET, DELAYED RELEASE ORAL DAILY
Qty: 90 TABLET | Refills: 1 | Status: SHIPPED | OUTPATIENT
Start: 2023-10-31 | End: 2023-12-12 | Stop reason: SDUPTHER

## 2023-10-31 RX ORDER — ORPHENADRINE CITRATE 100 MG/1
100 TABLET, EXTENDED RELEASE ORAL DAILY
Qty: 90 TABLET | Refills: 1 | Status: SHIPPED | OUTPATIENT
Start: 2023-10-31 | End: 2023-12-12 | Stop reason: SDUPTHER

## 2023-11-01 ENCOUNTER — TELEMEDICINE (OUTPATIENT)
Dept: PHARMACY | Facility: HOSPITAL | Age: 68
End: 2023-11-01
Payer: MEDICARE

## 2023-11-01 DIAGNOSIS — E11.9 TYPE 2 DIABETES MELLITUS WITHOUT COMPLICATION, WITHOUT LONG-TERM CURRENT USE OF INSULIN (MULTI): Primary | ICD-10-CM

## 2023-11-01 NOTE — PROGRESS NOTES
Pharmacist Clinic: Diabetes Management  Shawn Romero is a 68 y.o. male was referred to Clinical Pharmacy Team for diabetes management. At last visit, no medication changes were made.     Referring Provider: Christopher D'Amico, DO     HISTORY OF PRESENT ILLNESS  Patient is an insulin dependent T2DM. Historically he was unable to afford meds due to cost. He is approved for the  PAP for ozempic and repatha.     LAB REVIEW   Glucose (mg/dL)   Date Value   06/16/2023 151 (H)   05/12/2023 131 (H)   10/07/2022 179 (H)     Hemoglobin A1C (%)   Date Value   09/08/2023 7.0 (A)   05/12/2023 8.4 (A)   10/07/2022 7.7 (A)     Bicarbonate (mmol/L)   Date Value   06/16/2023 36 (H)   05/12/2023 32   10/07/2022 31     Urea Nitrogen (mg/dL)   Date Value   06/16/2023 18   05/12/2023 18   10/07/2022 12     Creatinine (mg/dL)   Date Value   06/16/2023 0.92   05/12/2023 0.86   10/07/2022 0.89     Lab Results   Component Value Date    HGBA1C 7.0 (A) 09/08/2023    HGBA1C 8.4 (A) 05/12/2023    HGBA1C 7.7 (A) 10/07/2022     Lab Results   Component Value Date    CHOL 119 09/08/2023    CHOL 220 (H) 05/12/2023    CHOL 229 (H) 10/07/2022     Lab Results   Component Value Date    HDL 49.3 09/08/2023    HDL 43.8 05/12/2023    HDL 48.9 10/07/2022     Lab Results   Component Value Date    TRIG 189 (H) 09/08/2023    TRIG 187 (H) 05/12/2023    TRIG 195 (H) 10/07/2022     DIABETES ASSESSMENT    CURRENT PHARMACOTHERAPY  - metformin 1000 mg 1 by mouth twice daily   - lantus 100U/3ml 70 units nightly  - jardiance 10 mg daily    - humalog 10-14 units with meals 2-3 times per day  - ozempic 1 mg under the skin once weekly (injects on wednesdays)    SECONDARY PREVENTION  - Statin? No; patient cannot tolerate statins, on PCSK9i  - ACE-I/ARB? No    SMBG: pt uses freestyle bijan to check his blood glucose    DISCUSSION  - Patient is tolerating his medications without issue  - Plan to follow up in 1 month to ensure easy transition     RECOMMENDATIONS/PLAN  1.  Patients diabetes is well controlled with most recent A1c of 7.0 % (goal < 7 %).   - Continue all meds under the continuation of care with the referring provider and clinical pharmacy team.    2. Future considerations: increase jardiance to 25 mg    Clinical Pharmacist follow up: 1 month    Thank you,  Janessa Caro, PharmD    Verbal consent to manage patient's drug therapy was obtained from the patient. They were informed they may decline to participate or withdraw from participation in pharmacy services at any time.

## 2023-11-09 ENCOUNTER — PHARMACY VISIT (OUTPATIENT)
Dept: PHARMACY | Facility: CLINIC | Age: 68
End: 2023-11-09
Payer: COMMERCIAL

## 2023-11-09 PROCEDURE — RXMED WILLOW AMBULATORY MEDICATION CHARGE

## 2023-11-10 ENCOUNTER — PHARMACY VISIT (OUTPATIENT)
Dept: PHARMACY | Facility: CLINIC | Age: 68
End: 2023-11-10
Payer: COMMERCIAL

## 2023-11-10 PROCEDURE — RXMED WILLOW AMBULATORY MEDICATION CHARGE

## 2023-11-13 ENCOUNTER — TELEPHONE (OUTPATIENT)
Dept: PRIMARY CARE | Facility: CLINIC | Age: 68
End: 2023-11-13
Payer: MEDICARE

## 2023-11-13 NOTE — TELEPHONE ENCOUNTER
Patient has colonoscopy scheduled for next Monday and he was advised to talk to his PCP about his insulin dosage prior to the procedure

## 2023-11-17 ENCOUNTER — PHARMACY VISIT (OUTPATIENT)
Dept: PHARMACY | Facility: CLINIC | Age: 68
End: 2023-11-17
Payer: COMMERCIAL

## 2023-11-17 PROCEDURE — RXMED WILLOW AMBULATORY MEDICATION CHARGE

## 2023-11-18 ENCOUNTER — PHARMACY VISIT (OUTPATIENT)
Dept: PHARMACY | Facility: CLINIC | Age: 68
End: 2023-11-18
Payer: COMMERCIAL

## 2023-11-20 ENCOUNTER — OFFICE VISIT (OUTPATIENT)
Dept: GASTROENTEROLOGY | Facility: EXTERNAL LOCATION | Age: 68
End: 2023-11-20
Payer: MEDICARE

## 2023-11-20 DIAGNOSIS — D12.3 BENIGN NEOPLASM OF TRANSVERSE COLON: ICD-10-CM

## 2023-11-20 DIAGNOSIS — Z86.010 PERSONAL HISTORY OF COLONIC POLYPS: ICD-10-CM

## 2023-11-20 DIAGNOSIS — D12.8 BENIGN NEOPLASM OF RECTUM AND ANAL CANAL: ICD-10-CM

## 2023-11-20 DIAGNOSIS — Z12.11 COLON CANCER SCREENING: Primary | ICD-10-CM

## 2023-11-20 DIAGNOSIS — D12.9 BENIGN NEOPLASM OF RECTUM AND ANAL CANAL: ICD-10-CM

## 2023-11-20 PROCEDURE — 45385 COLONOSCOPY W/LESION REMOVAL: CPT | Performed by: INTERNAL MEDICINE

## 2023-11-20 PROCEDURE — 1125F AMNT PAIN NOTED PAIN PRSNT: CPT | Performed by: INTERNAL MEDICINE

## 2023-11-20 PROCEDURE — 1160F RVW MEDS BY RX/DR IN RCRD: CPT | Performed by: INTERNAL MEDICINE

## 2023-11-20 PROCEDURE — 3051F HG A1C>EQUAL 7.0%<8.0%: CPT | Performed by: INTERNAL MEDICINE

## 2023-11-20 PROCEDURE — 88305 TISSUE EXAM BY PATHOLOGIST: CPT

## 2023-11-20 PROCEDURE — 1159F MED LIST DOCD IN RCRD: CPT | Performed by: INTERNAL MEDICINE

## 2023-11-20 PROCEDURE — 88305 TISSUE EXAM BY PATHOLOGIST: CPT | Performed by: STUDENT IN AN ORGANIZED HEALTH CARE EDUCATION/TRAINING PROGRAM

## 2023-11-21 ENCOUNTER — LAB REQUISITION (OUTPATIENT)
Dept: LAB | Facility: HOSPITAL | Age: 68
End: 2023-11-21
Payer: MEDICARE

## 2023-11-21 DIAGNOSIS — E11.9 TYPE 2 DIABETES MELLITUS WITHOUT COMPLICATION, WITHOUT LONG-TERM CURRENT USE OF INSULIN (MULTI): ICD-10-CM

## 2023-11-22 ENCOUNTER — PHARMACY VISIT (OUTPATIENT)
Dept: PHARMACY | Facility: CLINIC | Age: 68
End: 2023-11-22
Payer: COMMERCIAL

## 2023-11-22 PROCEDURE — RXMED WILLOW AMBULATORY MEDICATION CHARGE

## 2023-11-27 RX ORDER — PEN NEEDLE, DIABETIC 30 GX3/16"
NEEDLE, DISPOSABLE MISCELLANEOUS
Qty: 400 EACH | Refills: 3 | Status: SHIPPED | OUTPATIENT
Start: 2023-11-27 | End: 2023-12-12

## 2023-11-28 DIAGNOSIS — G89.29 CHRONIC LOW BACK PAIN, UNSPECIFIED BACK PAIN LATERALITY, UNSPECIFIED WHETHER SCIATICA PRESENT: Primary | ICD-10-CM

## 2023-11-28 DIAGNOSIS — M54.50 CHRONIC LOW BACK PAIN, UNSPECIFIED BACK PAIN LATERALITY, UNSPECIFIED WHETHER SCIATICA PRESENT: Primary | ICD-10-CM

## 2023-11-28 DIAGNOSIS — M54.50 ACUTE LOW BACK PAIN, UNSPECIFIED BACK PAIN LATERALITY, UNSPECIFIED WHETHER SCIATICA PRESENT: ICD-10-CM

## 2023-11-29 ENCOUNTER — TELEMEDICINE (OUTPATIENT)
Dept: PHARMACY | Facility: HOSPITAL | Age: 68
End: 2023-11-29
Payer: MEDICARE

## 2023-11-29 DIAGNOSIS — E78.5 HYPERLIPIDEMIA LDL GOAL <100: ICD-10-CM

## 2023-11-29 DIAGNOSIS — E11.9 TYPE 2 DIABETES MELLITUS WITHOUT COMPLICATION, WITHOUT LONG-TERM CURRENT USE OF INSULIN (MULTI): Primary | ICD-10-CM

## 2023-11-29 RX ORDER — TRAMADOL HYDROCHLORIDE 50 MG/1
50 TABLET ORAL 3 TIMES DAILY
Qty: 90 TABLET | Refills: 0 | Status: SHIPPED | OUTPATIENT
Start: 2023-11-29 | End: 2024-03-26 | Stop reason: SDUPTHER

## 2023-11-29 RX ORDER — NALOXONE HYDROCHLORIDE 4 MG/.1ML
4 SPRAY NASAL AS NEEDED
Qty: 2 EACH | Refills: 0 | Status: SHIPPED | OUTPATIENT
Start: 2023-11-29 | End: 2023-12-12 | Stop reason: ALTCHOICE

## 2023-12-07 ENCOUNTER — PHARMACY VISIT (OUTPATIENT)
Dept: PHARMACY | Facility: CLINIC | Age: 68
End: 2023-12-07
Payer: COMMERCIAL

## 2023-12-07 LAB
LABORATORY COMMENT REPORT: NORMAL
PATH REPORT.FINAL DX SPEC: NORMAL
PATH REPORT.GROSS SPEC: NORMAL
PATH REPORT.RELEVANT HX SPEC: NORMAL
PATH REPORT.TOTAL CANCER: NORMAL

## 2023-12-07 PROCEDURE — RXMED WILLOW AMBULATORY MEDICATION CHARGE

## 2023-12-08 PROCEDURE — RXMED WILLOW AMBULATORY MEDICATION CHARGE

## 2023-12-12 ENCOUNTER — OFFICE VISIT (OUTPATIENT)
Dept: PRIMARY CARE | Facility: CLINIC | Age: 68
End: 2023-12-12
Payer: MEDICARE

## 2023-12-12 ENCOUNTER — PHARMACY VISIT (OUTPATIENT)
Dept: PHARMACY | Facility: CLINIC | Age: 68
End: 2023-12-12
Payer: COMMERCIAL

## 2023-12-12 VITALS
WEIGHT: 295 LBS | HEART RATE: 79 BPM | DIASTOLIC BLOOD PRESSURE: 72 MMHG | SYSTOLIC BLOOD PRESSURE: 142 MMHG | BODY MASS INDEX: 38.92 KG/M2 | OXYGEN SATURATION: 93 %

## 2023-12-12 DIAGNOSIS — E78.5 HYPERLIPIDEMIA, UNSPECIFIED HYPERLIPIDEMIA TYPE: ICD-10-CM

## 2023-12-12 DIAGNOSIS — I10 HYPERTENSION, UNSPECIFIED TYPE: ICD-10-CM

## 2023-12-12 DIAGNOSIS — G47.19 EXCESSIVE DAYTIME SLEEPINESS: ICD-10-CM

## 2023-12-12 DIAGNOSIS — T78.40XA ALLERGY, INITIAL ENCOUNTER: ICD-10-CM

## 2023-12-12 DIAGNOSIS — R06.83 SNORING: ICD-10-CM

## 2023-12-12 DIAGNOSIS — E11.69 TYPE 2 DIABETES MELLITUS WITH OTHER SPECIFIED COMPLICATION, UNSPECIFIED WHETHER LONG TERM INSULIN USE (MULTI): Primary | ICD-10-CM

## 2023-12-12 DIAGNOSIS — N40.0 BENIGN PROSTATIC HYPERPLASIA, UNSPECIFIED WHETHER LOWER URINARY TRACT SYMPTOMS PRESENT: ICD-10-CM

## 2023-12-12 DIAGNOSIS — Z79.4 TYPE 2 DIABETES MELLITUS WITHOUT COMPLICATION, WITH LONG-TERM CURRENT USE OF INSULIN (MULTI): ICD-10-CM

## 2023-12-12 DIAGNOSIS — E11.9 TYPE 2 DIABETES MELLITUS WITHOUT COMPLICATION, WITH LONG-TERM CURRENT USE OF INSULIN (MULTI): ICD-10-CM

## 2023-12-12 DIAGNOSIS — M54.50 ACUTE LOW BACK PAIN, UNSPECIFIED BACK PAIN LATERALITY, UNSPECIFIED WHETHER SCIATICA PRESENT: ICD-10-CM

## 2023-12-12 DIAGNOSIS — G70.00 OCULAR MYASTHENIA GRAVIS (MULTI): ICD-10-CM

## 2023-12-12 DIAGNOSIS — K21.9 GASTROESOPHAGEAL REFLUX DISEASE, UNSPECIFIED WHETHER ESOPHAGITIS PRESENT: ICD-10-CM

## 2023-12-12 DIAGNOSIS — N52.9 ERECTILE DYSFUNCTION, UNSPECIFIED ERECTILE DYSFUNCTION TYPE: ICD-10-CM

## 2023-12-12 DIAGNOSIS — E11.69 TYPE 2 DIABETES MELLITUS WITH OTHER SPECIFIED COMPLICATION, UNSPECIFIED WHETHER LONG TERM INSULIN USE (MULTI): ICD-10-CM

## 2023-12-12 DIAGNOSIS — E55.9 VITAMIN D DEFICIENCY: ICD-10-CM

## 2023-12-12 PROCEDURE — 3078F DIAST BP <80 MM HG: CPT | Performed by: STUDENT IN AN ORGANIZED HEALTH CARE EDUCATION/TRAINING PROGRAM

## 2023-12-12 PROCEDURE — 1159F MED LIST DOCD IN RCRD: CPT | Performed by: STUDENT IN AN ORGANIZED HEALTH CARE EDUCATION/TRAINING PROGRAM

## 2023-12-12 PROCEDURE — 3051F HG A1C>EQUAL 7.0%<8.0%: CPT | Performed by: STUDENT IN AN ORGANIZED HEALTH CARE EDUCATION/TRAINING PROGRAM

## 2023-12-12 PROCEDURE — 3077F SYST BP >= 140 MM HG: CPT | Performed by: STUDENT IN AN ORGANIZED HEALTH CARE EDUCATION/TRAINING PROGRAM

## 2023-12-12 PROCEDURE — 99214 OFFICE O/P EST MOD 30 MIN: CPT | Performed by: STUDENT IN AN ORGANIZED HEALTH CARE EDUCATION/TRAINING PROGRAM

## 2023-12-12 PROCEDURE — 1125F AMNT PAIN NOTED PAIN PRSNT: CPT | Performed by: STUDENT IN AN ORGANIZED HEALTH CARE EDUCATION/TRAINING PROGRAM

## 2023-12-12 PROCEDURE — 1160F RVW MEDS BY RX/DR IN RCRD: CPT | Performed by: STUDENT IN AN ORGANIZED HEALTH CARE EDUCATION/TRAINING PROGRAM

## 2023-12-12 RX ORDER — BLOOD SUGAR DIAGNOSTIC
STRIP MISCELLANEOUS
Qty: 100 EACH | Refills: 11 | Status: SHIPPED | OUTPATIENT
Start: 2023-12-12 | End: 2024-02-13 | Stop reason: SDUPTHER

## 2023-12-12 RX ORDER — METFORMIN HYDROCHLORIDE 1000 MG/1
1000 TABLET ORAL
Qty: 180 TABLET | Refills: 1 | Status: SHIPPED | OUTPATIENT
Start: 2023-12-12 | End: 2024-05-28 | Stop reason: SDUPTHER

## 2023-12-12 RX ORDER — SUCRALFATE 1 G/1
1 TABLET ORAL DAILY PRN
Qty: 90 TABLET | Refills: 1 | Status: SHIPPED | OUTPATIENT
Start: 2023-12-12 | End: 2024-03-26 | Stop reason: SDUPTHER

## 2023-12-12 RX ORDER — LORATADINE 10 MG/1
10 TABLET ORAL NIGHTLY
Qty: 90 TABLET | Refills: 3 | Status: SHIPPED | OUTPATIENT
Start: 2023-12-12 | End: 2024-05-28 | Stop reason: SDUPTHER

## 2023-12-12 RX ORDER — ORPHENADRINE CITRATE 100 MG/1
100 TABLET, EXTENDED RELEASE ORAL DAILY
Qty: 90 TABLET | Refills: 1 | Status: SHIPPED | OUTPATIENT
Start: 2023-12-12 | End: 2024-03-26 | Stop reason: SDUPTHER

## 2023-12-12 RX ORDER — PANTOPRAZOLE SODIUM 40 MG/1
40 TABLET, DELAYED RELEASE ORAL DAILY
Qty: 90 TABLET | Refills: 1 | Status: SHIPPED | OUTPATIENT
Start: 2023-12-12 | End: 2024-05-28 | Stop reason: SDUPTHER

## 2023-12-12 RX ORDER — TAMSULOSIN HYDROCHLORIDE 0.4 MG/1
0.8 CAPSULE ORAL DAILY
Qty: 180 CAPSULE | Refills: 1 | Status: SHIPPED | OUTPATIENT
Start: 2023-12-12 | End: 2024-03-26 | Stop reason: SDUPTHER

## 2023-12-12 RX ORDER — BLOOD SUGAR DIAGNOSTIC
STRIP MISCELLANEOUS
COMMUNITY
End: 2023-12-12 | Stop reason: SDUPTHER

## 2023-12-12 ASSESSMENT — ENCOUNTER SYMPTOMS
LOSS OF SENSATION IN FEET: 0
OCCASIONAL FEELINGS OF UNSTEADINESS: 0
DEPRESSION: 0

## 2023-12-12 ASSESSMENT — COLUMBIA-SUICIDE SEVERITY RATING SCALE - C-SSRS
6. HAVE YOU EVER DONE ANYTHING, STARTED TO DO ANYTHING, OR PREPARED TO DO ANYTHING TO END YOUR LIFE?: NO
1. IN THE PAST MONTH, HAVE YOU WISHED YOU WERE DEAD OR WISHED YOU COULD GO TO SLEEP AND NOT WAKE UP?: NO
2. HAVE YOU ACTUALLY HAD ANY THOUGHTS OF KILLING YOURSELF?: NO

## 2023-12-12 ASSESSMENT — PATIENT HEALTH QUESTIONNAIRE - PHQ9
2. FEELING DOWN, DEPRESSED OR HOPELESS: NOT AT ALL
1. LITTLE INTEREST OR PLEASURE IN DOING THINGS: NOT AT ALL
SUM OF ALL RESPONSES TO PHQ9 QUESTIONS 1 AND 2: 0

## 2023-12-12 NOTE — PROGRESS NOTES
68-year-old male presenting for 3-month follow-up on chronic conditions:    DMII  Stable, doing well on current regimen, finally at goal on last labs.  Continues to follow with [facility] pharmacist.    HTN  Currently not taking any medications, asymptomatic    HLD  Taking Repatha, tolerating well.    Chronic low back pain, chronic knee pain  Stable, tolerates current regimen well    ED  Stable    GERD  Stable    Ocular MG  Stable, following with neuro.    Snoring, excessive daytime sleepiness  Chronic, has been advised to get sleep apnea test in the past, did not go through with that.    12 point ROS reviewed and negative other than as stated in HPI    General: Alert, oriented, pleasant, in no mild distress  HEENT:      Head: normocephalic, atraumatic;      eyes: EOMI, no scleral icterus;   CV: Heart with regular rate and rhythm, normal S1/S2, no murmurs  Lungs: CTAB without wheezing, rhonchi or rales; good respiratory effort, no increased work of breathing  Neuro: Cranial nerves grossly intact; alert and oriented  Psych: Appropriate mood and affect    #DMII  -Last A1C: 7.0 date: 09/2023  -Last Albumin/Creat ration: WNL Date: 10/21/21  -Diabetic foot exam: Date:  -Diabetic eye exam: two years ago, ophthalmology referral today  -Current Medications: Lantus 60 units at bedtime, Humalog 12 units with meals, metformin 1000 mg twice daily, Jardiance 10 mg daily, Ozempic 1 mg weekly  -Statin: None, reports allergy to all statins  -ASA: None  -ACE/ARB: None  -Nutritionist: Referral pending  -Medication changes: We will continue to follow with [facility] pharmacist and titrate up Ozempic, would like to decrease insulin regimen, and see if we can keep blood sugar under goal    #HTN  - Slightly above goal in office  -Currently unmedicated, advised to monitor at home    #HLD  -Continue Repatha, doing very well  -Repeat lipid     #BPH  -Continue tamsulosin 0.4 mg 2 tablets daily, following with urology     #Chronic low back pain #chronic L  knee pain  -CSA and UDS UTD  -Continue tramadol 50 mg 3 times daily as needed  -Continue Norflex as needed     #ED  -Continue tadalafil 20 mg as needed     #GERD  -Continue pantoprazole 40 mg daily     #Ocular MG  -Continue to follow with neurology  -Current medications include prednisone 4 mg daily, pyridostigmine 60 mg 3 times daily    #Excessive daytime sleepiness #Snoring  - Home sleep apnea test pending, was told it will not be there till [place] time    Follow-up 3 months, Medicare due in February     Christopher D'Amico, DO

## 2023-12-20 ENCOUNTER — TELEMEDICINE (OUTPATIENT)
Dept: PHARMACY | Facility: HOSPITAL | Age: 68
End: 2023-12-20
Payer: MEDICARE

## 2023-12-20 ENCOUNTER — PHARMACY VISIT (OUTPATIENT)
Dept: PHARMACY | Facility: CLINIC | Age: 68
End: 2023-12-20
Payer: COMMERCIAL

## 2023-12-20 DIAGNOSIS — E78.5 HYPERLIPIDEMIA LDL GOAL <100: ICD-10-CM

## 2023-12-20 DIAGNOSIS — E11.9 TYPE 2 DIABETES MELLITUS WITHOUT COMPLICATION, WITHOUT LONG-TERM CURRENT USE OF INSULIN (MULTI): Primary | ICD-10-CM

## 2023-12-20 PROCEDURE — RXMED WILLOW AMBULATORY MEDICATION CHARGE

## 2023-12-20 RX ORDER — SEMAGLUTIDE 2.68 MG/ML
2 INJECTION, SOLUTION SUBCUTANEOUS
Qty: 3 ML | Refills: 3 | Status: SHIPPED | OUTPATIENT
Start: 2023-12-20 | End: 2024-03-30 | Stop reason: SDUPTHER

## 2023-12-20 NOTE — PROGRESS NOTES
Pharmacist Clinic: Diabetes Management  Shawn Romero is a 68 y.o. male was referred to Clinical Pharmacy Team for diabetes management. At last visit, no medication changes were made.     Referring Provider: Christopher D'Amico, DO     HISTORY OF PRESENT ILLNESS  Patient is an insulin dependent T2DM. Historically he was unable to afford meds due to cost. He is approved for the  PAP for ozempic and repatha.     LAB REVIEW   Glucose (mg/dL)   Date Value   2023 151 (H)   2023 131 (H)   10/07/2022 179 (H)     Hemoglobin A1C (%)   Date Value   2023 7.0 (A)   2023 8.4 (A)   10/07/2022 7.7 (A)     Bicarbonate (mmol/L)   Date Value   2023 36 (H)   2023 32   10/07/2022 31     Urea Nitrogen (mg/dL)   Date Value   2023 18   2023 18   10/07/2022 12     Creatinine (mg/dL)   Date Value   2023 0.92   2023 0.86   10/07/2022 0.89     Lab Results   Component Value Date    HGBA1C 7.0 (A) 2023    HGBA1C 8.4 (A) 2023    HGBA1C 7.7 (A) 10/07/2022     Lab Results   Component Value Date    CHOL 119 2023    CHOL 220 (H) 2023    CHOL 229 (H) 10/07/2022     Lab Results   Component Value Date    HDL 49.3 2023    HDL 43.8 2023    HDL 48.9 10/07/2022     Lab Results   Component Value Date    TRIG 189 (H) 2023    TRIG 187 (H) 2023    TRIG 195 (H) 10/07/2022     DIABETES ASSESSMENT    CURRENT PHARMACOTHERAPY  - metformin 1000 mg 1 by mouth twice daily   - lantus 100U/3ml 70 units nightly  - jardiance 10 mg daily    - humalog 10-14 units with meals 2-3 times per day  - ozempic 1 mg under the skin once weekly (injects on )    SECONDARY PREVENTION  - Statin? No; patient cannot tolerate statins, on PCSK9i  - ACE-I/ARB? No    SMBG:   - 7 day AV  - 30 day AV    DISCUSSION  - Patient is tolerating his medications without issue  - Increase ozempic to 2 mg under the skin once weekly    RECOMMENDATIONS/PLAN  1. Patients diabetes  is well controlled with most recent A1c of 7.0 % (goal < 7 %).   - Continue all meds under the continuation of care with the referring provider and clinical pharmacy team.  - Increase ozempic to 2 mg under the skin once weekly    2. Future considerations: increase jardiance to 25 mg    Clinical Pharmacist follow up: 1 month    Thank you,  Janessa Caro, PharmD    Verbal consent to manage patient's drug therapy was obtained from the patient. They were informed they may decline to participate or withdraw from participation in pharmacy services at any time.

## 2023-12-22 ENCOUNTER — CLINICAL SUPPORT (OUTPATIENT)
Dept: SLEEP MEDICINE | Facility: HOSPITAL | Age: 68
End: 2023-12-22
Payer: MEDICARE

## 2023-12-22 DIAGNOSIS — R06.83 SNORING: ICD-10-CM

## 2023-12-22 DIAGNOSIS — G47.19 EXCESSIVE DAYTIME SLEEPINESS: ICD-10-CM

## 2023-12-22 PROCEDURE — 95806 SLEEP STUDY UNATT&RESP EFFT: CPT | Performed by: PSYCHIATRY & NEUROLOGY

## 2023-12-26 DIAGNOSIS — M79.2 NEUROPATHIC PAIN OF UPPER EXTREMITY: Primary | ICD-10-CM

## 2023-12-26 RX ORDER — PREGABALIN 75 MG/1
75 CAPSULE ORAL 2 TIMES DAILY
Qty: 60 CAPSULE | Refills: 2 | Status: SHIPPED | OUTPATIENT
Start: 2023-12-26 | End: 2024-04-03 | Stop reason: SDUPTHER

## 2024-01-03 PROCEDURE — RXMED WILLOW AMBULATORY MEDICATION CHARGE

## 2024-01-05 ENCOUNTER — PHARMACY VISIT (OUTPATIENT)
Dept: PHARMACY | Facility: CLINIC | Age: 69
End: 2024-01-05
Payer: COMMERCIAL

## 2024-01-10 PROCEDURE — RXMED WILLOW AMBULATORY MEDICATION CHARGE

## 2024-01-13 ENCOUNTER — PHARMACY VISIT (OUTPATIENT)
Dept: PHARMACY | Facility: CLINIC | Age: 69
End: 2024-01-13
Payer: COMMERCIAL

## 2024-01-16 DIAGNOSIS — G47.33 OSA (OBSTRUCTIVE SLEEP APNEA): Primary | ICD-10-CM

## 2024-01-16 NOTE — RESULT ENCOUNTER NOTE
Severe sleep apnea.  Was severe enough, that they recommend a titration study inpatient to also assess for the need of oxygen.  Order has been placed.  Should get done urgently.  In the meantime, should avoid any sedatives, or sleeping on his back.  Would consider sleeping upright with pillows propping his upper body.

## 2024-01-17 ENCOUNTER — TELEMEDICINE (OUTPATIENT)
Dept: PHARMACY | Facility: HOSPITAL | Age: 69
End: 2024-01-17
Payer: MEDICARE

## 2024-01-17 ENCOUNTER — HOSPITAL ENCOUNTER (OUTPATIENT)
Dept: RADIOLOGY | Facility: CLINIC | Age: 69
Discharge: HOME | End: 2024-01-17
Payer: MEDICARE

## 2024-01-17 ENCOUNTER — TELEPHONE (OUTPATIENT)
Dept: PRIMARY CARE | Facility: CLINIC | Age: 69
End: 2024-01-17

## 2024-01-17 ENCOUNTER — PHARMACY VISIT (OUTPATIENT)
Dept: PHARMACY | Facility: CLINIC | Age: 69
End: 2024-01-17
Payer: COMMERCIAL

## 2024-01-17 ENCOUNTER — OFFICE VISIT (OUTPATIENT)
Dept: PRIMARY CARE | Facility: CLINIC | Age: 69
End: 2024-01-17
Payer: MEDICARE

## 2024-01-17 VITALS
DIASTOLIC BLOOD PRESSURE: 84 MMHG | HEART RATE: 84 BPM | SYSTOLIC BLOOD PRESSURE: 144 MMHG | WEIGHT: 295 LBS | OXYGEN SATURATION: 96 % | BODY MASS INDEX: 39.1 KG/M2 | HEIGHT: 73 IN

## 2024-01-17 DIAGNOSIS — M25.551 RIGHT HIP PAIN: ICD-10-CM

## 2024-01-17 DIAGNOSIS — M25.551 RIGHT HIP PAIN: Primary | ICD-10-CM

## 2024-01-17 DIAGNOSIS — E11.9 TYPE 2 DIABETES MELLITUS WITHOUT COMPLICATION, WITHOUT LONG-TERM CURRENT USE OF INSULIN (MULTI): Primary | ICD-10-CM

## 2024-01-17 PROCEDURE — 1160F RVW MEDS BY RX/DR IN RCRD: CPT | Performed by: STUDENT IN AN ORGANIZED HEALTH CARE EDUCATION/TRAINING PROGRAM

## 2024-01-17 PROCEDURE — 1159F MED LIST DOCD IN RCRD: CPT | Performed by: STUDENT IN AN ORGANIZED HEALTH CARE EDUCATION/TRAINING PROGRAM

## 2024-01-17 PROCEDURE — 1125F AMNT PAIN NOTED PAIN PRSNT: CPT | Performed by: STUDENT IN AN ORGANIZED HEALTH CARE EDUCATION/TRAINING PROGRAM

## 2024-01-17 PROCEDURE — 73502 X-RAY EXAM HIP UNI 2-3 VIEWS: CPT | Mod: RIGHT SIDE | Performed by: RADIOLOGY

## 2024-01-17 PROCEDURE — 73502 X-RAY EXAM HIP UNI 2-3 VIEWS: CPT | Mod: RT

## 2024-01-17 PROCEDURE — RXMED WILLOW AMBULATORY MEDICATION CHARGE

## 2024-01-17 PROCEDURE — 3079F DIAST BP 80-89 MM HG: CPT | Performed by: STUDENT IN AN ORGANIZED HEALTH CARE EDUCATION/TRAINING PROGRAM

## 2024-01-17 PROCEDURE — G2211 COMPLEX E/M VISIT ADD ON: HCPCS | Performed by: STUDENT IN AN ORGANIZED HEALTH CARE EDUCATION/TRAINING PROGRAM

## 2024-01-17 PROCEDURE — 99214 OFFICE O/P EST MOD 30 MIN: CPT | Performed by: STUDENT IN AN ORGANIZED HEALTH CARE EDUCATION/TRAINING PROGRAM

## 2024-01-17 PROCEDURE — 3077F SYST BP >= 140 MM HG: CPT | Performed by: STUDENT IN AN ORGANIZED HEALTH CARE EDUCATION/TRAINING PROGRAM

## 2024-01-17 RX ORDER — KETOROLAC TROMETHAMINE 30 MG/ML
60 INJECTION, SOLUTION INTRAMUSCULAR; INTRAVENOUS ONCE
Status: SHIPPED | OUTPATIENT
Start: 2024-01-17 | End: 2024-01-22

## 2024-01-17 RX ORDER — METHYLPREDNISOLONE ACETATE 80 MG/ML
80 INJECTION, SUSPENSION INTRA-ARTICULAR; INTRALESIONAL; INTRAMUSCULAR; SOFT TISSUE ONCE
Status: SHIPPED | OUTPATIENT
Start: 2024-01-17

## 2024-01-17 ASSESSMENT — PATIENT HEALTH QUESTIONNAIRE - PHQ9
SUM OF ALL RESPONSES TO PHQ9 QUESTIONS 1 AND 2: 0
2. FEELING DOWN, DEPRESSED OR HOPELESS: NOT AT ALL
1. LITTLE INTEREST OR PLEASURE IN DOING THINGS: NOT AT ALL

## 2024-01-17 ASSESSMENT — ENCOUNTER SYMPTOMS
LOSS OF SENSATION IN FEET: 0
DEPRESSION: 0
OCCASIONAL FEELINGS OF UNSTEADINESS: 0

## 2024-01-17 ASSESSMENT — COLUMBIA-SUICIDE SEVERITY RATING SCALE - C-SSRS
1. IN THE PAST MONTH, HAVE YOU WISHED YOU WERE DEAD OR WISHED YOU COULD GO TO SLEEP AND NOT WAKE UP?: NO
2. HAVE YOU ACTUALLY HAD ANY THOUGHTS OF KILLING YOURSELF?: NO
6. HAVE YOU EVER DONE ANYTHING, STARTED TO DO ANYTHING, OR PREPARED TO DO ANYTHING TO END YOUR LIFE?: NO

## 2024-01-17 NOTE — PROGRESS NOTES
Pharmacist Clinic: Diabetes Management  Shawn Romero is a 68 y.o. male was referred to Clinical Pharmacy Team for diabetes management.     Referring Provider: Christopher D'Amico, DO     HISTORY OF PRESENT ILLNESS  Patient is an insulin dependent T2DM. Historically he was unable to afford meds due to cost. He is approved for the  PAP for ozempic and repatha.     LAB REVIEW   Glucose (mg/dL)   Date Value   2023 151 (H)   2023 131 (H)   10/07/2022 179 (H)     Hemoglobin A1C (%)   Date Value   2023 7.0 (A)   2023 8.4 (A)   10/07/2022 7.7 (A)     Bicarbonate (mmol/L)   Date Value   2023 36 (H)   2023 32   10/07/2022 31     Urea Nitrogen (mg/dL)   Date Value   2023 18   2023 18   10/07/2022 12     Creatinine (mg/dL)   Date Value   2023 0.92   2023 0.86   10/07/2022 0.89     Lab Results   Component Value Date    HGBA1C 7.0 (A) 2023    HGBA1C 8.4 (A) 2023    HGBA1C 7.7 (A) 10/07/2022     Lab Results   Component Value Date    CHOL 119 2023    CHOL 220 (H) 2023    CHOL 229 (H) 10/07/2022     Lab Results   Component Value Date    HDL 49.3 2023    HDL 43.8 2023    HDL 48.9 10/07/2022     Lab Results   Component Value Date    TRIG 189 (H) 2023    TRIG 187 (H) 2023    TRIG 195 (H) 10/07/2022     DIABETES ASSESSMENT    CURRENT PHARMACOTHERAPY  - metformin 1000 mg 1 by mouth twice daily   - lantus 100U/3ml 70 units nightly (patient reports he has significantly cut back, has been taking 20 units)   - jardiance 10 mg daily    - humalog 10-14 units with meals 2-3 times per day  - ozempic 2 mg under the skin once weekly (injects on )    SECONDARY PREVENTION  - Statin? No; patient cannot tolerate statins, on PCSK9i  - ACE-I/ARB? No    SMBG:   - 7 day AV    DISCUSSION  - Patient is tolerating his medications without issue  - Increase jardiance to 25 mg daily    RECOMMENDATIONS/PLAN  1. Patients diabetes is well  controlled with most recent A1c of 7.0 % (goal < 7 %).   - Continue all meds under the continuation of care with the referring provider and clinical pharmacy team.  - Increase jardiance to 25 mg once daily    Clinical Pharmacist follow up: 1 month   PAP Approval: 5/23/23    Thank you,  Janessa Caro, PharmD    Verbal consent to manage patient's drug therapy was obtained from the patient. They were informed they may decline to participate or withdraw from participation in pharmacy services at any time.

## 2024-01-18 NOTE — RESULT ENCOUNTER NOTE
Mild hip arthritis, possible calcific tendinosis of hamstring origin.  Hopefully treatment helps, follow-up with orthopedics.

## 2024-01-31 ENCOUNTER — OFFICE VISIT (OUTPATIENT)
Dept: ORTHOPEDIC SURGERY | Facility: CLINIC | Age: 69
End: 2024-01-31
Payer: MEDICARE

## 2024-01-31 DIAGNOSIS — M54.50 CHRONIC LOW BACK PAIN, UNSPECIFIED BACK PAIN LATERALITY, UNSPECIFIED WHETHER SCIATICA PRESENT: ICD-10-CM

## 2024-01-31 DIAGNOSIS — M16.11 PRIMARY LOCALIZED OSTEOARTHRITIS OF RIGHT HIP: Primary | ICD-10-CM

## 2024-01-31 DIAGNOSIS — M25.551 RIGHT HIP PAIN: ICD-10-CM

## 2024-01-31 DIAGNOSIS — G89.29 CHRONIC LOW BACK PAIN, UNSPECIFIED BACK PAIN LATERALITY, UNSPECIFIED WHETHER SCIATICA PRESENT: ICD-10-CM

## 2024-01-31 PROCEDURE — 1159F MED LIST DOCD IN RCRD: CPT | Performed by: FAMILY MEDICINE

## 2024-01-31 PROCEDURE — 1160F RVW MEDS BY RX/DR IN RCRD: CPT | Performed by: FAMILY MEDICINE

## 2024-01-31 PROCEDURE — 20611 DRAIN/INJ JOINT/BURSA W/US: CPT | Performed by: FAMILY MEDICINE

## 2024-01-31 PROCEDURE — 99214 OFFICE O/P EST MOD 30 MIN: CPT | Performed by: FAMILY MEDICINE

## 2024-01-31 PROCEDURE — 1125F AMNT PAIN NOTED PAIN PRSNT: CPT | Performed by: FAMILY MEDICINE

## 2024-01-31 RX ORDER — LIDOCAINE HYDROCHLORIDE 10 MG/ML
4 INJECTION INFILTRATION; PERINEURAL
Status: COMPLETED | OUTPATIENT
Start: 2024-01-31 | End: 2024-01-31

## 2024-01-31 RX ORDER — TRIAMCINOLONE ACETONIDE 40 MG/ML
80 INJECTION, SUSPENSION INTRA-ARTICULAR; INTRAMUSCULAR
Status: COMPLETED | OUTPATIENT
Start: 2024-01-31 | End: 2024-01-31

## 2024-01-31 RX ADMIN — TRIAMCINOLONE ACETONIDE 80 MG: 40 INJECTION, SUSPENSION INTRA-ARTICULAR; INTRAMUSCULAR at 13:25

## 2024-01-31 RX ADMIN — LIDOCAINE HYDROCHLORIDE 4 ML: 10 INJECTION INFILTRATION; PERINEURAL at 13:25

## 2024-01-31 ASSESSMENT — PAIN - FUNCTIONAL ASSESSMENT: PAIN_FUNCTIONAL_ASSESSMENT: 0-10

## 2024-01-31 ASSESSMENT — PAIN DESCRIPTION - DESCRIPTORS: DESCRIPTORS: NAGGING

## 2024-01-31 ASSESSMENT — PAIN SCALES - GENERAL: PAINLEVEL_OUTOF10: 8

## 2024-01-31 NOTE — PROGRESS NOTES
** Please excuse any errors in grammar or translation related to this dictation. Voice recognition software was utilized to prepare this document. **    Assessment & Plan:  Discussed with patient that his current pain potentially could be multifactorial.  He does have reported symptoms and localizing exam findings to suggest intra-articular source of his pain such as hip arthritis.  However he also endorses numbness radiating throughout his lower extremity which would not be due to an isolated hip issue.  He does have a chronic lumbar pain for which he is seeing pain management with the next appointment reported to be in March 2024.  To address the hip symptoms did offer completion of ultrasound-guided steroid injection today.  Explained to patient that the injections given intramuscularly by his PCP would have helped to mitigate some of her symptoms however being more precise with the injection is likely to benefit him for more sustained time.  He agreed to have this performed. After injection, he reports feeling immediate relief of the hip pain.  If following today's injection continues to have back pain as well as numbness into his leg, recommend following up with his pain management physician for further management.  All questions answered and patient agrees plan of care.      Chief complaint:  Right hip pain    HPI:  68-year-old male presents with acute right hip pain.  Symptoms started 5 to 6 weeks ago when he was cleaning his home for his daughter to visit over the holidays.  No injuries or falls reported.  Initially pain was diffuse surrounding the lateral hip, groin and lower back.  Pain is now most prominent in the anterior hip into the groin.  He also does endorse numbness throughout the right lower extremity that intermittently occurs.  He has a history of chronic lumbar issues which she is seen by pain management and neurology for.  He saw his PCP about 2 weeks ago and received intramuscular Toradol and  Depo-Medrol which helped reduce his pain for 1 to 2 days.  Unfortunately the pain did return shortly after that and have been continuous since.    Exam:  RIGHT Hip Exam:  Antalgic gait  No warmth, erythema or ecchymosis overlying.  Active flexion <90 degrees  NTTP over greater trochanter, glute tendons, proximal ITB, ischial tuberosity  [4]/5 strength of hip flexion, 5/5 abduction, & adduction  SILT  [ - ]Log roll pain, [ + ]FADIR pain, [ + ]LAMONT pain, [ + ]Stinchfield,  [ - ]Scour  [ + ] C-sign    Results:  X-rays right hip obtained 1/17/2024 reviewed and independent interpreted as moderate degenerative changes.  No acute fracture.  Normal alignment.    Procedure:  Patient ID: Shawn Romero is a 68 y.o. male.    L Inj/Asp: R hip joint on 1/31/2024 1:25 PM  Indications: pain  Details: 22 G needle, ultrasound-guided anterior approach  Medications: 80 mg triamcinolone acetonide 40 mg/mL; 4 mL lidocaine 10 mg/mL (1 %)  Outcome: tolerated well, no immediate complications  Procedure, treatment alternatives, risks and benefits explained, specific risks discussed. Consent was given by the patient. Immediately prior to procedure a time out was called to verify the correct patient, procedure, equipment, support staff and site/side marked as required. Patient was prepped and draped in the usual sterile fashion.

## 2024-02-01 DIAGNOSIS — E11.9 TYPE 2 DIABETES MELLITUS WITHOUT COMPLICATION, WITHOUT LONG-TERM CURRENT USE OF INSULIN (MULTI): ICD-10-CM

## 2024-02-01 PROCEDURE — RXMED WILLOW AMBULATORY MEDICATION CHARGE

## 2024-02-02 ENCOUNTER — PHARMACY VISIT (OUTPATIENT)
Dept: PHARMACY | Facility: CLINIC | Age: 69
End: 2024-02-02
Payer: COMMERCIAL

## 2024-02-02 RX ORDER — INSULIN GLARGINE 100 [IU]/ML
INJECTION, SOLUTION SUBCUTANEOUS
Qty: 60 ML | Refills: 1 | Status: SHIPPED | OUTPATIENT
Start: 2024-02-02 | End: 2025-02-01

## 2024-02-05 ENCOUNTER — PHARMACY VISIT (OUTPATIENT)
Dept: PHARMACY | Facility: CLINIC | Age: 69
End: 2024-02-05
Payer: COMMERCIAL

## 2024-02-06 DIAGNOSIS — E11.9 TYPE 2 DIABETES MELLITUS WITHOUT COMPLICATION, WITHOUT LONG-TERM CURRENT USE OF INSULIN (MULTI): ICD-10-CM

## 2024-02-07 ENCOUNTER — PHARMACY VISIT (OUTPATIENT)
Dept: PHARMACY | Facility: CLINIC | Age: 69
End: 2024-02-07
Payer: COMMERCIAL

## 2024-02-07 PROCEDURE — RXMED WILLOW AMBULATORY MEDICATION CHARGE

## 2024-02-08 RX ORDER — INSULIN LISPRO 100 [IU]/ML
12-14 INJECTION, SOLUTION SUBCUTANEOUS
Qty: 30 ML | Refills: 0 | Status: SHIPPED | OUTPATIENT
Start: 2024-02-08 | End: 2024-02-13

## 2024-02-13 ENCOUNTER — DOCUMENTATION (OUTPATIENT)
Dept: PHARMACY | Facility: HOSPITAL | Age: 69
End: 2024-02-13
Payer: MEDICARE

## 2024-02-13 ENCOUNTER — PHARMACY VISIT (OUTPATIENT)
Dept: PHARMACY | Facility: CLINIC | Age: 69
End: 2024-02-13
Payer: COMMERCIAL

## 2024-02-13 DIAGNOSIS — E11.9 TYPE 2 DIABETES MELLITUS WITHOUT COMPLICATION, WITHOUT LONG-TERM CURRENT USE OF INSULIN (MULTI): Primary | ICD-10-CM

## 2024-02-13 DIAGNOSIS — E11.69 TYPE 2 DIABETES MELLITUS WITH OTHER SPECIFIED COMPLICATION, UNSPECIFIED WHETHER LONG TERM INSULIN USE (MULTI): ICD-10-CM

## 2024-02-13 PROCEDURE — RXMED WILLOW AMBULATORY MEDICATION CHARGE

## 2024-02-13 RX ORDER — INSULIN LISPRO 100 [IU]/ML
10-14 INJECTION, SOLUTION INTRAVENOUS; SUBCUTANEOUS
Qty: 15 ML | Refills: 5 | Status: SHIPPED | OUTPATIENT
Start: 2024-02-13 | End: 2024-05-01 | Stop reason: SDUPTHER

## 2024-02-13 RX ORDER — BLOOD SUGAR DIAGNOSTIC
STRIP MISCELLANEOUS
Qty: 100 EACH | Refills: 11 | Status: SHIPPED | OUTPATIENT
Start: 2024-02-13 | End: 2024-02-15 | Stop reason: SDUPTHER

## 2024-02-14 ENCOUNTER — TELEMEDICINE (OUTPATIENT)
Dept: PHARMACY | Facility: HOSPITAL | Age: 69
End: 2024-02-14
Payer: MEDICARE

## 2024-02-14 DIAGNOSIS — E78.5 HYPERLIPIDEMIA LDL GOAL <100: ICD-10-CM

## 2024-02-14 DIAGNOSIS — E11.9 TYPE 2 DIABETES MELLITUS WITHOUT COMPLICATION, WITHOUT LONG-TERM CURRENT USE OF INSULIN (MULTI): Primary | ICD-10-CM

## 2024-02-14 DIAGNOSIS — E11.69 TYPE 2 DIABETES MELLITUS WITH OTHER SPECIFIED COMPLICATION, UNSPECIFIED WHETHER LONG TERM INSULIN USE (MULTI): ICD-10-CM

## 2024-02-14 PROCEDURE — RXMED WILLOW AMBULATORY MEDICATION CHARGE

## 2024-02-14 NOTE — PROGRESS NOTES
Pharmacist Clinic: Diabetes Management  Shawn Romero is a 68 y.o. male was referred to Clinical Pharmacy Team for diabetes management.     Referring Provider: Christopher D'Amico, DO     HISTORY OF PRESENT ILLNESS  Patient is an insulin dependent T2DM. Historically he was unable to afford meds due to cost. He is approved for the  PAP for ozempic and repatha.     LAB REVIEW   Glucose (mg/dL)   Date Value   2023 151 (H)   2023 131 (H)   10/07/2022 179 (H)     Hemoglobin A1C (%)   Date Value   2023 7.0 (A)   2023 8.4 (A)   10/07/2022 7.7 (A)     Bicarbonate (mmol/L)   Date Value   2023 36 (H)   2023 32   10/07/2022 31     Urea Nitrogen (mg/dL)   Date Value   2023 18   2023 18   10/07/2022 12     Creatinine (mg/dL)   Date Value   2023 0.92   2023 0.86   10/07/2022 0.89     Lab Results   Component Value Date    HGBA1C 7.0 (A) 2023    HGBA1C 8.4 (A) 2023    HGBA1C 7.7 (A) 10/07/2022     Lab Results   Component Value Date    CHOL 119 2023    CHOL 220 (H) 2023    CHOL 229 (H) 10/07/2022     Lab Results   Component Value Date    HDL 49.3 2023    HDL 43.8 2023    HDL 48.9 10/07/2022     Lab Results   Component Value Date    TRIG 189 (H) 2023    TRIG 187 (H) 2023    TRIG 195 (H) 10/07/2022     DIABETES ASSESSMENT    CURRENT PHARMACOTHERAPY  - metformin 1000 mg 1 by mouth twice daily   - lantus 100U/3ml 70 units nightly (patient reports he has significantly cut back, has been taking 20 units)   - jardiance 10 mg daily    - humalog 10-14 units with meals 2-3 times per day  - ozempic 2 mg under the skin once weekly (injects on )    SECONDARY PREVENTION  - Statin? No; patient cannot tolerate statins, on PCSK9i  - ACE-I/ARB? No    SMBG:   - 7 day AV    DISCUSSION  - Patient is tolerating his medications without issue  - PA will be submitted so patient can continue repatha given clinical  benefit    RECOMMENDATIONS/PLAN  1. Patients diabetes is well controlled with most recent A1c of 7.0 % (goal < 7 %).   - Continue all meds under the continuation of care with the referring provider and clinical pharmacy team.    Clinical Pharmacist follow up: 1 month   PAP Approval: 5/23/23    Thank you,  Janessa Caro, PharmD    Verbal consent to manage patient's drug therapy was obtained from the patient. They were informed they may decline to participate or withdraw from participation in pharmacy services at any time.

## 2024-02-15 RX ORDER — BLOOD SUGAR DIAGNOSTIC
STRIP MISCELLANEOUS
Qty: 300 EACH | Refills: 5 | Status: SHIPPED | OUTPATIENT
Start: 2024-02-15

## 2024-02-16 ENCOUNTER — PHARMACY VISIT (OUTPATIENT)
Dept: PHARMACY | Facility: CLINIC | Age: 69
End: 2024-02-16
Payer: COMMERCIAL

## 2024-02-16 PROCEDURE — RXMED WILLOW AMBULATORY MEDICATION CHARGE

## 2024-02-20 ENCOUNTER — CLINICAL SUPPORT (OUTPATIENT)
Dept: SLEEP MEDICINE | Facility: HOSPITAL | Age: 69
End: 2024-02-20
Payer: MEDICARE

## 2024-02-20 DIAGNOSIS — G47.33 OSA (OBSTRUCTIVE SLEEP APNEA): ICD-10-CM

## 2024-02-20 PROCEDURE — 95811 POLYSOM 6/>YRS CPAP 4/> PARM: CPT | Performed by: INTERNAL MEDICINE

## 2024-02-20 ASSESSMENT — SLEEP AND FATIGUE QUESTIONNAIRES: ESS TOTAL SCORE: 4

## 2024-02-21 VITALS — HEIGHT: 73 IN | OXYGEN SATURATION: 95 % | RESPIRATION RATE: 14 BRPM | BODY MASS INDEX: 39.1 KG/M2 | WEIGHT: 295 LBS

## 2024-02-21 ASSESSMENT — SLEEP AND FATIGUE QUESTIONNAIRES
SITTING IN A CLASSROOM AT SCHOOL DURING THE MORNING: 0
SITTING AND WATCHING TV OR A VIDEO: 1
ESS-CHAD TOTAL SCORE: 4
SITTING QUITELY BY YOURSELF AFTER LUNCH: 1
LYING DOWN TO REST OR NAP IN THE AFTERNOON: 1
SITTING AND RIDING IN A CAR OR BUS FOR ABOUT HALF AN HOUR: 0
SITTING AND READING: 1
SITTING AND EATING A MEAL: 0
SITTING AND TALKING TO SOMEONE: 0

## 2024-02-21 NOTE — PROGRESS NOTES
[facility] TECH NOTE:     Patient: Shawn Romero   MRN//AGE: 1955  1955  68 y.o.   Technologist: Karly Burger   Room: 2   Service Date: 2024        Sleep Testing Location: [facility]: 4    TECHNOLOGIST SLEEP STUDY PROCEDURE NOTE:   This sleep study is being conducted according to the policies and procedures outlined by the AAS accreditation standards.  The sleep study procedure and processes involved during this appointment was explained to the patient/patient’s family, questions were answered. The patient/family verbalized understanding.      The patient is a 68 y.o. year old male scheduled for a CPAP titration. He arrived for his appointment.      The study that was ultimately completed was a CPAP titration    The full study Was completed.  Patient questionnaires completed?: yes     Consents signed? yes    Initial Fall Risk Screening:     Shawn has not fallen in the last 6 months.  Shawn does not have a fear of falling. He does not need assistance with sitting, standing, or walking. He does not need assistance walking in his home. He does not need assistance in an unfamiliar setting. The patient is not using an assistive device.     Brief Study observations: 68 year old male presents for a CPAP titration. Patient was acclimated to CPAP for 15 minutes prior to hook-up this evening. The mask used was a F & P Vitera full face size large, using a pressure of 4 cmH2O. Patient seemed to tolerate the mask fit and pressure well during this time. CPAP was started at a pressure of 4 cmH2O and ending with a pressure of 10 cmH20.  Patient woke once during the night to use the restroom.  REM was observed.       Deviation to order/protocol and reason: none      If PAP, which was preferred mask/pressure/mode: F & P Vitera FF large     Other:None    After the procedure, the patient/family was informed to ensure followup with ordering clinician for testing results.      Technologist: Karly Burger

## 2024-03-04 PROCEDURE — RXMED WILLOW AMBULATORY MEDICATION CHARGE

## 2024-03-05 ENCOUNTER — TELEPHONE (OUTPATIENT)
Dept: PRIMARY CARE | Facility: CLINIC | Age: 69
End: 2024-03-05
Payer: MEDICARE

## 2024-03-05 DIAGNOSIS — G47.33 OSA (OBSTRUCTIVE SLEEP APNEA): Primary | ICD-10-CM

## 2024-03-05 NOTE — TELEPHONE ENCOUNTER
Result Communication    Resulted Orders   In-Center Sleep Study (Non-Sleep Provider Only)    Narrative    RECORDTYPE: CPAP  CPT: 86146 PAP PSG (>=6y)  CSN: [id number]  SCHRECDATE: 2024 20:26:35  LOCATION_CODE: [id number]  PROCDUR: 414.0 min    Sleep Medicine   at [place] [address]  [phone]    Positive Airway Pressure (PAP) Titration Report    Patient: RIMMA PERSON                      MRN: [id number]                 :   1955  Study Date: 2024                     Height: 73.0 in               Age:   68  Study Type: CPAP; 38736 PAP PSG (>=6y)    Weight: 295.0 lb  BMI: 38.9 Sex:   Male  Referring Clinician: CHRISTOPHER D'AMICO  Neck size: 0.0 in             ESS:     DIAGNOSIS: Obstructive Sleep Apnea, Adult (G47.33)    CMS: yes  CLINICAL SUMMARY   Indication: Patient referred for snoring, breathing problems during sleep,   excessive daytime sleepiness, unrefreshing sleep, unusual behaviors at night,   difficulty falling asleep, difficulty staying asleep, prolonged waking up at   night, early morning wake ups recently diagnosed with sleep apnea, fatigue,   tired during the day, trouble with irritability during the day, trouble   staying focused during the day, trouble remembering things during the day  Past Medical History: type 2 diabetes, seasonal allergies, acid reflux,   vitamin D deficiency, hypertension, hyperlipidemia, gastric reflux, ocular   myasthenia gravis, acute lower back pain  Medications: Repatha, Tamsulosin, Tadalafil, Pantoprazole, Prednisolone,   pyridostigmine  Past Sleep study: History of sleep disordered breathing according to previous   sleep study.    Repatha, Tamsulosin, Tadalafil, Pantoprazole, Prednisolone, pyridostigmine    TECHNICAL OBSERVATIONS / BEHAVIOR SUMMARY   68 year old male presents for a CPAP titration. Patient was acclimated to   CPAP for 15 minutes prior to hook-up this evening. The mask used was a F & P    Vitera full face size large, using a pressure of 4 cmH2O. Patient seemed to   tolerate the mask fit and pressure well during this time. CPAP was started at   a pressure of 4 cmH2O and ending with a pressure of 10 cmH20. Patient woke   once during the night to use the restroom. REM was observed.     EEG / SLEEP SUMMARY  The nocturnal sleep study demonstrated acceptable sleep onset and normal REM   sleep latency, total sleep time was 305.0 minutes, and the sleep efficiency   was 73.7%. The limited sleep EEG montage demonstrated appropriate waveforms   without epileptiform discharges.    PERIODIC LIMB MOVEMENT SUMMARY  No significant periodic limb movements of sleep were noted during this study.    The periodic limb movement index during sleep was 0.0/hr, with 0.0%   associated with arousals. The periodic limb movement arousal index during   sleep was 0.0/hr.    RESPIRATORY SUMMARY  The study was performed on  room air . Positive airway pressure (PAP) was   titrated as shown in the titration table (see table).   The optimal mask used during the study was the: Vitera large.     At the therapeutic PAP setting, there were 116.0 minutes of sleep during   which the residual AHI was 0.0 and the SpO2 agnes was 86.0%.     CO2 analysis: N/A      CARDIAC SUMMARY    69  68  normal sinus rhythm  IMPRESSION     History of sleep apnea based on previous sleep study.   Successful titration with CPAP at 10cm H2O. Sleep-disordered breathing,   sleep-related hypoxemia, and snoring were resolved.   RECOMMENDATIONS    Consider initiation of Auto-CPAP at the following settings: cm H2O with   heated humidification via a Vitera large. Follow clinical symptoms and PAP   outcome data with further adjustment of PAP settings as needed. Adherence to   PAP therapy should be emphasized and monitored closely.   FOLLOW-UP    With ordering clinician for further recommendations and management  The study raw data and document was personally reviewed  and electronically   signed by:   Dr. ALFONSO CHAVES,  on 3/5/2024 at 09:54 AM    TECHNICAL SUMMARY  This standard PAP titration polysomnogram was performed as per sleep center   protocol. The following channels were recorded: EEG (F3-M2, F4-M1, C3-M2,   C4-M1, O1-M2, O2-M1), EOG (LOC, SIRENA), chin EMG, thermistor airflow, nasal   pressure transducer airflow, PAP flow, thoracic and abdominal effort channels   by respiratory inductive plethysmography, ECG, limb EMG on bilateral anterior   tibialis, [upper limb leads on bilateral flexor digitorum superficialis,]   pulse oximetry, body position, microphone for snoring, and synchronized   audio-video analysis. Patient was continually attended and monitored by a   registered sleep technologist.  The study was reviewed in full to ensure the   accuracy and quality of the data. Scoring of hypopneas was based on current   AASM guidelines except in the case of CMS related guidelines. For AASM   guidelines, hypopneas were scored when there was 10-second decrement in the   flow limitation by 30% associated with either a 3% O2 desaturation or   arousal. For CMS, hypopneas were scored when there was 10-second decrement in   flow limitation by 30% associated with a 4% O2 desaturation. Respiratory   Effort Related Arousals (RERAs) were scored if there were decrements in flow   limitation not meeting hypopnea criteria and associated with arousal. The   Respiratory Distress Index (RDI) reflects the RERA index + AHI.       ENCOUNTER #: [id number] CSN #: [id number] SCHEDULE DATE: 2024 20:26:35   LOCATION CODE: [facility]            Positive Airway Pressure (PAP) Titration Data Report    PATIENT: RIMMA PERSON    [age]    STUDY DATE: 2024  MRN: [id number]           Sex: Male    Recording Tech: Karly Burger  : 1955           BMI: 38.9    Scoring Tech: Karly Burger    SLEEP ARCHITECTURE SUMMARY    Lights Out Time: 22:48 PM                     Lights on Time: 05:43  AM  Total Recording Time:      414.0 min (6.9 hr) Initial sleep latency:            25.0 min  Total Sleep Time:          305.0 min (5.1 hr) Initial REM latency:              78.0 min  Sleep Efficiency:          73.7%              Wake after sleep onset (WASO):    84.0 min    Sleep Stages     Time (minutes)     % Sleep Time  Wake             109.0              -  Stage N1         28.5               9.3  Stage N2         181.5              59.5  Stage N3         0.0                0.0  REM              95.0                31.1    AROUSAL SUMMARY    Arousal Type     NREM     REM      Sleep                   Count    Index    Count    Index    Count    Index  Total            12       3.4      2        1.3      14       2.8  Spontaneous      8        2.3      2        1.3      10       2.0  Respiratory      4        1.1      0        0.0      4        0.8  Limb Movement    0        0.0      0        0.0      0.0      0.0    LIMB MOVEMENT SUMMARY                           NREM   REM    Sleep  Wake                         Count  Index  Count  Index  Count  Index  Count  Index  Total LM               0      0.0    0      0.0    0      0.0    0      0.0  Total LM with arousal  0      0.0    0      0.0    0      0.0    0      0.0  PLMS                   0      0.0    0      0.0    0      0.0    0      0.0  PLM with arousal       0      0.0    0      0.0    0      0.0    0      0.0  % PLM with arousal     0.0    0.0    0.0    -      Heart rate (bpm)          Wake    NREM    REM    All Sleep      Mean Heart Rate       69      68      66     68      Minimum Heart Rate    59      55      52     52      Maximum Heart Rate    89      80      85     85    CARBON DIOXIDE / OXIMETRY MONITORING SUMMARY    SpO2 Summary                       Wake    NREM    REM  Mean %               94.0    92.0    90.0  Time < 90% (min)     15.3    32.8    34.3  Time <= 88% (min)    5.2     15.8    31.9  Time < 85% (min)     0.3     3.9     15.7  Time  < 80% (min)     0.3     1.1     8.2    EtCO2 Summary                     Wake    NREM    REM  Mean               0.0     0.0     0.0  High               0       0       0  Low                0       0       0  Time > 55 mmHg     0.0     0.0     0.0  Time > 50 mmHg     0.0     0.0     0.0  %Time > 50 mmHg    0.0     0.0     0.0    TcCO2 Summary                     Wake    NREM    REM  Mean               0.0     0.0     0.0  High               255     0       0  Low                0       0       0  Time > 55 mmHg     0.0     0.0     0.0  Time > 50 mmHg     0.0     0.0     0.0  %Time > 50 mmHg    0.0     0.0     0.0        PAP TITRATION TABLE - Sleep, Disordered Breathing Rates and Oxygen Statistics    Protocol IPAP  EPAP Backup  Rate O2  Volume Total  Sleep Sleep  Efficiency REM  Sleep REM  RDI Supine  Sleep  Supine  RDI SpO2  Mean SpO2  Adis SpO2  <89% RDI  CPAP     4.0        -            0.0        15.5         49%                 0.0        -        0.0           0.0          90         84          7            69.7  CPAP     5.0        -            0.0        18.0         80%                 0.0        -        0.0           0.0          90         85          4            46.7  CPAP     6.0        -            0.0        38.0         100%                0.0        -        0.0           0.0          89         72          8            17.4  CPAP     7.0        -            0.0        38.0         100%                20.5       20.5     0.0           0.0          86         69          22           12.6  CPAP     8.0        -            0.0        34.0         100%                0.0        -        0.0           0.0          93         91          0            0.0  CPAP     9.0        -            0.0        45.5         97%                 13.0       13.8     0.0           0.0          92         81          9            4.0  CPAP     10.0       -            0.0        116.0        60%                 61.5        0.0      0.0           0.0          95         86          3            0.0    PAP TITRATION TABLE - Disordered Breathing Events    Protocol IPAP  EPAP Backup  Rate O2  Volume Mask         Obstructive   Apnea Mixed  Apnea Central  Apnea Obstructive  Hypopnea Central  Hypopnea RERA RDI  CPAP     4.0        -            0.0        Vitera large 0                     0            0              18                    0                 0    69.7  CPAP     5.0        -            0.0        Vitera large 0                     0            0              14                    0                 0    46.7  CPAP     6.0        -            0.0        Vitera large 3                     0            1              7                     0                 0    17.4  CPAP     7.0        -            0.0        Vitera large 5                     0            0              3                     0                 0    12.6  CPAP     8.0        -            0.0        Vitera large 0                     0            0              0                     0                 0    0.0  CPAP     9.0        -            0.0        Vitera large 0                     0            0              3                     0                 0    4.0  CPAP     10.0       -            0.0        Vitera large 0                     0            0              0                     0                 0    0.0      HYPNOGRAM      ----------  Report Digitally Signed By:  ALFONSO CHAVES (3/5/2024 9:57:29 AM)       10:36 AM      Results were successfully communicated with the patient and they acknowledged their understanding.

## 2024-03-05 NOTE — TELEPHONE ENCOUNTER
----- Message from Christopher D'Amico, DO sent at 3/5/2024 10:00 AM EST -----  Sleep study positive, recommended AutoPap 5-20 cm H2O.  If amenable, I will send order to supply company.

## 2024-03-05 NOTE — RESULT ENCOUNTER NOTE
Sleep study positive, recommended AutoPap 5-20 cm H2O.  If amenable, I will send order to supply company.

## 2024-03-06 ENCOUNTER — PHARMACY VISIT (OUTPATIENT)
Dept: PHARMACY | Facility: CLINIC | Age: 69
End: 2024-03-06
Payer: COMMERCIAL

## 2024-03-06 ENCOUNTER — TELEMEDICINE (OUTPATIENT)
Dept: PHARMACY | Facility: HOSPITAL | Age: 69
End: 2024-03-06
Payer: MEDICARE

## 2024-03-06 DIAGNOSIS — E78.5 HYPERLIPIDEMIA LDL GOAL <100: ICD-10-CM

## 2024-03-06 DIAGNOSIS — E11.9 TYPE 2 DIABETES MELLITUS WITHOUT COMPLICATION, WITHOUT LONG-TERM CURRENT USE OF INSULIN (MULTI): ICD-10-CM

## 2024-03-06 PROCEDURE — RXMED WILLOW AMBULATORY MEDICATION CHARGE

## 2024-03-08 ENCOUNTER — LAB (OUTPATIENT)
Dept: LAB | Facility: LAB | Age: 69
End: 2024-03-08
Payer: MEDICARE

## 2024-03-08 DIAGNOSIS — I10 HYPERTENSION, UNSPECIFIED TYPE: ICD-10-CM

## 2024-03-08 DIAGNOSIS — E78.5 HYPERLIPIDEMIA, UNSPECIFIED HYPERLIPIDEMIA TYPE: ICD-10-CM

## 2024-03-08 DIAGNOSIS — E55.9 VITAMIN D DEFICIENCY: ICD-10-CM

## 2024-03-08 DIAGNOSIS — E11.9 TYPE 2 DIABETES MELLITUS WITHOUT COMPLICATION, WITH LONG-TERM CURRENT USE OF INSULIN (MULTI): ICD-10-CM

## 2024-03-08 DIAGNOSIS — R06.83 SNORING: ICD-10-CM

## 2024-03-08 DIAGNOSIS — G47.19 EXCESSIVE DAYTIME SLEEPINESS: ICD-10-CM

## 2024-03-08 DIAGNOSIS — Z79.4 TYPE 2 DIABETES MELLITUS WITHOUT COMPLICATION, WITH LONG-TERM CURRENT USE OF INSULIN (MULTI): ICD-10-CM

## 2024-03-08 DIAGNOSIS — N52.9 ERECTILE DYSFUNCTION, UNSPECIFIED ERECTILE DYSFUNCTION TYPE: ICD-10-CM

## 2024-03-08 DIAGNOSIS — N40.0 BENIGN PROSTATIC HYPERPLASIA, UNSPECIFIED WHETHER LOWER URINARY TRACT SYMPTOMS PRESENT: ICD-10-CM

## 2024-03-08 DIAGNOSIS — G70.00 OCULAR MYASTHENIA GRAVIS (MULTI): ICD-10-CM

## 2024-03-08 DIAGNOSIS — K21.9 GASTROESOPHAGEAL REFLUX DISEASE, UNSPECIFIED WHETHER ESOPHAGITIS PRESENT: ICD-10-CM

## 2024-03-08 LAB
25(OH)D3 SERPL-MCNC: 49 NG/ML (ref 30–100)
ALBUMIN SERPL BCP-MCNC: 4.2 G/DL (ref 3.4–5)
ALP SERPL-CCNC: 61 U/L (ref 33–136)
ALT SERPL W P-5'-P-CCNC: 24 U/L (ref 10–52)
ANION GAP SERPL CALC-SCNC: 9 MMOL/L (ref 10–20)
AST SERPL W P-5'-P-CCNC: 16 U/L (ref 9–39)
BILIRUB SERPL-MCNC: 0.6 MG/DL (ref 0–1.2)
BUN SERPL-MCNC: 19 MG/DL (ref 6–23)
CALCIUM SERPL-MCNC: 9.6 MG/DL (ref 8.6–10.6)
CHLORIDE SERPL-SCNC: 100 MMOL/L (ref 98–107)
CHOLEST SERPL-MCNC: 120 MG/DL (ref 0–199)
CHOLESTEROL/HDL RATIO: 2.2
CO2 SERPL-SCNC: 39 MMOL/L (ref 21–32)
CREAT SERPL-MCNC: 0.81 MG/DL (ref 0.5–1.3)
EGFRCR SERPLBLD CKD-EPI 2021: >90 ML/MIN/1.73M*2
ERYTHROCYTE [DISTWIDTH] IN BLOOD BY AUTOMATED COUNT: 15.2 % (ref 11.5–14.5)
EST. AVERAGE GLUCOSE BLD GHB EST-MCNC: 146 MG/DL
GLUCOSE SERPL-MCNC: 99 MG/DL (ref 74–99)
HBA1C MFR BLD: 6.7 %
HCT VFR BLD AUTO: 50.7 % (ref 41–52)
HDLC SERPL-MCNC: 54.2 MG/DL
HGB BLD-MCNC: 16.4 G/DL (ref 13.5–17.5)
LDLC SERPL CALC-MCNC: 36 MG/DL
MCH RBC QN AUTO: 28.8 PG (ref 26–34)
MCHC RBC AUTO-ENTMCNC: 32.3 G/DL (ref 32–36)
MCV RBC AUTO: 89 FL (ref 80–100)
NON HDL CHOLESTEROL: 66 MG/DL (ref 0–149)
NRBC BLD-RTO: 0 /100 WBCS (ref 0–0)
PLATELET # BLD AUTO: 230 X10*3/UL (ref 150–450)
POTASSIUM SERPL-SCNC: 4.3 MMOL/L (ref 3.5–5.3)
PROT SERPL-MCNC: 7 G/DL (ref 6.4–8.2)
RBC # BLD AUTO: 5.7 X10*6/UL (ref 4.5–5.9)
SODIUM SERPL-SCNC: 144 MMOL/L (ref 136–145)
TRIGL SERPL-MCNC: 148 MG/DL (ref 0–149)
TSH SERPL-ACNC: 1.31 MIU/L (ref 0.44–3.98)
VLDL: 30 MG/DL (ref 0–40)
WBC # BLD AUTO: 9.2 X10*3/UL (ref 4.4–11.3)

## 2024-03-08 PROCEDURE — 36415 COLL VENOUS BLD VENIPUNCTURE: CPT

## 2024-03-08 PROCEDURE — 84443 ASSAY THYROID STIM HORMONE: CPT

## 2024-03-08 PROCEDURE — 85027 COMPLETE CBC AUTOMATED: CPT

## 2024-03-08 PROCEDURE — 82306 VITAMIN D 25 HYDROXY: CPT

## 2024-03-08 PROCEDURE — 80053 COMPREHEN METABOLIC PANEL: CPT

## 2024-03-08 PROCEDURE — 80061 LIPID PANEL: CPT

## 2024-03-08 PROCEDURE — 83036 HEMOGLOBIN GLYCOSYLATED A1C: CPT

## 2024-03-10 NOTE — RESULT ENCOUNTER NOTE
Hemoglobin A1c at 6.7, at diabetic goal, continue current regimen with pharmacist.    Remaining labs mostly unremarkable.

## 2024-03-11 ENCOUNTER — TELEPHONE (OUTPATIENT)
Dept: PRIMARY CARE | Facility: CLINIC | Age: 69
End: 2024-03-11
Payer: MEDICARE

## 2024-03-11 NOTE — TELEPHONE ENCOUNTER
----- Message from Christopher D'Amico, DO sent at 3/10/2024  6:51 PM EDT -----  Hemoglobin A1c at 6.7, at diabetic goal, continue current regimen with pharmacist.    Remaining labs mostly unremarkable.

## 2024-03-11 NOTE — TELEPHONE ENCOUNTER
Result Communication    Resulted Orders   CBC   Result Value Ref Range    WBC 9.2 4.4 - 11.3 x10*3/uL    nRBC 0.0 0.0 - 0.0 /100 WBCs    RBC 5.70 4.50 - 5.90 x10*6/uL    Hemoglobin 16.4 13.5 - 17.5 g/dL    Hematocrit 50.7 41.0 - 52.0 %    MCV 89 80 - 100 fL    MCH 28.8 26.0 - 34.0 pg    MCHC 32.3 32.0 - 36.0 g/dL    RDW 15.2 (H) 11.5 - 14.5 %    Platelets 230 150 - 450 x10*3/uL   Comprehensive Metabolic Panel   Result Value Ref Range    Glucose 99 74 - 99 mg/dL    Sodium 144 136 - 145 mmol/L    Potassium 4.3 3.5 - 5.3 mmol/L    Chloride 100 98 - 107 mmol/L    Bicarbonate 39 (H) 21 - 32 mmol/L    Anion Gap 9 (L) 10 - 20 mmol/L    Urea Nitrogen 19 6 - 23 mg/dL    Creatinine 0.81 0.50 - 1.30 mg/dL    eGFR >90 >60 mL/min/1.73m*2      Comment:      Calculations of estimated GFR are performed using the 2021 CKD-EPI Study Refit equation without the race variable for the IDMS-Traceable creatinine methods.  https://jasn.asnjournals.org/content/early/2021/09/22/ASN.2956908655    Calcium 9.6 8.6 - 10.6 mg/dL    Albumin 4.2 3.4 - 5.0 g/dL    Alkaline Phosphatase 61 33 - 136 U/L    Total Protein 7.0 6.4 - 8.2 g/dL    AST 16 9 - 39 U/L    Bilirubin, Total 0.6 0.0 - 1.2 mg/dL    ALT 24 10 - 52 U/L      Comment:      Patients treated with Sulfasalazine may generate falsely decreased results for ALT.   Lipid Panel   Result Value Ref Range    Cholesterol 120 0 - 199 mg/dL      Comment:            Age      Desirable   Borderline High   High     0-19 Y     0 - 169       170 - 199     >/= 200    20-24 Y     0 - 189       190 - 224     >/= 225         >24 Y     0 - 199       200 - 239     >/= 240   **All ranges are based on fasting samples. Specific   therapeutic targets will vary based on patient-specific   cardiac risk.    Pediatric guidelines reference:Pediatrics 2011, 128(S5).Adult guidelines reference: NCEP ATPIII Guidelines,LOREN 2001, 258:2486-97    Venipuncture immediately after or during the administration of Metamizole may lead  to falsely low results. Testing should be performed immediately prior to Metamizole dosing.    HDL-Cholesterol 54.2 mg/dL      Comment:        Age       Very Low   Low     Normal    High    0-19 Y    < 35      < 40     40-45     ----  20-24 Y    ----     < 40      >45      ----        >24 Y      ----     < 40     40-60      >60      Cholesterol/HDL Ratio 2.2       Comment:        Ref Values  Desirable  < 3.4  High Risk  > 5.0    LDL Calculated 36 <=99 mg/dL      Comment:                                  Near   Borderline      AGE      Desirable  Optimal    High     High     Very High     0-19 Y     0 - 109     ---    110-129   >/= 130     ----    20-24 Y     0 - 119     ---    120-159   >/= 160     ----      >24 Y     0 -  99   100-129  130-159   160-189     >/=190      VLDL 30 0 - 40 mg/dL    Triglycerides 148 0 - 149 mg/dL      Comment:         Age         Desirable   Borderline High   High     Very High   0 D-90 D    19 - 174         ----         ----        ----  91 D- 9 Y     0 -  74        75 -  99     >/= 100      ----    10-19 Y     0 -  89        90 - 129     >/= 130      ----    20-24 Y     0 - 114       115 - 149     >/= 150      ----         >24 Y     0 - 149       150 - 199    200- 499    >/= 500    Venipuncture immediately after or during the administration of Metamizole may lead to falsely low results. Testing should be performed immediately prior to Metamizole dosing.    Non HDL Cholesterol 66 0 - 149 mg/dL      Comment:            Age       Desirable   Borderline High   High     Very High     0-19 Y     0 - 119       120 - 144     >/= 145    >/= 160    20-24 Y     0 - 149       150 - 189     >/= 190      ----         >24 Y    30 mg/dL above LDL Cholesterol goal     TSH with reflex to Free T4 if abnormal   Result Value Ref Range    Thyroid Stimulating Hormone 1.31 0.44 - 3.98 mIU/L    Narrative    TSH testing is performed using different testing methodology at [facility] than at other  [facility]. Direct result comparisons should only be made within the same method.     Vitamin D 25-Hydroxy,Total (for eval of Vitamin D levels)   Result Value Ref Range    Vitamin D, 25-Hydroxy, Total 49 30 - 100 ng/mL    Narrative    Deficiency:         < 20   ng/ml  Insufficiency:      20-29  ng/ml  Sufficiency:         ng/ml  This assay accurately quantifies the sum of Vitamin D3, 25-Hydroxy and Vitamin D2,25-Hydroxy.   Hemoglobin A1C   Result Value Ref Range    Hemoglobin A1C 6.7 (H) see below %    Estimated Average Glucose 146 Not Established mg/dL    Narrative    Diagnosis of Diabetes-Adults  Non-Diabetic: < or = 5.6%  Increased risk for developing diabetes: 5.7-6.4%  Diagnostic of diabetes: > or = 6.5%    Monitoring of Diabetes  Age (y)....................... Therapeutic Goal (%)  Adults: >18.........................<7.0  Pediatrics: 13-18...................<7.5  Pediatrics: 7-12....................<8.0  Pediatrics: 0-6..................... 7.5-8.5    American Diabetes Association. Diabetes Care 33(S1), Jan 2010           1:52 PM      Results were successfully communicated with the patient and they acknowledged their understanding.

## 2024-03-12 ENCOUNTER — APPOINTMENT (OUTPATIENT)
Dept: PRIMARY CARE | Facility: CLINIC | Age: 69
End: 2024-03-12
Payer: MEDICARE

## 2024-03-12 PROBLEM — I10 HYPERTENSION: Status: ACTIVE | Noted: 2024-03-12

## 2024-03-12 PROBLEM — Z79.85 LONG-TERM (CURRENT) USE OF INJECTABLE NON-INSULIN ANTIDIABETIC DRUGS: Status: ACTIVE | Noted: 2023-06-16

## 2024-03-12 PROBLEM — D12.9 BENIGN NEOPLASM OF RECTUM AND ANAL CANAL: Status: ACTIVE | Noted: 2023-11-20

## 2024-03-12 PROBLEM — R40.0 DAYTIME SOMNOLENCE: Status: ACTIVE | Noted: 2024-01-16

## 2024-03-12 PROBLEM — D12.8 BENIGN NEOPLASM OF RECTUM AND ANAL CANAL: Status: ACTIVE | Noted: 2023-11-20

## 2024-03-12 PROBLEM — J45.909 UNCOMPLICATED ASTHMA (HHS-HCC): Status: ACTIVE | Noted: 2023-06-27

## 2024-03-12 PROBLEM — Z86.010 HISTORY OF COLONIC POLYPS: Status: ACTIVE | Noted: 2023-11-20

## 2024-03-12 PROBLEM — D12.3 BENIGN NEOPLASM OF TRANSVERSE COLON: Status: ACTIVE | Noted: 2023-11-20

## 2024-03-12 PROBLEM — R06.83 SNORING: Status: ACTIVE | Noted: 2024-01-16

## 2024-03-12 PROBLEM — U09.9 POST-ACUTE COVID-19 SYNDROME: Status: ACTIVE | Noted: 2024-03-12

## 2024-03-12 PROBLEM — K57.30 DIVERTICULOSIS OF COLON: Status: ACTIVE | Noted: 2024-03-12

## 2024-03-13 PROCEDURE — RXMED WILLOW AMBULATORY MEDICATION CHARGE

## 2024-03-19 ENCOUNTER — PHARMACY VISIT (OUTPATIENT)
Dept: PHARMACY | Facility: CLINIC | Age: 69
End: 2024-03-19
Payer: COMMERCIAL

## 2024-03-26 ENCOUNTER — OFFICE VISIT (OUTPATIENT)
Dept: PRIMARY CARE | Facility: CLINIC | Age: 69
End: 2024-03-26
Payer: MEDICARE

## 2024-03-26 VITALS
OXYGEN SATURATION: 95 % | BODY MASS INDEX: 37.77 KG/M2 | SYSTOLIC BLOOD PRESSURE: 124 MMHG | HEIGHT: 73 IN | WEIGHT: 285 LBS | HEART RATE: 88 BPM | DIASTOLIC BLOOD PRESSURE: 74 MMHG

## 2024-03-26 DIAGNOSIS — M25.569 KNEE PAIN, UNSPECIFIED CHRONICITY, UNSPECIFIED LATERALITY: ICD-10-CM

## 2024-03-26 DIAGNOSIS — N52.9 ERECTILE DYSFUNCTION, UNSPECIFIED ERECTILE DYSFUNCTION TYPE: ICD-10-CM

## 2024-03-26 DIAGNOSIS — Z00.00 WELLNESS EXAMINATION: ICD-10-CM

## 2024-03-26 DIAGNOSIS — G47.33 OSA (OBSTRUCTIVE SLEEP APNEA): ICD-10-CM

## 2024-03-26 DIAGNOSIS — F17.210 CIGARETTE NICOTINE DEPENDENCE WITHOUT COMPLICATION: ICD-10-CM

## 2024-03-26 DIAGNOSIS — K21.9 GASTROESOPHAGEAL REFLUX DISEASE, UNSPECIFIED WHETHER ESOPHAGITIS PRESENT: ICD-10-CM

## 2024-03-26 DIAGNOSIS — E11.69 TYPE 2 DIABETES MELLITUS WITH OTHER SPECIFIED COMPLICATION, UNSPECIFIED WHETHER LONG TERM INSULIN USE (MULTI): ICD-10-CM

## 2024-03-26 DIAGNOSIS — E66.01 OBESITY, MORBID (MULTI): ICD-10-CM

## 2024-03-26 DIAGNOSIS — M54.50 CHRONIC LOW BACK PAIN, UNSPECIFIED BACK PAIN LATERALITY, UNSPECIFIED WHETHER SCIATICA PRESENT: ICD-10-CM

## 2024-03-26 DIAGNOSIS — G89.29 CHRONIC LOW BACK PAIN, UNSPECIFIED BACK PAIN LATERALITY, UNSPECIFIED WHETHER SCIATICA PRESENT: ICD-10-CM

## 2024-03-26 DIAGNOSIS — I10 HYPERTENSION, UNSPECIFIED TYPE: Primary | ICD-10-CM

## 2024-03-26 DIAGNOSIS — E78.5 HYPERLIPIDEMIA, UNSPECIFIED HYPERLIPIDEMIA TYPE: ICD-10-CM

## 2024-03-26 DIAGNOSIS — Z00.00 MEDICARE ANNUAL WELLNESS VISIT, INITIAL: ICD-10-CM

## 2024-03-26 DIAGNOSIS — G70.00 OCULAR MYASTHENIA GRAVIS (MULTI): ICD-10-CM

## 2024-03-26 DIAGNOSIS — M25.551 RIGHT HIP PAIN: ICD-10-CM

## 2024-03-26 DIAGNOSIS — M79.2 NEUROPATHIC PAIN OF UPPER EXTREMITY: ICD-10-CM

## 2024-03-26 DIAGNOSIS — N40.0 BENIGN PROSTATIC HYPERPLASIA, UNSPECIFIED WHETHER LOWER URINARY TRACT SYMPTOMS PRESENT: ICD-10-CM

## 2024-03-26 DIAGNOSIS — M54.50 ACUTE LOW BACK PAIN, UNSPECIFIED BACK PAIN LATERALITY, UNSPECIFIED WHETHER SCIATICA PRESENT: ICD-10-CM

## 2024-03-26 PROCEDURE — G0439 PPPS, SUBSEQ VISIT: HCPCS | Performed by: STUDENT IN AN ORGANIZED HEALTH CARE EDUCATION/TRAINING PROGRAM

## 2024-03-26 PROCEDURE — 3078F DIAST BP <80 MM HG: CPT | Performed by: STUDENT IN AN ORGANIZED HEALTH CARE EDUCATION/TRAINING PROGRAM

## 2024-03-26 PROCEDURE — 3044F HG A1C LEVEL LT 7.0%: CPT | Performed by: STUDENT IN AN ORGANIZED HEALTH CARE EDUCATION/TRAINING PROGRAM

## 2024-03-26 PROCEDURE — G0009 ADMIN PNEUMOCOCCAL VACCINE: HCPCS | Performed by: STUDENT IN AN ORGANIZED HEALTH CARE EDUCATION/TRAINING PROGRAM

## 2024-03-26 PROCEDURE — 3074F SYST BP LT 130 MM HG: CPT | Performed by: STUDENT IN AN ORGANIZED HEALTH CARE EDUCATION/TRAINING PROGRAM

## 2024-03-26 PROCEDURE — 1159F MED LIST DOCD IN RCRD: CPT | Performed by: STUDENT IN AN ORGANIZED HEALTH CARE EDUCATION/TRAINING PROGRAM

## 2024-03-26 PROCEDURE — 3048F LDL-C <100 MG/DL: CPT | Performed by: STUDENT IN AN ORGANIZED HEALTH CARE EDUCATION/TRAINING PROGRAM

## 2024-03-26 PROCEDURE — 1170F FXNL STATUS ASSESSED: CPT | Performed by: STUDENT IN AN ORGANIZED HEALTH CARE EDUCATION/TRAINING PROGRAM

## 2024-03-26 PROCEDURE — 99397 PER PM REEVAL EST PAT 65+ YR: CPT | Performed by: STUDENT IN AN ORGANIZED HEALTH CARE EDUCATION/TRAINING PROGRAM

## 2024-03-26 PROCEDURE — 90677 PCV20 VACCINE IM: CPT | Performed by: STUDENT IN AN ORGANIZED HEALTH CARE EDUCATION/TRAINING PROGRAM

## 2024-03-26 PROCEDURE — 1160F RVW MEDS BY RX/DR IN RCRD: CPT | Performed by: STUDENT IN AN ORGANIZED HEALTH CARE EDUCATION/TRAINING PROGRAM

## 2024-03-26 PROCEDURE — 99214 OFFICE O/P EST MOD 30 MIN: CPT | Performed by: STUDENT IN AN ORGANIZED HEALTH CARE EDUCATION/TRAINING PROGRAM

## 2024-03-26 RX ORDER — ORPHENADRINE CITRATE 100 MG/1
100 TABLET, EXTENDED RELEASE ORAL DAILY
Qty: 90 TABLET | Refills: 1 | Status: SHIPPED | OUTPATIENT
Start: 2024-03-26 | End: 2024-05-28 | Stop reason: SDUPTHER

## 2024-03-26 RX ORDER — MELOXICAM 15 MG/1
15 TABLET ORAL DAILY
Qty: 30 TABLET | Refills: 3 | Status: SHIPPED | OUTPATIENT
Start: 2024-03-26

## 2024-03-26 RX ORDER — TRAMADOL HYDROCHLORIDE 50 MG/1
50 TABLET ORAL 3 TIMES DAILY
Qty: 90 TABLET | Refills: 0 | Status: SHIPPED | OUTPATIENT
Start: 2024-03-26 | End: 2024-05-28 | Stop reason: SDUPTHER

## 2024-03-26 RX ORDER — PREGABALIN 75 MG/1
75 CAPSULE ORAL 2 TIMES DAILY
Qty: 60 CAPSULE | Refills: 2 | Status: CANCELLED | OUTPATIENT
Start: 2024-03-26 | End: 2024-06-24

## 2024-03-26 RX ORDER — TAMSULOSIN HYDROCHLORIDE 0.4 MG/1
0.8 CAPSULE ORAL DAILY
Qty: 180 CAPSULE | Refills: 1 | Status: SHIPPED | OUTPATIENT
Start: 2024-03-26 | End: 2024-05-28 | Stop reason: SDUPTHER

## 2024-03-26 RX ORDER — SUCRALFATE 1 G/1
1 TABLET ORAL DAILY PRN
Qty: 90 TABLET | Refills: 1 | Status: SHIPPED | OUTPATIENT
Start: 2024-03-26 | End: 2024-09-22

## 2024-03-26 ASSESSMENT — ACTIVITIES OF DAILY LIVING (ADL)
BATHING: INDEPENDENT
MANAGING_FINANCES: INDEPENDENT
DOING_HOUSEWORK: INDEPENDENT
GROCERY_SHOPPING: INDEPENDENT
TAKING_MEDICATION: INDEPENDENT
DRESSING: INDEPENDENT

## 2024-03-26 ASSESSMENT — ENCOUNTER SYMPTOMS
LOSS OF SENSATION IN FEET: 0
OCCASIONAL FEELINGS OF UNSTEADINESS: 0
DEPRESSION: 0

## 2024-03-26 ASSESSMENT — PATIENT HEALTH QUESTIONNAIRE - PHQ9
1. LITTLE INTEREST OR PLEASURE IN DOING THINGS: NOT AT ALL
2. FEELING DOWN, DEPRESSED OR HOPELESS: NOT AT ALL
SUM OF ALL RESPONSES TO PHQ9 QUESTIONS 1 AND 2: 0

## 2024-03-26 NOTE — PROGRESS NOTES
"Subjective   Reason for Visit: Shawn Romero is an 68 y.o. male here for a Medicare Wellness visit.               HPI    Patient Care Team:  Christopher D'Amico, DO as PCP - General (Family Medicine)  Mukesh Martinez DO as PCP - [employer] PCP     Review of Systems    Objective   Vitals:  Ht 1.854 m (6' 1\")   BMI 38.92 kg/m²       Physical Exam    Assessment/Plan   Problem List Items Addressed This Visit    None           HPI: 68-year-old male presenting for follow-up on chronic conditions, Medicare annual wellness exam/CPE.    DMII  Stable, doing well on current regimen. Continues to follow with [facility] pharmacist.     HTN  Currently not taking any medications, asymptomatic     HLD  Taking Repatha, tolerating well.     Chronic low back pain, chronic knee pain  Stable, tolerates current regimen well     ED  Stable     GERD  Stable     Ocular MG  Stable, following with neuro.     DARIA  PAP therapy pending    SocHx:   - Smoking: About 25 pack years, quit 12 years ago    12 point ROS reviewed and negative other than as stated in HPI      General: Alert, oriented, pleasant, in no acute distress  HEENT:      Head: normocephalic, atraumatic;      eyes: EOMI, no scleral icterus;   Neck: soft, supple, non-tender, no masses appreciated  CV: Heart with regular rate and rhythm, normal S1/S2, no murmurs  Lungs: CTAB without wheezing, rhonchi or rales; good respiratory effort, no increased work of breathing  Abdomen: soft, non-tender, non-distended, no masses appreciated, significantly limited by body habitus  Extremities: Trace edema, no cyanosis  Neuro: Cranial nerves grossly intact; alert and oriented, normal gait  Psych: Appropriate mood and affect    #HM  -Labs done before appointment and reviewed  -Vaccines:       Flu: UTD      Shingrix: UTD      Pneumococcal: Prevnar 20 IO      Tdap: Recommended to go to pharmacy  -Lung cancer screening with low-dose CT: quit 12 years ago, about 25 pack years  -Colonoscopy:  " 2023, 5 year plan  -AAA screening: negative 2023    #DMII  -Last A1C: 6.7 date: 03/2024  -Last Albumin/Creat ration: WNL Date: 10/21/21  -Diabetic foot exam: Date:  -Diabetic eye exam: Ophthalmology referral given the past office visit  -Current Medications: Lantus 50 units at bedtime, Humalog 12 units with meals, metformin 1000 mg twice daily, Jardiance 10 mg daily, Ozempic 2 mg weekly  -Statin: None, reports allergy to all statins  -ASA: None  -ACE/ARB: None  -Nutritionist: Referral pending  -Medication changes: None, continue to follow with [facility] pharmacist     #HTN  - At goal in office  -Currently unmedicated, advised to monitor at home     #HLD  -Continue Repatha, doing very well  -Repeat lipid at goal     #BPH  -Continue tamsulosin 0.4 mg 2 tablets daily, following with urology     #Chronic low back pain #chronic L knee pain  -CSA and UDS UTD  -Continue tramadol 50 mg 3 times daily as needed  -Continue Norflex as needed     #ED  -Continue tadalafil 20 mg as needed     #GERD  -Continue pantoprazole 40 mg daily     #Ocular MG  -Continue to follow with neurology  -Current medications include prednisone 4 mg daily, pyridostigmine 60 mg 3 times daily     #DARIA  - PAP therapy pending    F/U 3 months    Chris D'Amico, DO

## 2024-03-30 DIAGNOSIS — E11.9 TYPE 2 DIABETES MELLITUS WITHOUT COMPLICATION, WITHOUT LONG-TERM CURRENT USE OF INSULIN (MULTI): ICD-10-CM

## 2024-04-01 PROCEDURE — RXMED WILLOW AMBULATORY MEDICATION CHARGE

## 2024-04-01 RX ORDER — SEMAGLUTIDE 2.68 MG/ML
2 INJECTION, SOLUTION SUBCUTANEOUS
Qty: 3 ML | Refills: 0 | Status: SHIPPED | OUTPATIENT
Start: 2024-04-01 | End: 2024-04-24 | Stop reason: SDUPTHER

## 2024-04-02 ENCOUNTER — HOSPITAL ENCOUNTER (OUTPATIENT)
Dept: RADIOLOGY | Facility: HOSPITAL | Age: 69
Discharge: HOME | End: 2024-04-02
Payer: MEDICARE

## 2024-04-02 DIAGNOSIS — F17.210 CIGARETTE NICOTINE DEPENDENCE WITHOUT COMPLICATION: ICD-10-CM

## 2024-04-02 PROCEDURE — 71271 CT THORAX LUNG CANCER SCR C-: CPT

## 2024-04-03 ENCOUNTER — TELEPHONE (OUTPATIENT)
Dept: PRIMARY CARE | Facility: CLINIC | Age: 69
End: 2024-04-03

## 2024-04-03 ENCOUNTER — LAB (OUTPATIENT)
Dept: LAB | Facility: LAB | Age: 69
End: 2024-04-03
Payer: MEDICARE

## 2024-04-03 ENCOUNTER — TELEMEDICINE (OUTPATIENT)
Dept: PHARMACY | Facility: HOSPITAL | Age: 69
End: 2024-04-03
Payer: MEDICARE

## 2024-04-03 ENCOUNTER — PHARMACY VISIT (OUTPATIENT)
Dept: PHARMACY | Facility: CLINIC | Age: 69
End: 2024-04-03
Payer: COMMERCIAL

## 2024-04-03 ENCOUNTER — OFFICE VISIT (OUTPATIENT)
Dept: NEUROLOGY | Facility: CLINIC | Age: 69
End: 2024-04-03
Payer: MEDICARE

## 2024-04-03 VITALS
HEIGHT: 73 IN | SYSTOLIC BLOOD PRESSURE: 145 MMHG | HEART RATE: 67 BPM | BODY MASS INDEX: 38.3 KG/M2 | WEIGHT: 289 LBS | DIASTOLIC BLOOD PRESSURE: 80 MMHG

## 2024-04-03 DIAGNOSIS — E78.5 HYPERLIPIDEMIA LDL GOAL <100: ICD-10-CM

## 2024-04-03 DIAGNOSIS — G70.00 OCULAR MYASTHENIA GRAVIS (MULTI): Primary | ICD-10-CM

## 2024-04-03 DIAGNOSIS — M79.2 NEUROPATHIC PAIN OF UPPER EXTREMITY: ICD-10-CM

## 2024-04-03 DIAGNOSIS — E11.9 TYPE 2 DIABETES MELLITUS WITHOUT COMPLICATION, WITHOUT LONG-TERM CURRENT USE OF INSULIN (MULTI): Primary | ICD-10-CM

## 2024-04-03 LAB
AMPHETAMINES UR QL SCN: NORMAL
BARBITURATES UR QL SCN: NORMAL
BENZODIAZ UR QL SCN: NORMAL
BZE UR QL SCN: NORMAL
CANNABINOIDS UR QL SCN: NORMAL
FENTANYL+NORFENTANYL UR QL SCN: NORMAL
METHADONE UR QL SCN: NORMAL
OPIATES UR QL SCN: NORMAL
OXYCODONE+OXYMORPHONE UR QL SCN: NORMAL
PCP UR QL SCN: NORMAL

## 2024-04-03 PROCEDURE — 3048F LDL-C <100 MG/DL: CPT | Performed by: PSYCHIATRY & NEUROLOGY

## 2024-04-03 PROCEDURE — 80307 DRUG TEST PRSMV CHEM ANLYZR: CPT

## 2024-04-03 PROCEDURE — 1159F MED LIST DOCD IN RCRD: CPT | Performed by: PSYCHIATRY & NEUROLOGY

## 2024-04-03 PROCEDURE — 3077F SYST BP >= 140 MM HG: CPT | Performed by: PSYCHIATRY & NEUROLOGY

## 2024-04-03 PROCEDURE — 1160F RVW MEDS BY RX/DR IN RCRD: CPT | Performed by: PSYCHIATRY & NEUROLOGY

## 2024-04-03 PROCEDURE — 3079F DIAST BP 80-89 MM HG: CPT | Performed by: PSYCHIATRY & NEUROLOGY

## 2024-04-03 PROCEDURE — 99214 OFFICE O/P EST MOD 30 MIN: CPT | Performed by: PSYCHIATRY & NEUROLOGY

## 2024-04-03 PROCEDURE — 3044F HG A1C LEVEL LT 7.0%: CPT | Performed by: PSYCHIATRY & NEUROLOGY

## 2024-04-03 RX ORDER — PREGABALIN 75 MG/1
75 CAPSULE ORAL 2 TIMES DAILY
Qty: 60 CAPSULE | Refills: 2 | Status: SHIPPED | OUTPATIENT
Start: 2024-04-03 | End: 2024-07-02

## 2024-04-03 NOTE — TELEPHONE ENCOUNTER
----- Message from Christopher D'Amico, DO sent at 4/3/2024  8:58 AM EDT -----  CT lung cancer screening shows stable imaging, annual screenings

## 2024-04-03 NOTE — PATIENT INSTRUCTIONS
Your myasthenia gravis appears stable.  We discussed continuing the present regimen of prednisone 4 mg daily (since you experience significant worsening of double vision at lower doses) and Mestinon 90 mg 3 times daily.    We discussed that while on prednisone you should continue taking daily vitamin D to help maintain bone density.    We reviewed the Controlled Substance Agreement for Lyrica.  I am sending you for the required annual urine drug screen which is part of the controlled substance agreement.    I sent a refill for Lyrica to your pharmacy.  I did not change the dose.    Please see me in the office in 6 months.

## 2024-04-03 NOTE — PROGRESS NOTES
Pharmacist Clinic: Diabetes Management  Shawn Romero is a 68 y.o. male was referred to Clinical Pharmacy Team for diabetes management.     Referring Provider: Christopher D'Amico, DO     HISTORY OF PRESENT ILLNESS  Patient is an insulin dependent T2DM. Historically he was unable to afford meds due to cost. He is approved for the  PAP for ozempic and repatha.     LAB REVIEW   Glucose (mg/dL)   Date Value   2024 99   2023 151 (H)   2023 131 (H)   10/07/2022 179 (H)     Hemoglobin A1C (%)   Date Value   2024 6.7 (H)   2023 7.0 (A)   2023 8.4 (A)   10/07/2022 7.7 (A)     Bicarbonate (mmol/L)   Date Value   2024 39 (H)   2023 36 (H)   2023 32   10/07/2022 31     Urea Nitrogen (mg/dL)   Date Value   2024 19   2023 18   2023 18   10/07/2022 12     Creatinine (mg/dL)   Date Value   2024 0.81   2023 0.92   2023 0.86   10/07/2022 0.89     Lab Results   Component Value Date    HGBA1C 6.7 (H) 2024    HGBA1C 7.0 (A) 2023    HGBA1C 8.4 (A) 2023     Lab Results   Component Value Date    CHOL 120 2024    CHOL 119 2023    CHOL 220 (H) 2023     Lab Results   Component Value Date    HDL 54.2 2024    HDL 49.3 2023    HDL 43.8 2023     Lab Results   Component Value Date    TRIG 148 2024    TRIG 189 (H) 2023    TRIG 187 (H) 2023     DIABETES ASSESSMENT    CURRENT PHARMACOTHERAPY  - metformin 1000 mg 1 by mouth twice daily   - lantus 100U/3ml 50 units nightly   - jardiance 10 mg daily    - humalog 10-14 units with meals 2-3 times per day  - ozempic 2 mg under the skin once weekly (injects on )    SECONDARY PREVENTION  - Statin? No; patient cannot tolerate statins, on PCSK9i  - ACE-I/ARB? No    SMBG:   - 7 day AV    DISCUSSION  - Patient is tolerating his medications without issue  - He denies any side effects, discussed A1c is improved at 6.7%  - Patient reports  he has been eating a lot healthier     RECOMMENDATIONS/PLAN  1. Patients diabetes is well controlled with most recent A1c of 6.7 % (goal < 7 %).   - Continue all meds under the continuation of care with the referring provider and clinical pharmacy team.    Clinical Pharmacist follow up: 1 month   PAP Approval: 5/23/23    Thank you,  Janessa Caro, PharmD    Verbal consent to manage patient's drug therapy was obtained from the patient. They were informed they may decline to participate or withdraw from participation in pharmacy services at any time.

## 2024-04-03 NOTE — TELEPHONE ENCOUNTER
Result Communication    Resulted Orders   CT lung screening low dose    Narrative    Interpreted By:  Duncan Turner,   STUDY:  CT LUNG SCREENING LOW DOSE; 4/2/2024 1:13 pm      INDICATION:  Smoker screening      COMPARISON:  11/02/2017      ACCESSION NUMBER(S):  [id number]      ORDERING CLINICIAN:  CHRISTOPHER D'AMICO      TECHNIQUE:  Helical data acquisition of the chest was obtained without IV  contrast material.  Images were reformatted in axial, coronal, and  sagittal planes.      FINDINGS:  LUNGS AND AIRWAYS:  Mild upper lobe predominant emphysema and generalized airway  thickening. No dense consolidation, mass or central airway  obstruction. Stable benign less than 5 mm nodule in the right upper  lobe on series 7, image 114. No new or enlarging nodule.      MEDIASTINUM AND KALLIE, LOWER NECK AND AXILLA:  No significantly enlarged intrathoracic lymph nodes. Nondilated  esophagus. No masses are identified in the lower neck. Mediastinal  lipomatosis again noted in the setting of underlying obesity.      HEART AND VESSELS:  Heart size within normal limits. No pericardial effusion. Normal  caliber thoracic aorta and pulmonary trunk. Mild atherosclerotic  calcifications      UPPER ABDOMEN:  No acute upper abdominal finding. Persistent hepatic steatosis      CHEST WALL AND OSSEOUS STRUCTURES:  No significant soft tissue findings. No aggressive osseous finding on  CT. Degenerative changes of the spine        Impression    1. Stable small benign nodular density in the right upper lobe in  this patient with mild underlying smoking related lung disease.  Advise ongoing annual low-dose lung cancer screening CT          LUNG RADS CATEGORY:  Lung Rad: Lung-RADS 1 (Negative)      Recommendation: Continue annual screening with Low Dose Chest CT in  12 months, recommended as per American College of Radiology  Guidelines Lung-RADS Version 2022.                  MACRO:  None      Signed by: Duncan uTrner 4/2/2024 10:09  PM  Dictation workstation:   [id number]       10:49 AM      Results were successfully communicated with the patient and they acknowledged their understanding.

## 2024-04-03 NOTE — PROGRESS NOTES
Subjective     Shawn Romero is a 68 y.o. year old male seen in follow-up for ocular myasthenia gravis and left upper extremity neuropathic pain.    HPI    68-year-old  man with past medical history significant for type II diabetes, hyperlipidemia, obesity, GERD, osteoarthritis particularly including chronic left knee pain status post multiple orthopedic surgeries, remote former smoking.     I evaluated him initially on 3/14/2023.  See detailed in my ambulatory EMR note from that date he presented for ongoing management of ocular myasthenia and left upper extremity neuropathic pain status post ulnar neuropathy at the elbow surgery.  He was previously managed by Dr. Dee and transferred neurologic care to me because of Dr. Dee's [facility].     He has history of myasthenia gravis dating to 2015 and has been evaluated by multiple neuro-ophthalmologist.  He has not had convincing symptoms of generalization and has been maintained on prednisone 4 mg daily (with worsening of diplopia at lower levels [place] (and pyridostigmine 90 mg 3 times daily.     His left upper extremity neuropathic pain has responded to brand-name Lyrica but did not respond to gabapentin or generic pregabalin.     Because of requirement for Lyrica I had him sign a controlled substance agreement and take the required urine drug screen test.    I evaluated him subsequently and most recently on 10/25/2023.  As detailed in my note from that date he indicated stability of myasthenia gravis and I continued his regiment of prednisone and Mestinon as above.  He indicated control of left upper extremity neuropathic pain on brand-name Lyrica 75 mg twice daily, which I continued.    He is evaluated again today in the office.    He reports stability from the standpoint of myasthenia gravis.  He continues on prednisone 4 mg daily and Mestinon 90 mg 3 times daily and reports tolerating his medications well.  He is taking vitamin D for bone  "density.    Even on the current regimen he does note diplopia with prolonged iWork, resolving with resting his eyes.  Diplopia was considerably worse at lower doses of prednisone.  He has noted no occlusive ptosis.  He notes a modest degree of ptosis in either eye when he is very tired.    He denies neck weakness or head drop.  He denies any significant limb weakness.    He denies any significant dysphagia for liquids or solids.  He does indicate an esophageal issue whereby food can get \"stuck\" and that has been chronic.    He denies dyspnea.    He feels that Lyrica 75 mg twice daily is adequately controlling the neuropathic pain in his left upper extremity.  He is due to sign the CSA today and due for the annual urine drug screen.  He is tolerating Lyrica well without noted side effects.    He has pain in other regions including the right hip for which he recently received an injection, which provided good relief until he irritated the hip again while squatting at a grocery store.  He additionally notes low back pain.    He reports improvement of hemoglobin A1c to 6.7% on Ozempic.  He indicates losing 27 pounds initially after starting on Ozempic but gaining much of it back.  He has a continuous glucose monitor on the right arm.    Review of Systems    As per the history of present illness    Patient Active Problem List   Diagnosis    Obesity, morbid (CMS/[facility])    Type 2 diabetes mellitus with other specified complication, unspecified whether long term insulin use (CMS/[facility])    BPH (benign prostatic hyperplasia)    Hyperlipidemia LDL goal <100    Erectile dysfunction    Elevated PSA    GERD (gastroesophageal reflux disease)    Achilles tendinitis of left lower extremity    Acquired short Achilles tendon of left lower extremity    Attention and concentration deficit    Blurred vision, bilateral    Chronic back pain    Bone spur of foot    Borderline glaucoma with ocular hypertension    Chondromalacia of knee, right    " Convergence insufficiency    Cubital tunnel syndrome on left    Dermatitis, eczematoid    Myasthenia gravis (CMS/HCC)    Age-related nuclear cataract of right eye    Senile nuclear sclerosis    Sesamoiditis    Vitamin B12 deficiency    Vitamin D deficiency    Weight gain    Age-related nuclear cataract of left eye    Refractive error    Astigmatism    Ptosis of right eyelid    Pronation of both feet    Primary localized osteoarthrosis of left lower leg    Plantar fasciitis, left    Paralytic strabismus, sixth or abducens nerve palsy    Nicotine dependence in remission    Myalgia due to statin    Internal derangement of left knee    Insomnia    Inguinal bulge    Skin lesion    Hernia of anterior abdominal wall    Fatigue    Diplopia    Benign neoplasm of rectum and anal canal    Uncomplicated asthma    Snoring    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    Long-term (current) use of injectable non-insulin antidiabetic drugs    Hypertension    History of colonic polyps    Diverticulosis of colon    Daytime somnolence    Benign neoplasm of transverse colon     Past Medical History:   Diagnosis Date    Diverticulosis of large intestine without perforation or abscess without bleeding 06/15/2015    Diverticulosis of colon    Other conditions influencing health status 04/21/2014    Myalgia and myositis    Other muscle spasm 06/15/2015    Muscle spasm    Personal history of nicotine dependence 07/06/2016    History of nicotine dependence    Personal history of other specified conditions 04/21/2014    History of chest pain    Personal history of other specified conditions 04/21/2014    History of abdominal pain     Past Surgical History:   Procedure Laterality Date    HERNIA REPAIR  09/05/2013    Hernia Repair     Social History     Tobacco Use    Smoking status: Never    Smokeless tobacco: Not on file   Substance Use Topics    Alcohol use: Not on file     family history includes Arthritis in an other family member; Bell's palsy in his  brother; Cancer in his father; Diabetes in his father; Heart disease in his father; Kidney disease in his father.    Current Outpatient Medications:     alpha lipoic acid 600 mg capsule, Take 1 capsule by mouth once daily., Disp: , Rfl:     ascorbic acid (Vitamin C) 1,000 mg tablet, Take 1 tablet (1,000 mg) by mouth once daily., Disp: , Rfl:     cholecalciferol (Vitamin D-3) 25 MCG (1000 UT) tablet, Take 1 tablet (25 mcg) by mouth once daily., Disp: , Rfl:     cyanocobalamin, vitamin B-12, 3,000 mcg capsule, Take by mouth., Disp: , Rfl:     empagliflozin (Jardiance) 25 mg, Take 1 tablet (25 mg) by mouth once daily., Disp: 90 tablet, Rfl: 1    evolocumab (Repatha SureClick) 140 mg/mL injection, INJECT 1 ML (140 MG) UNDER THE SKIN EVERY 14 (FOURTEEN) DAYS., Disp: 2 mL, Rfl: 11    flash glucose sensor kit kit, USE AS INSTRUCTED TO CHECK BLOOD GLUCOSE, Disp: 2 each, Rfl: 11    FreeStyle Kassie reader (FreeStyle Kassie 2 [place]) misc, Use as instructed to check BG., Disp: 1 each, Rfl: 0    glucosamine-D3-Boswellia serr (Osteo Bi-Flex, 5-Loxin,) 1,500-400-100 mg-unit-mg tablet, Take by mouth., Disp: , Rfl:     insulin lispro (HumaLOG) 100 unit/mL injection, Inject 10-14 Units under the skin 3 times a day with meals. Take as directed per insulin instructions., Disp: 15 mL, Rfl: 5    Lantus Solostar U-100 Insulin 100 unit/mL (3 mL) pen, INJECT 70 UNITS UNDER THE SKIN EVERY NIGHT AT BEDTIME. TAKE AS DIRECTED PER INSULIN INSTRUCTIONS., Disp: 60 mL, Rfl: 1    loratadine (Claritin) 10 mg tablet, Take 1 tablet (10 mg) by mouth once daily at bedtime., Disp: 90 tablet, Rfl: 3    Lyrica 75 mg capsule, Take 1 capsule (75 mg) by mouth 2 times a day., Disp: 60 capsule, Rfl: 2    meloxicam (Mobic) 15 mg tablet, Take 1 tablet (15 mg) by mouth once daily. Take with food., Disp: 30 tablet, Rfl: 3    metFORMIN (Glucophage) 1,000 mg tablet, Take 1 tablet (1,000 mg) by mouth 2 times a day with meals., Disp: 180 tablet, Rfl: 1    multivitamin  "tablet, Take 1 tablet by mouth once daily., Disp: , Rfl:     OneTouch Ultra Test strip, USE 1 STRIP 3 TIMES DAILY., Disp: , Rfl:     orphenadrine (Norflex) 100 mg 12 hr tablet, Take 1 tablet (100 mg) by mouth once daily., Disp: 90 tablet, Rfl: 1    pantoprazole (ProtoNix) 40 mg EC tablet, Take 1 tablet (40 mg) by mouth once daily., Disp: 90 tablet, Rfl: 1    pen needle, diabetic 32 gauge x 1/4\" needle, Use as directed to inject insulin 4 times per day, Disp: 300 each, Rfl: 5    predniSONE (Deltasone) 1 mg tablet, Take 4 tablets (4 mg) by mouth once daily., Disp: 120 tablet, Rfl: 6    pyridostigmine (Mestinon) 60 mg tablet, Take 1.5 tablets (90 mg) by mouth 3 times a day., Disp: , Rfl:     semaglutide (Ozempic) 2 mg/dose (8 mg/3 mL) pen injector, Inject 2 mg under the skin 1 (one) time per week., Disp: 3 mL, Rfl: 0    sucralfate (Carafate) 1 gram tablet, Take 1 tablet (1 g) by mouth once daily as needed (Stomach aches)., Disp: 90 tablet, Rfl: 1    tamsulosin (Flomax) 0.4 mg 24 hr capsule, Take 2 capsules (0.8 mg) by mouth once daily., Disp: 180 capsule, Rfl: 1    traMADol (Ultram) 50 mg tablet, Take 1 tablet (50 mg) by mouth 3 times a day., Disp: 90 tablet, Rfl: 0    Current Facility-Administered Medications:     methylPREDNISolone acetate (DEPO-Medrol) injection 80 mg, 80 mg, intramuscular, Once, Charlieer D'Amico, DO    methylPREDNISolone acetate (DEPO-Medrol) injection 80 mg, 80 mg, intramuscular, Once, Dillan LOCK'Amico, DO  Allergies   Allergen Reactions    Hydrocodone-Acetaminophen Anxiety, Dizziness, Palpitations and Shortness of breath    Oxycodone-Acetaminophen Anxiety, Itching, Palpitations, Rash and Shortness of breath    Cholestyramine Bleeding    Shellfish Derived Unknown    Sulfa (Sulfonamide Antibiotics) Bleeding    Codeine Anxiety, Hives, Itching, Rash and Swelling    Iodine Anxiety, Diarrhea, Itching, Rash and Swelling    Penicillins Anxiety, Palpitations, Rash and Swelling    Statins-Hmg-Coa " Reductase Inhibitors Rash    Sulfamethoxazole-Trimethoprim Itching, Rash and Swelling       Objective   Neurological Exam  Physical Exam    Physical Examination:    General: Alert man who was ambulatory without assistive devices.  Speaking comfortably in complete sentences without dyspnea or staccato.    Cranial Nerves: Extraocular movements were intact and conjugate without nystagmus.  No ptosis.  Tight eyelid closure burying the lashes bilaterally.  Symmetrically intact facial motor function.  Grossly intact hearing.  No dysarthria or dysphonia.    Motor:  Confrontation strength was 5/5 at neck flexors and neck extensors and symmetrically 5/5 throughout the upper and lower extremities.     Station: Intact and stable.    Gait: Stable and remarkable only for a mildly antalgic appearance referable to the right hip.      Assessment/Plan     He appears stable from the standpoint of myasthenia gravis and I did not recommend a change to his regimen of prednisone and pyridostigmine today.    I reminded him to continue on a daily vitamin D supplement for bone density given his chronic prednisone requirement.    I did not change his Lyrica dose today.  It is working well for left upper extremity neuropathic pain.  I reviewed with him the controlled substance agreement form again as he is due for renewal and he signed it today.  I reminded him that he needs to get the annual urine drug screen done as well.    He required a refill for Lyrica and I sent this to his retail pharmacy after reviewing PDMP.    I advised him to follow-up in the office in 6 months.

## 2024-04-09 PROCEDURE — RXMED WILLOW AMBULATORY MEDICATION CHARGE

## 2024-04-11 ENCOUNTER — PHARMACY VISIT (OUTPATIENT)
Dept: PHARMACY | Facility: CLINIC | Age: 69
End: 2024-04-11
Payer: COMMERCIAL

## 2024-04-18 PROCEDURE — RXMED WILLOW AMBULATORY MEDICATION CHARGE

## 2024-04-22 ENCOUNTER — PHARMACY VISIT (OUTPATIENT)
Dept: PHARMACY | Facility: CLINIC | Age: 69
End: 2024-04-22
Payer: COMMERCIAL

## 2024-04-24 DIAGNOSIS — E11.9 TYPE 2 DIABETES MELLITUS WITHOUT COMPLICATION, WITHOUT LONG-TERM CURRENT USE OF INSULIN (MULTI): ICD-10-CM

## 2024-04-24 PROCEDURE — RXMED WILLOW AMBULATORY MEDICATION CHARGE

## 2024-04-24 RX ORDER — SEMAGLUTIDE 2.68 MG/ML
2 INJECTION, SOLUTION SUBCUTANEOUS
Qty: 3 ML | Refills: 0 | Status: SHIPPED | OUTPATIENT
Start: 2024-04-28 | End: 2024-05-01 | Stop reason: SDUPTHER

## 2024-04-25 ENCOUNTER — PHARMACY VISIT (OUTPATIENT)
Dept: PHARMACY | Facility: CLINIC | Age: 69
End: 2024-04-25
Payer: COMMERCIAL

## 2024-04-29 ENCOUNTER — PHARMACY VISIT (OUTPATIENT)
Dept: PHARMACY | Facility: CLINIC | Age: 69
End: 2024-04-29

## 2024-04-30 PROCEDURE — RXMED WILLOW AMBULATORY MEDICATION CHARGE

## 2024-05-01 ENCOUNTER — HOSPITAL ENCOUNTER (OUTPATIENT)
Dept: RADIOLOGY | Facility: EXTERNAL LOCATION | Age: 69
Discharge: HOME | End: 2024-05-01

## 2024-05-01 ENCOUNTER — OFFICE VISIT (OUTPATIENT)
Dept: ORTHOPEDIC SURGERY | Facility: CLINIC | Age: 69
End: 2024-05-01
Payer: MEDICARE

## 2024-05-01 ENCOUNTER — TELEMEDICINE (OUTPATIENT)
Dept: PHARMACY | Facility: HOSPITAL | Age: 69
End: 2024-05-01
Payer: MEDICARE

## 2024-05-01 ENCOUNTER — PHARMACY VISIT (OUTPATIENT)
Dept: PHARMACY | Facility: CLINIC | Age: 69
End: 2024-05-01
Payer: COMMERCIAL

## 2024-05-01 DIAGNOSIS — M25.551 RIGHT HIP PAIN: ICD-10-CM

## 2024-05-01 DIAGNOSIS — M16.11 PRIMARY LOCALIZED OSTEOARTHRITIS OF RIGHT HIP: Primary | ICD-10-CM

## 2024-05-01 DIAGNOSIS — E78.5 HYPERLIPIDEMIA LDL GOAL <100: ICD-10-CM

## 2024-05-01 DIAGNOSIS — M17.12 LOCALIZED OSTEOARTHRITIS OF LEFT KNEE: ICD-10-CM

## 2024-05-01 DIAGNOSIS — E11.9 TYPE 2 DIABETES MELLITUS WITHOUT COMPLICATION, WITHOUT LONG-TERM CURRENT USE OF INSULIN (MULTI): ICD-10-CM

## 2024-05-01 PROCEDURE — 3048F LDL-C <100 MG/DL: CPT | Performed by: FAMILY MEDICINE

## 2024-05-01 PROCEDURE — 3044F HG A1C LEVEL LT 7.0%: CPT | Performed by: FAMILY MEDICINE

## 2024-05-01 PROCEDURE — 20611 DRAIN/INJ JOINT/BURSA W/US: CPT | Performed by: FAMILY MEDICINE

## 2024-05-01 PROCEDURE — 1159F MED LIST DOCD IN RCRD: CPT | Performed by: FAMILY MEDICINE

## 2024-05-01 PROCEDURE — 99214 OFFICE O/P EST MOD 30 MIN: CPT | Performed by: FAMILY MEDICINE

## 2024-05-01 PROCEDURE — 1160F RVW MEDS BY RX/DR IN RCRD: CPT | Performed by: FAMILY MEDICINE

## 2024-05-01 RX ORDER — LIDOCAINE HYDROCHLORIDE 10 MG/ML
4 INJECTION INFILTRATION; PERINEURAL
Status: COMPLETED | OUTPATIENT
Start: 2024-05-01 | End: 2024-05-01

## 2024-05-01 RX ORDER — INSULIN LISPRO 100 [IU]/ML
10-14 INJECTION, SOLUTION INTRAVENOUS; SUBCUTANEOUS
Qty: 15 ML | Refills: 10 | Status: SHIPPED | OUTPATIENT
Start: 2024-05-01

## 2024-05-01 RX ORDER — SEMAGLUTIDE 2.68 MG/ML
2 INJECTION, SOLUTION SUBCUTANEOUS
Qty: 9 ML | Refills: 3 | Status: SHIPPED | OUTPATIENT
Start: 2024-05-05

## 2024-05-01 RX ORDER — TRIAMCINOLONE ACETONIDE 40 MG/ML
80 INJECTION, SUSPENSION INTRA-ARTICULAR; INTRAMUSCULAR
Status: COMPLETED | OUTPATIENT
Start: 2024-05-01 | End: 2024-05-01

## 2024-05-01 RX ADMIN — LIDOCAINE HYDROCHLORIDE 4 ML: 10 INJECTION INFILTRATION; PERINEURAL at 14:48

## 2024-05-01 RX ADMIN — TRIAMCINOLONE ACETONIDE 80 MG: 40 INJECTION, SUSPENSION INTRA-ARTICULAR; INTRAMUSCULAR at 14:48

## 2024-05-01 NOTE — PROGRESS NOTES
Pharmacist Clinic: Diabetes Management  Shawn Romero is a 68 y.o. male was referred to Clinical Pharmacy Team for diabetes management.     Referring Provider: Christopher D'Amico, DO     HISTORY OF PRESENT ILLNESS  Patient is an insulin dependent T2DM. Historically he was unable to afford meds due to cost. He is approved for the  PAP for ozempic and repatha.     LAB REVIEW   Glucose (mg/dL)   Date Value   2024 99   2023 151 (H)   2023 131 (H)   10/07/2022 179 (H)     Hemoglobin A1C (%)   Date Value   2024 6.7 (H)   2023 7.0 (A)   2023 8.4 (A)   10/07/2022 7.7 (A)     Bicarbonate (mmol/L)   Date Value   2024 39 (H)   2023 36 (H)   2023 32   10/07/2022 31     Urea Nitrogen (mg/dL)   Date Value   2024 19   2023 18   2023 18   10/07/2022 12     Creatinine (mg/dL)   Date Value   2024 0.81   2023 0.92   2023 0.86   10/07/2022 0.89     Lab Results   Component Value Date    HGBA1C 6.7 (H) 2024    HGBA1C 7.0 (A) 2023    HGBA1C 8.4 (A) 2023     Lab Results   Component Value Date    CHOL 120 2024    CHOL 119 2023    CHOL 220 (H) 2023     Lab Results   Component Value Date    HDL 54.2 2024    HDL 49.3 2023    HDL 43.8 2023     Lab Results   Component Value Date    TRIG 148 2024    TRIG 189 (H) 2023    TRIG 187 (H) 2023     DIABETES ASSESSMENT    CURRENT PHARMACOTHERAPY  - metformin 1000 mg 1 by mouth twice daily   - lantus 100U/3ml 55 units nightly   - jardiance 10 mg daily    - humalog 10-14 units with meals 2-3 times per day  - ozempic 2 mg under the skin once weekly (injects on )    SECONDARY PREVENTION  - Statin? No; patient cannot tolerate statins, on PCSK9i  - ACE-I/ARB? No    SMBG:   - 7 day AV    DISCUSSION  - Patient is tolerating his medications without issue  - He denies any side effects, discussed A1c is improved at 6.7%  - Patient reports  he has been eating a lot healthier     RECOMMENDATIONS/PLAN  1. Patients diabetes is well controlled with most recent A1c of 6.7 % (goal < 7 %).   - Continue all meds under the continuation of care with the referring provider and clinical pharmacy team.    Clinical Pharmacist follow up: 1 month   PAP Approval: 4/16/2024    Thank you,  Janessa Caro, PharmD    Verbal consent to manage patient's drug therapy was obtained from the patient. They were informed they may decline to participate or withdraw from participation in pharmacy services at any time.

## 2024-05-01 NOTE — PROGRESS NOTES
** Please excuse any errors in grammar or translation related to this dictation. Voice recognition software was utilized to prepare this document. **    Assessment & Plan:  Patient here for exacerbation of right hip arthritis.  While he did get excellent temporary relief following injection in January, the effect of this only lasted for about 2 weeks.  Patient was given option to repeat steroid injection today in hopes that would provide more relief than last time and he wanted to proceed with having this done.  Did discuss with patient that if the injection also provides limited relief, should consider meeting with one of our orthopedic surgeons to discuss hip replacement surgery.    In regards to his left knee pain, he has focal tenderness overlying his medial joint line.  He has a known history of left knee osteoarthritis and likely is dealing with exacerbation of symptoms.  Unable to complete steroid injection today in addition to his hip injection.  Advised to use OTC analgesics for the time being.  He will be scheduled accordingly for follow-up for further assessment.     All questions answered and patient agrees to this plan.       Chief complaint:  Right hip pain    HPI:  5/1/24: Patient following up for right hip arthritis.  Reports pain resolved for 2 weeks following last injection however quickly returned.  During this 2 weeks he reports it was the best his hip had felt in some time.  Pain has been increasing over the past 1 to 2 weeks after using lawnmower in his yard.  No new injury reported.  He additionally reports increasing pain in his left knee.  He has a history of left knee osteoarthritis which she was previously seeing Dr. Johnson for and had steroid injection completed in January.    1/31/24: 68-year-old male presents with acute right hip pain.  Symptoms started 5 to 6 weeks ago when he was cleaning his home for his daughter to visit over the holidays.  No injuries or falls reported.  Initially  pain was diffuse surrounding the lateral hip, groin and lower back.  Pain is now most prominent in the anterior hip into the groin.  He also does endorse numbness throughout the right lower extremity that intermittently occurs.  He has a history of chronic lumbar issues which she is seen by pain management and neurology for.  He saw his PCP about 2 weeks ago and received intramuscular Toradol and Depo-Medrol which helped reduce his pain for 1 to 2 days.  Unfortunately the pain did return shortly after that and have been continuous since.    Exam:  RIGHT Hip Exam:  Antalgic gait  No warmth, erythema or ecchymosis overlying.  Active flexion <90 degrees  NTTP over greater trochanter, glute tendons, proximal ITB, ischial tuberosity  [4]/5 strength of hip flexion, 5/5 abduction, & adduction  SILT  [ - ]Log roll pain, [ + ]FADIR pain, [ + ]LAMONT pain, [ + ]Stinchfield,  [ - ]Scour    Left knee has small effusion. No erythema.  TTP at medial joint line.       Results:  X-rays right hip obtained 1/17/2024 reviewed and independent interpreted as moderate degenerative changes.  No acute fracture.  Normal alignment.    3/8/24 HbA1c 6.7%    Procedure:  Patient ID: Shawn Romero is a 68 y.o. male.    L Inj/Asp: R hip joint on 5/1/2024 2:48 PM  Indications: pain  Details: 22 G needle, ultrasound-guided anterior approach  Medications: 80 mg triamcinolone acetonide 40 mg/mL; 4 mL lidocaine 10 mg/mL (1 %)  Outcome: tolerated well, no immediate complications    Procedure risk factors to include increased pain, bleeding, infection, neurovascular injury, soft tissue injury, transient elevation of blood glucose and blood pressure, and adverse reaction to medication were discussed with the patient. Patient understands there is a moderate risk of morbidity from undergoing the procedure.    Procedure, treatment alternatives, risks and benefits explained, specific risks discussed. Consent was given by the patient. Immediately prior to  procedure a time out was called to verify the correct patient, procedure, equipment, support staff and site/side marked as required. Patient was prepped and draped in the usual sterile fashion.

## 2024-05-05 DIAGNOSIS — G70.00 MYASTHENIA GRAVIS WITHOUT (ACUTE) EXACERBATION (MULTI): ICD-10-CM

## 2024-05-06 DIAGNOSIS — G70.00 MYASTHENIA GRAVIS WITHOUT (ACUTE) EXACERBATION (MULTI): ICD-10-CM

## 2024-05-06 RX ORDER — PYRIDOSTIGMINE BROMIDE 60 MG/1
TABLET ORAL
Qty: 135 TABLET | Refills: 5 | Status: SHIPPED | OUTPATIENT
Start: 2024-05-06

## 2024-05-06 RX ORDER — PYRIDOSTIGMINE BROMIDE 60 MG/1
TABLET ORAL
Qty: 135 TABLET | Refills: 5 | Status: SHIPPED | OUTPATIENT
Start: 2024-05-06 | End: 2024-05-06 | Stop reason: SDUPTHER

## 2024-05-09 PROCEDURE — RXMED WILLOW AMBULATORY MEDICATION CHARGE

## 2024-05-10 ENCOUNTER — PHARMACY VISIT (OUTPATIENT)
Dept: PHARMACY | Facility: CLINIC | Age: 69
End: 2024-05-10
Payer: COMMERCIAL

## 2024-05-13 ENCOUNTER — PHARMACY VISIT (OUTPATIENT)
Dept: PHARMACY | Facility: CLINIC | Age: 69
End: 2024-05-13
Payer: COMMERCIAL

## 2024-05-13 DIAGNOSIS — E11.69 TYPE 2 DIABETES MELLITUS WITH OTHER SPECIFIED COMPLICATION, UNSPECIFIED WHETHER LONG TERM INSULIN USE (MULTI): ICD-10-CM

## 2024-05-13 RX ORDER — INSULIN PUMP SYRINGE, 3 ML
1 EACH MISCELLANEOUS AS NEEDED
COMMUNITY

## 2024-05-14 RX ORDER — BLOOD SUGAR DIAGNOSTIC
STRIP MISCELLANEOUS
Qty: 100 STRIP | Refills: 3 | Status: SHIPPED | OUTPATIENT
Start: 2024-05-14

## 2024-05-15 PROCEDURE — RXMED WILLOW AMBULATORY MEDICATION CHARGE

## 2024-05-16 ENCOUNTER — PHARMACY VISIT (OUTPATIENT)
Dept: PHARMACY | Facility: CLINIC | Age: 69
End: 2024-05-16
Payer: COMMERCIAL

## 2024-05-22 ENCOUNTER — OFFICE VISIT (OUTPATIENT)
Dept: ORTHOPEDIC SURGERY | Facility: CLINIC | Age: 69
End: 2024-05-22
Payer: MEDICARE

## 2024-05-22 ENCOUNTER — HOSPITAL ENCOUNTER (OUTPATIENT)
Dept: RADIOLOGY | Facility: CLINIC | Age: 69
Discharge: HOME | End: 2024-05-22
Payer: MEDICARE

## 2024-05-22 DIAGNOSIS — M17.12 LOCALIZED OSTEOARTHRITIS OF LEFT KNEE: ICD-10-CM

## 2024-05-22 DIAGNOSIS — M17.12 LOCALIZED OSTEOARTHRITIS OF LEFT KNEE: Primary | ICD-10-CM

## 2024-05-22 DIAGNOSIS — M16.11 PRIMARY LOCALIZED OSTEOARTHRITIS OF RIGHT HIP: ICD-10-CM

## 2024-05-22 PROCEDURE — 1159F MED LIST DOCD IN RCRD: CPT | Performed by: FAMILY MEDICINE

## 2024-05-22 PROCEDURE — 1160F RVW MEDS BY RX/DR IN RCRD: CPT | Performed by: FAMILY MEDICINE

## 2024-05-22 PROCEDURE — 99214 OFFICE O/P EST MOD 30 MIN: CPT | Performed by: FAMILY MEDICINE

## 2024-05-22 PROCEDURE — 73564 X-RAY EXAM KNEE 4 OR MORE: CPT | Mod: LEFT SIDE | Performed by: STUDENT IN AN ORGANIZED HEALTH CARE EDUCATION/TRAINING PROGRAM

## 2024-05-22 PROCEDURE — RXMED WILLOW AMBULATORY MEDICATION CHARGE

## 2024-05-22 PROCEDURE — 3044F HG A1C LEVEL LT 7.0%: CPT | Performed by: FAMILY MEDICINE

## 2024-05-22 PROCEDURE — 73564 X-RAY EXAM KNEE 4 OR MORE: CPT | Mod: LT

## 2024-05-22 PROCEDURE — 3048F LDL-C <100 MG/DL: CPT | Performed by: FAMILY MEDICINE

## 2024-05-22 NOTE — PROGRESS NOTES
** Please excuse any errors in grammar or translation related to this dictation. Voice recognition software was utilized to prepare this document. **    Assessment & Plan:  Clinical presentation is most consistent with left knee arthritis.  Discuss with patient the nature of this disease and how it can be progressive.  Discuss initial non-operative treatment options as below.    - Maintaining a healthy weight: Every pound of bodyweight is about 4-5 pounds through the lower extremity. Additionally to be consider for joint replacement surgery in the future, BMI needs to be <40%.   - Activity modifications as needed.  - Prescription and otc analgesics to include but not limited to APAP, ibuprofen, naproxen, diclofenac gel.  - Steroid injection.  At this time, patient defers need for any specific interventions. He will continue to monitor and follow-up as needed.    Unfortunately as his hip CSI was ineffective for the second time in a row, do not believe repeating in future would be beneficial. He was given name of joint replacement surgeon to schedule consult for hip arthroplasty.    All questions answered and patient agrees to plan of care.    Chief complaint:  Left knee pain    HPI:  5/22/24: Patient today with left knee pain.  He previously was seeing Dr. Johnson for this pain and had aspiration and steroid injection completed.  This was last done in January 2023.  Reports exacerbation his symptoms a few weeks ago after performing lawn work however pain has gradually subsided.  It is now back to a more tolerable baseline level of pain.  Pain is localized to the medial aspect of his knee.  He occasionally wears knee brace for support.     Does report that right hip injection was not helpful. Previous injection helped a few weeks but no relief this time.     5/1/24: Patient following up for right hip arthritis.  Reports pain resolved for 2 weeks following last injection however quickly returned.  During this 2 weeks he  reports it was the best his hip had felt in some time.  Pain has been increasing over the past 1 to 2 weeks after using lawnmower in his yard.  No new injury reported.  He additionally reports increasing pain in his left knee.  He has a history of left knee osteoarthritis which she was previously seeing Dr. Johnson for and had steroid injection completed in January.    1/31/24: 68-year-old male presents with acute right hip pain.  Symptoms started 5 to 6 weeks ago when he was cleaning his home for his daughter to visit over the holidays.  No injuries or falls reported.  Initially pain was diffuse surrounding the lateral hip, groin and lower back.  Pain is now most prominent in the anterior hip into the groin.  He also does endorse numbness throughout the right lower extremity that intermittently occurs.  He has a history of chronic lumbar issues which she is seen by pain management and neurology for.  He saw his PCP about 2 weeks ago and received intramuscular Toradol and Depo-Medrol which helped reduce his pain for 1 to 2 days.  Unfortunately the pain did return shortly after that and have been continuous since.    Exam:  LEFT Knee examined. No effusion, ecchymosis, or erythema.  AROM from 5 to 110 deg with 5/5 strength. SILT overlying knee. Motion crepitus present. Tenderness along medial joint line.  No popliteal mass palpated. Negative anterior and posterior drawer.  No laxity to varus or valgus stress at 0 or 30 deg.  No patellar apprehension.      General Exam:  Constitutional - NAD, AAO x 3, conversing appropriately.  HEENT- Normocephalic and atraumatic. EOMI, PERRLA, No scleral icterus. No facial deformities. Hearing grossly normal.  Lungs - Breathing non-labored with normal rate. No accessory muscle use.  CV - Extremities warm and well-perfused, brisk capillary refill present.   Neuro - CN II-XII grossly intact.    Results:  X-rays of left knee obtained 5/22/2024 personally interpreted as mild to moderate  generative changes primarily affecting the medial compartment.

## 2024-05-23 PROCEDURE — RXMED WILLOW AMBULATORY MEDICATION CHARGE

## 2024-05-24 ENCOUNTER — PHARMACY VISIT (OUTPATIENT)
Dept: PHARMACY | Facility: CLINIC | Age: 69
End: 2024-05-24
Payer: COMMERCIAL

## 2024-05-25 ENCOUNTER — PHARMACY VISIT (OUTPATIENT)
Dept: PHARMACY | Facility: CLINIC | Age: 69
End: 2024-05-25
Payer: COMMERCIAL

## 2024-05-28 DIAGNOSIS — M54.50 ACUTE LOW BACK PAIN, UNSPECIFIED BACK PAIN LATERALITY, UNSPECIFIED WHETHER SCIATICA PRESENT: ICD-10-CM

## 2024-05-28 DIAGNOSIS — M25.551 RIGHT HIP PAIN: ICD-10-CM

## 2024-05-28 DIAGNOSIS — E11.69 TYPE 2 DIABETES MELLITUS WITH OTHER SPECIFIED COMPLICATION, UNSPECIFIED WHETHER LONG TERM INSULIN USE (MULTI): ICD-10-CM

## 2024-05-28 DIAGNOSIS — K21.9 GASTROESOPHAGEAL REFLUX DISEASE, UNSPECIFIED WHETHER ESOPHAGITIS PRESENT: ICD-10-CM

## 2024-05-28 DIAGNOSIS — N40.0 BENIGN PROSTATIC HYPERPLASIA, UNSPECIFIED WHETHER LOWER URINARY TRACT SYMPTOMS PRESENT: ICD-10-CM

## 2024-05-28 DIAGNOSIS — T78.40XA ALLERGY, INITIAL ENCOUNTER: ICD-10-CM

## 2024-05-28 RX ORDER — LORATADINE 10 MG/1
10 TABLET ORAL NIGHTLY
Qty: 90 TABLET | Refills: 3 | Status: SHIPPED | OUTPATIENT
Start: 2024-05-28

## 2024-05-28 RX ORDER — TRAMADOL HYDROCHLORIDE 50 MG/1
50 TABLET ORAL 3 TIMES DAILY
Qty: 90 TABLET | Refills: 0 | Status: SHIPPED | OUTPATIENT
Start: 2024-05-28

## 2024-05-28 RX ORDER — TAMSULOSIN HYDROCHLORIDE 0.4 MG/1
0.8 CAPSULE ORAL DAILY
Qty: 180 CAPSULE | Refills: 1 | Status: SHIPPED | OUTPATIENT
Start: 2024-05-28 | End: 2024-11-24

## 2024-05-28 RX ORDER — PANTOPRAZOLE SODIUM 40 MG/1
40 TABLET, DELAYED RELEASE ORAL DAILY
Qty: 90 TABLET | Refills: 1 | Status: SHIPPED | OUTPATIENT
Start: 2024-05-28 | End: 2024-11-24

## 2024-05-28 RX ORDER — METFORMIN HYDROCHLORIDE 1000 MG/1
1000 TABLET ORAL
Qty: 180 TABLET | Refills: 1 | Status: SHIPPED | OUTPATIENT
Start: 2024-05-28 | End: 2024-11-24

## 2024-05-28 RX ORDER — ORPHENADRINE CITRATE 100 MG/1
100 TABLET, EXTENDED RELEASE ORAL DAILY
Qty: 90 TABLET | Refills: 1 | Status: SHIPPED | OUTPATIENT
Start: 2024-05-28 | End: 2024-11-24

## 2024-05-29 ENCOUNTER — TELEMEDICINE (OUTPATIENT)
Dept: PHARMACY | Facility: HOSPITAL | Age: 69
End: 2024-05-29
Payer: MEDICARE

## 2024-05-29 DIAGNOSIS — E11.9 TYPE 2 DIABETES MELLITUS WITHOUT COMPLICATION, WITHOUT LONG-TERM CURRENT USE OF INSULIN (MULTI): ICD-10-CM

## 2024-05-29 DIAGNOSIS — E78.5 HYPERLIPIDEMIA LDL GOAL <100: ICD-10-CM

## 2024-05-29 DIAGNOSIS — E11.9 TYPE 2 DIABETES MELLITUS WITHOUT COMPLICATION, WITHOUT LONG-TERM CURRENT USE OF INSULIN (MULTI): Primary | ICD-10-CM

## 2024-05-29 DIAGNOSIS — G70.00 OCULAR MYASTHENIA GRAVIS (MULTI): Primary | ICD-10-CM

## 2024-05-29 RX ORDER — PREDNISONE 1 MG/1
4 TABLET ORAL DAILY
Qty: 135 TABLET | Refills: 6 | Status: SHIPPED | OUTPATIENT
Start: 2024-05-29

## 2024-05-29 NOTE — PROGRESS NOTES
Pharmacist Clinic: Diabetes Management  Shawn Romero is a 68 y.o. male was referred to Clinical Pharmacy Team for diabetes management.     Referring Provider: Christopher D'Amico, DO     HISTORY OF PRESENT ILLNESS  Patient is an insulin dependent T2DM. Historically he was unable to afford meds due to cost. He is approved for the  PAP for ozempic and repatha.     LAB REVIEW   Glucose (mg/dL)   Date Value   2024 99   2023 151 (H)   2023 131 (H)   10/07/2022 179 (H)     Hemoglobin A1C (%)   Date Value   2024 6.7 (H)   2023 7.0 (A)   2023 8.4 (A)   10/07/2022 7.7 (A)     Bicarbonate (mmol/L)   Date Value   2024 39 (H)   2023 36 (H)   2023 32   10/07/2022 31     Urea Nitrogen (mg/dL)   Date Value   2024 19   2023 18   2023 18   10/07/2022 12     Creatinine (mg/dL)   Date Value   2024 0.81   2023 0.92   2023 0.86   10/07/2022 0.89     Lab Results   Component Value Date    HGBA1C 6.7 (H) 2024    HGBA1C 7.0 (A) 2023    HGBA1C 8.4 (A) 2023     Lab Results   Component Value Date    CHOL 120 2024    CHOL 119 2023    CHOL 220 (H) 2023     Lab Results   Component Value Date    HDL 54.2 2024    HDL 49.3 2023    HDL 43.8 2023     Lab Results   Component Value Date    TRIG 148 2024    TRIG 189 (H) 2023    TRIG 187 (H) 2023     DIABETES ASSESSMENT    CURRENT PHARMACOTHERAPY  - metformin 1000 mg 1 by mouth twice daily   - lantus 100U/3ml 40 units nightly   - jardiance 10 mg daily    - humalog 10-14 units with meals 2-3 times per day  - ozempic 2 mg under the skin once weekly (injects on )    SECONDARY PREVENTION  - Statin? No; patient cannot tolerate statins, on PCSK9i  - ACE-I/ARB? No    SMBG:   - 7 day AV    DISCUSSION  - Patient is tolerating his medications without issue  - He had 1 episode of low blood glucose, however I suspect this was not a true low  but rather a misread with freestyle bijan   - Counseled patient to prick his fingers if he ever has a low blood glucose reading with the rule of 15, he understands     RECOMMENDATIONS/PLAN  1. Patients diabetes is well controlled with most recent A1c of 6.7 % (goal < 7 %).   - Continue all meds under the continuation of care with the referring provider and clinical pharmacy team.    Clinical Pharmacist follow up: 2 months  [facility] PAP Approval: 4/16/2024    Thank you,  Janessa Caro, PharmD    Verbal consent to manage patient's drug therapy was obtained from the patient. They were informed they may decline to participate or withdraw from participation in pharmacy services at any time.

## 2024-06-04 ENCOUNTER — OFFICE VISIT (OUTPATIENT)
Dept: OPHTHALMOLOGY | Facility: CLINIC | Age: 69
End: 2024-06-04
Payer: MEDICARE

## 2024-06-04 DIAGNOSIS — G70.00 OCULAR MYASTHENIA GRAVIS (MULTI): ICD-10-CM

## 2024-06-04 DIAGNOSIS — H02.401 PTOSIS OF RIGHT EYELID: ICD-10-CM

## 2024-06-04 DIAGNOSIS — H01.029 SQUAMOUS BLEPHARITIS OF BOTH UPPER AND LOWER EYELID: ICD-10-CM

## 2024-06-04 DIAGNOSIS — E11.9 TYPE 2 DIABETES MELLITUS WITHOUT RETINOPATHY (MULTI): Primary | ICD-10-CM

## 2024-06-04 DIAGNOSIS — H40.053 BORDERLINE GLAUCOMA OF BOTH EYES WITH OCULAR HYPERTENSION: ICD-10-CM

## 2024-06-04 DIAGNOSIS — H52.03 HYPEROPIA WITH REGULAR ASTIGMATISM AND PRESBYOPIA, BILATERAL: ICD-10-CM

## 2024-06-04 DIAGNOSIS — H52.4 HYPEROPIA WITH REGULAR ASTIGMATISM AND PRESBYOPIA, BILATERAL: ICD-10-CM

## 2024-06-04 DIAGNOSIS — H52.223 HYPEROPIA WITH REGULAR ASTIGMATISM AND PRESBYOPIA, BILATERAL: ICD-10-CM

## 2024-06-04 DIAGNOSIS — H25.13 AGE-RELATED NUCLEAR CATARACT OF BOTH EYES: ICD-10-CM

## 2024-06-04 PROCEDURE — 92133 CPTRZD OPH DX IMG PST SGM ON: CPT | Performed by: STUDENT IN AN ORGANIZED HEALTH CARE EDUCATION/TRAINING PROGRAM

## 2024-06-04 PROCEDURE — 92015 DETERMINE REFRACTIVE STATE: CPT | Performed by: STUDENT IN AN ORGANIZED HEALTH CARE EDUCATION/TRAINING PROGRAM

## 2024-06-04 PROCEDURE — 92014 COMPRE OPH EXAM EST PT 1/>: CPT | Performed by: STUDENT IN AN ORGANIZED HEALTH CARE EDUCATION/TRAINING PROGRAM

## 2024-06-04 ASSESSMENT — CONF VISUAL FIELD
OS_NORMAL: 1
OD_NORMAL: 1
OS_INFERIOR_NASAL_RESTRICTION: 0
OS_SUPERIOR_NASAL_RESTRICTION: 0
OS_INFERIOR_TEMPORAL_RESTRICTION: 0
OD_SUPERIOR_TEMPORAL_RESTRICTION: 0
OS_SUPERIOR_TEMPORAL_RESTRICTION: 0
OD_INFERIOR_NASAL_RESTRICTION: 0
OD_SUPERIOR_NASAL_RESTRICTION: 0
METHOD: COUNTING FINGERS
OD_INFERIOR_TEMPORAL_RESTRICTION: 0

## 2024-06-04 ASSESSMENT — REFRACTION_MANIFEST
OD_SPHERE: +0.50
OD_CYLINDER: +2.25
OS_AXIS: 153
METHOD_AUTOREFRACTION: 1
OS_AXIS: 160
OD_SPHERE: +0.75
OD_AXIS: 025
OS_SPHERE: +1.50
OS_CYLINDER: +0.75
OS_SPHERE: +1.50
OS_ADD: +2.50
OD_CYLINDER: +2.25
OD_AXIS: 015
OS_CYLINDER: +1.00
OD_ADD: +2.50

## 2024-06-04 ASSESSMENT — ENCOUNTER SYMPTOMS
PSYCHIATRIC NEGATIVE: 0
GASTROINTESTINAL NEGATIVE: 0
MUSCULOSKELETAL NEGATIVE: 0
ENDOCRINE NEGATIVE: 0
EYES NEGATIVE: 0
HEMATOLOGIC/LYMPHATIC NEGATIVE: 0
CONSTITUTIONAL NEGATIVE: 0
CARDIOVASCULAR NEGATIVE: 0
RESPIRATORY NEGATIVE: 0
NEUROLOGICAL NEGATIVE: 0
ALLERGIC/IMMUNOLOGIC NEGATIVE: 0

## 2024-06-04 ASSESSMENT — REFRACTION_WEARINGRX
OS_AXIS: 160
OS_ADD: +2.50
OS_SPHERE: +1.25
OD_AXIS: 015
OD_CYLINDER: +3.00
OS_CYLINDER: +1.00
OD_SPHERE: PLANO
OD_ADD: +2.50

## 2024-06-04 ASSESSMENT — CUP TO DISC RATIO
OD_RATIO: 0.4
OS_RATIO: 0.4

## 2024-06-04 ASSESSMENT — VISUAL ACUITY
OS_CC: 20/20-
OD_CC: 20/20-
CORRECTION_TYPE: GLASSES
METHOD: SNELLEN - LINEAR

## 2024-06-04 ASSESSMENT — EXTERNAL EXAM - LEFT EYE: OS_EXAM: NORMAL

## 2024-06-04 ASSESSMENT — TONOMETRY
OS_IOP_MMHG: 19
IOP_METHOD: GOLDMANN APPLANATION
OD_IOP_MMHG: 19

## 2024-06-04 NOTE — PROGRESS NOTES
Assessment/Plan   Diagnoses and all orders for this visit:  Age-related nuclear cataract of both eyes  -non visually significant. Pt ed. Monitor  Type 2 diabetes mellitus without retinopathy (Multi)  -no retinopathy observed on exam today od/os, pt ed to continue good BGlc, blood pressure and lipid control, rtc with any changes in vision, otherwise monitor 1 year  Hyperopia with regular astigmatism and presbyopia, bilateral  -small changes to MRX. Can continue with habitual pair. BCVA 20/20 OD+OS  Squamous blepharitis of both upper and lower eyelid  -discussed using lid scrubs. Patient currently cleaning eyes with water and wash cloth. Mild signs.  Borderline glaucoma of both eyes with ocular hypertension  Previously being screened by Dr. Estes  IOP 19/19 c TMAX 22/21  ONH appearance with distinct NRR/good color  Last HVF 24-2 2021: few scattered non specific defects OU-non glaucomatous   OCT RNFL today 6/4/24: normal OU without signs of thinning and stable to 2021  With stable testing, normal ONH appearance and IOP will monitor clinically and retest if there is any changes in IOP or ONH appearance  Pt ed. Very low suspicion of glaucoma at this time  Ocular myasthenia gravis (Multi)  Ptosis of right eyelid  Patient reports stable diplopia in the morning only when he is reading or trying to watch TV. Diplopia will last for 15 minutes after getting up. He is able to control the eye movements and diplopia resolves  Longstanding ptosis right upper lid  Patient's last exam was with Dr. Hayes 4/1/22   Patient is being monitored by Dr. Charbel Trotter for Myasthenia Gravis. He is currently on a regimen of prednisone (4mg) and pyridostigmine.   Patient reports attempting to decrease his prednisone previously and he was unable to taper past 4 mg  Stable ocular alignment today-ortho at distance and slight exophoria at near  Continue to monitor annually-RTC for any worsening or diplopia    RTC 1 year for annual with ESMER SALEH

## 2024-06-25 ENCOUNTER — APPOINTMENT (OUTPATIENT)
Dept: PRIMARY CARE | Facility: CLINIC | Age: 69
End: 2024-06-25
Payer: MEDICARE

## 2024-06-25 ENCOUNTER — LAB (OUTPATIENT)
Dept: LAB | Facility: LAB | Age: 69
End: 2024-06-25
Payer: MEDICARE

## 2024-06-25 VITALS
WEIGHT: 285 LBS | DIASTOLIC BLOOD PRESSURE: 81 MMHG | HEART RATE: 86 BPM | BODY MASS INDEX: 37.77 KG/M2 | HEIGHT: 73 IN | OXYGEN SATURATION: 94 % | SYSTOLIC BLOOD PRESSURE: 126 MMHG

## 2024-06-25 DIAGNOSIS — G47.33 OSA (OBSTRUCTIVE SLEEP APNEA): ICD-10-CM

## 2024-06-25 DIAGNOSIS — E78.5 HYPERLIPIDEMIA, UNSPECIFIED HYPERLIPIDEMIA TYPE: ICD-10-CM

## 2024-06-25 DIAGNOSIS — N52.9 ERECTILE DYSFUNCTION, UNSPECIFIED ERECTILE DYSFUNCTION TYPE: ICD-10-CM

## 2024-06-25 DIAGNOSIS — Z79.891 OPIOID CONTRACT EXISTS: ICD-10-CM

## 2024-06-25 DIAGNOSIS — I10 HYPERTENSION, UNSPECIFIED TYPE: ICD-10-CM

## 2024-06-25 DIAGNOSIS — E11.69 TYPE 2 DIABETES MELLITUS WITH OTHER SPECIFIED COMPLICATION, UNSPECIFIED WHETHER LONG TERM INSULIN USE (MULTI): ICD-10-CM

## 2024-06-25 DIAGNOSIS — K21.9 GASTROESOPHAGEAL REFLUX DISEASE, UNSPECIFIED WHETHER ESOPHAGITIS PRESENT: ICD-10-CM

## 2024-06-25 DIAGNOSIS — G70.00 OCULAR MYASTHENIA GRAVIS (MULTI): ICD-10-CM

## 2024-06-25 DIAGNOSIS — E11.69 TYPE 2 DIABETES MELLITUS WITH OTHER SPECIFIED COMPLICATION, UNSPECIFIED WHETHER LONG TERM INSULIN USE (MULTI): Primary | ICD-10-CM

## 2024-06-25 PROCEDURE — 3048F LDL-C <100 MG/DL: CPT | Performed by: STUDENT IN AN ORGANIZED HEALTH CARE EDUCATION/TRAINING PROGRAM

## 2024-06-25 PROCEDURE — 3074F SYST BP LT 130 MM HG: CPT | Performed by: STUDENT IN AN ORGANIZED HEALTH CARE EDUCATION/TRAINING PROGRAM

## 2024-06-25 PROCEDURE — 1159F MED LIST DOCD IN RCRD: CPT | Performed by: STUDENT IN AN ORGANIZED HEALTH CARE EDUCATION/TRAINING PROGRAM

## 2024-06-25 PROCEDURE — G2211 COMPLEX E/M VISIT ADD ON: HCPCS | Performed by: STUDENT IN AN ORGANIZED HEALTH CARE EDUCATION/TRAINING PROGRAM

## 2024-06-25 PROCEDURE — 80053 COMPREHEN METABOLIC PANEL: CPT

## 2024-06-25 PROCEDURE — 80061 LIPID PANEL: CPT

## 2024-06-25 PROCEDURE — 1160F RVW MEDS BY RX/DR IN RCRD: CPT | Performed by: STUDENT IN AN ORGANIZED HEALTH CARE EDUCATION/TRAINING PROGRAM

## 2024-06-25 PROCEDURE — 1125F AMNT PAIN NOTED PAIN PRSNT: CPT | Performed by: STUDENT IN AN ORGANIZED HEALTH CARE EDUCATION/TRAINING PROGRAM

## 2024-06-25 PROCEDURE — 36415 COLL VENOUS BLD VENIPUNCTURE: CPT

## 2024-06-25 PROCEDURE — 83036 HEMOGLOBIN GLYCOSYLATED A1C: CPT

## 2024-06-25 PROCEDURE — 80307 DRUG TEST PRSMV CHEM ANLYZR: CPT

## 2024-06-25 PROCEDURE — 99214 OFFICE O/P EST MOD 30 MIN: CPT | Performed by: STUDENT IN AN ORGANIZED HEALTH CARE EDUCATION/TRAINING PROGRAM

## 2024-06-25 PROCEDURE — 82570 ASSAY OF URINE CREATININE: CPT

## 2024-06-25 PROCEDURE — 3079F DIAST BP 80-89 MM HG: CPT | Performed by: STUDENT IN AN ORGANIZED HEALTH CARE EDUCATION/TRAINING PROGRAM

## 2024-06-25 PROCEDURE — 85027 COMPLETE CBC AUTOMATED: CPT

## 2024-06-25 PROCEDURE — 82043 UR ALBUMIN QUANTITATIVE: CPT

## 2024-06-25 PROCEDURE — 3044F HG A1C LEVEL LT 7.0%: CPT | Performed by: STUDENT IN AN ORGANIZED HEALTH CARE EDUCATION/TRAINING PROGRAM

## 2024-06-25 ASSESSMENT — PAIN SCALES - GENERAL: PAINLEVEL: 7

## 2024-06-25 NOTE — PROGRESS NOTES
68-year-old male presenting for follow-up on chronic conditions.    DMII  Stable, doing well on current regimen. Continues to follow with [facility] pharmacist.     HTN  Currently not taking any medications, asymptomatic     HLD  Taking Repatha, tolerating well.     Chronic low back pain, chronic knee pain  Stable, tolerates current regimen well     ED  Stable     GERD  Stable     Ocular MG  Stable, following with neuro.     DARIA  Reports perfect compliance with PAP therapy, doing well    SocHx:   - Smoking: About 25 pack years, quit 12 years ago    12 point ROS reviewed and negative other than as stated in HPI      General: Alert, oriented, pleasant, in no acute distress  HEENT:      Head: normocephalic, atraumatic;      eyes: EOMI, no scleral icterus;   Neck: soft, supple, non-tender, no masses appreciated  CV: Heart with regular rate and rhythm, normal S1/S2, no murmurs  Lungs: CTAB without wheezing, rhonchi or rales; good respiratory effort, no increased work of breathing  Neuro: Cranial nerves grossly intact; alert and oriented, normal gait  Psych: Appropriate mood and affect    #DMII  -Last A1C: 6.7 date: 03/2024  -Last Albumin/Creat ration: WNL Date: 10/21/21  -Current Medications: Lantus 25  units at bedtime, Humalog 10-12 units with meals, metformin 1000 mg twice daily, Jardiance 10 mg daily, Ozempic 2 mg weekly  -Statin: None, reports allergy to all statins  -ASA: None  -ACE/ARB: None  -Medication changes: None, continue to follow with [facility] pharmacist     #HTN  - At goal in office  -Currently unmedicated, advised to monitor at home     #HLD  -Continue Repatha, doing very well  -Repeat lipid at goal     #BPH  -Continue tamsulosin 0.4 mg 2 tablets daily, following with urology     #Chronic low back pain #chronic L knee pain  -CSA and UDS updated in office today  -Continue tramadol 50 mg 3 times daily as needed  -Continue Norflex as needed     #ED  -Continue tadalafil 20 mg as needed     #GERD  -Continue pantoprazole  40 mg daily     #Ocular MG  -Continue to follow with neurology  -Current medications include prednisone 4 mg daily, pyridostigmine 60 mg 3 times daily     #DARIA  - Continue PAP therapy    F/U 3 months, Medicare in March    Chris D'Amico, DO

## 2024-06-26 ENCOUNTER — TELEPHONE (OUTPATIENT)
Dept: PRIMARY CARE | Facility: CLINIC | Age: 69
End: 2024-06-26
Payer: MEDICARE

## 2024-06-26 LAB
ALBUMIN SERPL BCP-MCNC: 4.4 G/DL (ref 3.4–5)
ALP SERPL-CCNC: 55 U/L (ref 33–136)
ALT SERPL W P-5'-P-CCNC: 21 U/L (ref 10–52)
AMPHETAMINES UR QL SCN: NORMAL
ANION GAP SERPL CALC-SCNC: 16 MMOL/L (ref 10–20)
AST SERPL W P-5'-P-CCNC: 15 U/L (ref 9–39)
BARBITURATES UR QL SCN: NORMAL
BENZODIAZ UR QL SCN: NORMAL
BILIRUB SERPL-MCNC: 0.6 MG/DL (ref 0–1.2)
BUN SERPL-MCNC: 8 MG/DL (ref 6–23)
BZE UR QL SCN: NORMAL
CALCIUM SERPL-MCNC: 9.6 MG/DL (ref 8.6–10.6)
CANNABINOIDS UR QL SCN: NORMAL
CHLORIDE SERPL-SCNC: 97 MMOL/L (ref 98–107)
CHOLEST SERPL-MCNC: 68 MG/DL (ref 0–199)
CHOLESTEROL/HDL RATIO: 1.4
CO2 SERPL-SCNC: 30 MMOL/L (ref 21–32)
CREAT SERPL-MCNC: 0.85 MG/DL (ref 0.5–1.3)
CREAT UR-MCNC: 103.3 MG/DL (ref 20–370)
EGFRCR SERPLBLD CKD-EPI 2021: >90 ML/MIN/1.73M*2
ERYTHROCYTE [DISTWIDTH] IN BLOOD BY AUTOMATED COUNT: 14.6 % (ref 11.5–14.5)
EST. AVERAGE GLUCOSE BLD GHB EST-MCNC: 140 MG/DL
FENTANYL+NORFENTANYL UR QL SCN: NORMAL
GLUCOSE SERPL-MCNC: 112 MG/DL (ref 74–99)
HBA1C MFR BLD: 6.5 %
HCT VFR BLD AUTO: 51.7 % (ref 41–52)
HDLC SERPL-MCNC: 49.4 MG/DL
HGB BLD-MCNC: 16.2 G/DL (ref 13.5–17.5)
MCH RBC QN AUTO: 28.8 PG (ref 26–34)
MCHC RBC AUTO-ENTMCNC: 31.3 G/DL (ref 32–36)
MCV RBC AUTO: 92 FL (ref 80–100)
METHADONE UR QL SCN: NORMAL
MICROALBUMIN UR-MCNC: 33.9 MG/L
MICROALBUMIN/CREAT UR: 32.8 UG/MG CREAT
NON HDL CHOLESTEROL: 19 MG/DL (ref 0–149)
NRBC BLD-RTO: 0 /100 WBCS (ref 0–0)
OPIATES UR QL SCN: NORMAL
OXYCODONE+OXYMORPHONE UR QL SCN: NORMAL
PCP UR QL SCN: NORMAL
PLATELET # BLD AUTO: 226 X10*3/UL (ref 150–450)
POTASSIUM SERPL-SCNC: 4.3 MMOL/L (ref 3.5–5.3)
PROT SERPL-MCNC: 7.2 G/DL (ref 6.4–8.2)
RBC # BLD AUTO: 5.63 X10*6/UL (ref 4.5–5.9)
SODIUM SERPL-SCNC: 139 MMOL/L (ref 136–145)
TRIGL SERPL-MCNC: 177 MG/DL (ref 0–149)
VLDL: 35 MG/DL (ref 0–40)
WBC # BLD AUTO: 12.4 X10*3/UL (ref 4.4–11.3)

## 2024-06-26 PROCEDURE — RXMED WILLOW AMBULATORY MEDICATION CHARGE

## 2024-06-26 NOTE — RESULT ENCOUNTER NOTE
Hemoglobin A1c at 6.5, very good    Borderline elevation in white blood cell count, can be seen with inflammation, will continue to monitor.    Mildly positive urine protein.  Will have to consider low-dose lisinopril or losartan if persistent.  Will continue to monitor.    Remaining labs unremarkable.

## 2024-06-26 NOTE — TELEPHONE ENCOUNTER
Result Communication    Resulted Orders   CBC   Result Value Ref Range    WBC 12.4 (H) 4.4 - 11.3 x10*3/uL    nRBC 0.0 0.0 - 0.0 /100 WBCs    RBC 5.63 4.50 - 5.90 x10*6/uL    Hemoglobin 16.2 13.5 - 17.5 g/dL    Hematocrit 51.7 41.0 - 52.0 %    MCV 92 80 - 100 fL    MCH 28.8 26.0 - 34.0 pg    MCHC 31.3 (L) 32.0 - 36.0 g/dL    RDW 14.6 (H) 11.5 - 14.5 %    Platelets 226 150 - 450 x10*3/uL   Comprehensive Metabolic Panel   Result Value Ref Range    Glucose 112 (H) 74 - 99 mg/dL    Sodium 139 136 - 145 mmol/L    Potassium 4.3 3.5 - 5.3 mmol/L    Chloride 97 (L) 98 - 107 mmol/L    Bicarbonate 30 21 - 32 mmol/L    Anion Gap 16 10 - 20 mmol/L    Urea Nitrogen 8 6 - 23 mg/dL    Creatinine 0.85 0.50 - 1.30 mg/dL    eGFR >90 >60 mL/min/1.73m*2      Comment:      Calculations of estimated GFR are performed using the 2021 CKD-EPI Study Refit equation without the race variable for the IDMS-Traceable creatinine methods.  https://jasn.asnjournals.org/content/early/2021/09/22/ASN.3593559166    Calcium 9.6 8.6 - 10.6 mg/dL    Albumin 4.4 3.4 - 5.0 g/dL    Alkaline Phosphatase 55 33 - 136 U/L    Total Protein 7.2 6.4 - 8.2 g/dL    AST 15 9 - 39 U/L    Bilirubin, Total 0.6 0.0 - 1.2 mg/dL    ALT 21 10 - 52 U/L      Comment:      Patients treated with Sulfasalazine may generate falsely decreased results for ALT.   Lipid Panel   Result Value Ref Range    Cholesterol 68 0 - 199 mg/dL      Comment:            Age      Desirable   Borderline High   High     0-19 Y     0 - 169       170 - 199     >/= 200    20-24 Y     0 - 189       190 - 224     >/= 225         >24 Y     0 - 199       200 - 239     >/= 240   **All ranges are based on fasting samples. Specific   therapeutic targets will vary based on patient-specific   cardiac risk.    Pediatric guidelines reference:Pediatrics 2011, 128(S5).Adult guidelines reference: NCEP ATPIII Guidelines,LOREN 2001, 258:2486-97    Venipuncture immediately after or during the administration of Metamizole  may lead to falsely low results. Testing should be performed immediately prior to Metamizole dosing.    HDL-Cholesterol 49.4 mg/dL      Comment:        Age       Very Low   Low     Normal    High    0-19 Y    < 35      < 40     40-45     ----  20-24 Y    ----     < 40      >45      ----        >24 Y      ----     < 40     40-60      >60      Cholesterol/HDL Ratio 1.4       Comment:        Ref Values  Desirable  < 3.4  High Risk  > 5.0    VLDL 35 0 - 40 mg/dL    Triglycerides 177 (H) 0 - 149 mg/dL      Comment:         Age         Desirable   Borderline High   High     Very High   0 D-90 D    19 - 174         ----         ----        ----  91 D- 9 Y     0 -  74        75 -  99     >/= 100      ----    10-19 Y     0 -  89        90 - 129     >/= 130      ----    20-24 Y     0 - 114       115 - 149     >/= 150      ----         >24 Y     0 - 149       150 - 199    200- 499    >/= 500    Venipuncture immediately after or during the administration of Metamizole may lead to falsely low results. Testing should be performed immediately prior to Metamizole dosing.    Non HDL Cholesterol 19 0 - 149 mg/dL      Comment:            Age       Desirable   Borderline High   High     Very High     0-19 Y     0 - 119       120 - 144     >/= 145    >/= 160    20-24 Y     0 - 149       150 - 189     >/= 190      ----         >24 Y    30 mg/dL above LDL Cholesterol goal     Hemoglobin A1C   Result Value Ref Range    Hemoglobin A1C 6.5 (H) see below %    Estimated Average Glucose 140 Not Established mg/dL    Narrative    Diagnosis of Diabetes-Adults  Non-Diabetic: < or = 5.6%  Increased risk for developing diabetes: 5.7-6.4%  Diagnostic of diabetes: > or = 6.5%    Monitoring of Diabetes  Age (y)....................... Therapeutic Goal (%)  Adults: >18.........................<7.0  Pediatrics: 13-18...................<7.5  Pediatrics: 7-12....................<8.0  Pediatrics: 0-6..................... 7.5-8.5    American Diabetes  Association. Diabetes Care 33(S1), Jan 2010       Drug Screen, Urine With Reflex to Confirmation   Result Value Ref Range    Amphetamine Screen, Urine Presumptive Negative Presumptive Negative      Comment:      CUTOFF LEVEL: 500 NG/ML   Cross-reactivity has been reported with high concentrations   of the following drugs: buproprion, chloroquine, chlorpromazine,   ephedrine, mephentermine, fenfluramine, phentermine,   phenylpropanolamine, pseudoephedrine, and propranolol.    Barbiturate Screen, Urine Presumptive Negative Presumptive Negative      Comment:      CUTOFF LEVEL: 200 NG/ML    Benzodiazepines Screen, Urine Presumptive Negative Presumptive Negative      Comment:      CUTOFF LEVEL: 200 NG/ML    Cannabinoid Screen, Urine Presumptive Negative Presumptive Negative      Comment:      CUTOFF LEVEL: 50 NG/ML    Cocaine Metabolite Screen, Urine Presumptive Negative Presumptive Negative      Comment:      CUTOFF LEVEL: 150 NG/ML    Fentanyl Screen, Urine Presumptive Negative Presumptive Negative      Comment:      CUTOFF LEVEL: 5 NG/ML    Opiate Screen, Urine Presumptive Negative Presumptive Negative      Comment:      CUTOFF LEVEL: 300 NG/ML  The opiate screen does not detect fentanyl, meperidine, or   tramadol. Oxycodone is not consistently detected (refer to  Oxycodone Screen, Urine result).    Oxycodone Screen, Urine Presumptive Negative Presumptive Negative      Comment:      CUTOFF LEVEL: 100 NG/ML  This test will accurately detect both oxycodone and oxymorphone.    PCP Screen, Urine Presumptive Negative Presumptive Negative      Comment:      CUTOFF LEVEL:  25 NG/ML  Cross-reactivity has been reported with dextromethorphan.    Methadone Screen, Urine Presumptive Negative Presumptive Negative      Comment:      CUTOFF LEVEL: 150 NG/ML  The metabolite L-alpha-acetylmethadol (LAAM) is not  detected by this method in concentrations that would  be found in the urine of patients on LAAM therapy.    Narrative     Drug screen results are presumptive and should not be used to assess   compliance with prescribed medication. Definitive confirmatory drug testing   has been added to this sample for any positive screen result and will be  reported separately.     Toxicology screening results are reported qualitatively. The concentration must  be greater than or equal to the cutoff to be reported as positive. The concentration   at which the screening test can detect an individual drug or metabolite varies.   The absence of expected drug(s) and/or drug metabolite(s) may indicate non-compliance,   inappropriate timing of specimen collection relative to drug administration, poor drug   absorption, diluted/adulterated urine, or limitations of testing. For medical purposes   only; not valid for forensic use.    Interpretive questions should be directed to the laboratory medical directors.   Albumin , Urine Random   Result Value Ref Range    Albumin, Urine Random 33.9 Not established mg/L    Creatinine, Urine Random 103.3 20.0 - 370.0 mg/dL    Albumin/Creatinine Ratio 32.8 (H) <30.0 ug/mg Creat       10:50 AM      Results were not successfully communicated with the patient and they did not acknowledge their understanding.

## 2024-06-26 NOTE — TELEPHONE ENCOUNTER
----- Message from Christopher D'Amico, DO sent at 6/26/2024  7:59 AM EDT -----  Hemoglobin A1c at 6.5, very good    Borderline elevation in white blood cell count, can be seen with inflammation, will continue to monitor.    Mildly positive urine protein.  Will have to consider low-dose lisinopril or losartan if persistent.  Will continue to monitor.    Remaining labs unremarkable.

## 2024-06-27 ENCOUNTER — TELEPHONE (OUTPATIENT)
Dept: PRIMARY CARE | Facility: CLINIC | Age: 69
End: 2024-06-27
Payer: MEDICARE

## 2024-06-27 NOTE — TELEPHONE ENCOUNTER
Result Communication    Resulted Orders   CBC   Result Value Ref Range    WBC 12.4 (H) 4.4 - 11.3 x10*3/uL    nRBC 0.0 0.0 - 0.0 /100 WBCs    RBC 5.63 4.50 - 5.90 x10*6/uL    Hemoglobin 16.2 13.5 - 17.5 g/dL    Hematocrit 51.7 41.0 - 52.0 %    MCV 92 80 - 100 fL    MCH 28.8 26.0 - 34.0 pg    MCHC 31.3 (L) 32.0 - 36.0 g/dL    RDW 14.6 (H) 11.5 - 14.5 %    Platelets 226 150 - 450 x10*3/uL   Comprehensive Metabolic Panel   Result Value Ref Range    Glucose 112 (H) 74 - 99 mg/dL    Sodium 139 136 - 145 mmol/L    Potassium 4.3 3.5 - 5.3 mmol/L    Chloride 97 (L) 98 - 107 mmol/L    Bicarbonate 30 21 - 32 mmol/L    Anion Gap 16 10 - 20 mmol/L    Urea Nitrogen 8 6 - 23 mg/dL    Creatinine 0.85 0.50 - 1.30 mg/dL    eGFR >90 >60 mL/min/1.73m*2      Comment:      Calculations of estimated GFR are performed using the 2021 CKD-EPI Study Refit equation without the race variable for the IDMS-Traceable creatinine methods.  https://jasn.asnjournals.org/content/early/2021/09/22/ASN.3153828097    Calcium 9.6 8.6 - 10.6 mg/dL    Albumin 4.4 3.4 - 5.0 g/dL    Alkaline Phosphatase 55 33 - 136 U/L    Total Protein 7.2 6.4 - 8.2 g/dL    AST 15 9 - 39 U/L    Bilirubin, Total 0.6 0.0 - 1.2 mg/dL    ALT 21 10 - 52 U/L      Comment:      Patients treated with Sulfasalazine may generate falsely decreased results for ALT.   Lipid Panel   Result Value Ref Range    Cholesterol 68 0 - 199 mg/dL      Comment:            Age      Desirable   Borderline High   High     0-19 Y     0 - 169       170 - 199     >/= 200    20-24 Y     0 - 189       190 - 224     >/= 225         >24 Y     0 - 199       200 - 239     >/= 240   **All ranges are based on fasting samples. Specific   therapeutic targets will vary based on patient-specific   cardiac risk.    Pediatric guidelines reference:Pediatrics 2011, 128(S5).Adult guidelines reference: NCEP ATPIII Guidelines,LOREN 2001, 258:2486-97    Venipuncture immediately after or during the administration of Metamizole  may lead to falsely low results. Testing should be performed immediately prior to Metamizole dosing.    HDL-Cholesterol 49.4 mg/dL      Comment:        Age       Very Low   Low     Normal    High    0-19 Y    < 35      < 40     40-45     ----  20-24 Y    ----     < 40      >45      ----        >24 Y      ----     < 40     40-60      >60      Cholesterol/HDL Ratio 1.4       Comment:        Ref Values  Desirable  < 3.4  High Risk  > 5.0    VLDL 35 0 - 40 mg/dL    Triglycerides 177 (H) 0 - 149 mg/dL      Comment:         Age         Desirable   Borderline High   High     Very High   0 D-90 D    19 - 174         ----         ----        ----  91 D- 9 Y     0 -  74        75 -  99     >/= 100      ----    10-19 Y     0 -  89        90 - 129     >/= 130      ----    20-24 Y     0 - 114       115 - 149     >/= 150      ----         >24 Y     0 - 149       150 - 199    200- 499    >/= 500    Venipuncture immediately after or during the administration of Metamizole may lead to falsely low results. Testing should be performed immediately prior to Metamizole dosing.    Non HDL Cholesterol 19 0 - 149 mg/dL      Comment:            Age       Desirable   Borderline High   High     Very High     0-19 Y     0 - 119       120 - 144     >/= 145    >/= 160    20-24 Y     0 - 149       150 - 189     >/= 190      ----         >24 Y    30 mg/dL above LDL Cholesterol goal     Hemoglobin A1C   Result Value Ref Range    Hemoglobin A1C 6.5 (H) see below %    Estimated Average Glucose 140 Not Established mg/dL    Narrative    Diagnosis of Diabetes-Adults  Non-Diabetic: < or = 5.6%  Increased risk for developing diabetes: 5.7-6.4%  Diagnostic of diabetes: > or = 6.5%    Monitoring of Diabetes  Age (y)....................... Therapeutic Goal (%)  Adults: >18.........................<7.0  Pediatrics: 13-18...................<7.5  Pediatrics: 7-12....................<8.0  Pediatrics: 0-6..................... 7.5-8.5    American Diabetes  Association. Diabetes Care 33(S1), Jan 2010       Drug Screen, Urine With Reflex to Confirmation   Result Value Ref Range    Amphetamine Screen, Urine Presumptive Negative Presumptive Negative      Comment:      CUTOFF LEVEL: 500 NG/ML   Cross-reactivity has been reported with high concentrations   of the following drugs: buproprion, chloroquine, chlorpromazine,   ephedrine, mephentermine, fenfluramine, phentermine,   phenylpropanolamine, pseudoephedrine, and propranolol.    Barbiturate Screen, Urine Presumptive Negative Presumptive Negative      Comment:      CUTOFF LEVEL: 200 NG/ML    Benzodiazepines Screen, Urine Presumptive Negative Presumptive Negative      Comment:      CUTOFF LEVEL: 200 NG/ML    Cannabinoid Screen, Urine Presumptive Negative Presumptive Negative      Comment:      CUTOFF LEVEL: 50 NG/ML    Cocaine Metabolite Screen, Urine Presumptive Negative Presumptive Negative      Comment:      CUTOFF LEVEL: 150 NG/ML    Fentanyl Screen, Urine Presumptive Negative Presumptive Negative      Comment:      CUTOFF LEVEL: 5 NG/ML    Opiate Screen, Urine Presumptive Negative Presumptive Negative      Comment:      CUTOFF LEVEL: 300 NG/ML  The opiate screen does not detect fentanyl, meperidine, or   tramadol. Oxycodone is not consistently detected (refer to  Oxycodone Screen, Urine result).    Oxycodone Screen, Urine Presumptive Negative Presumptive Negative      Comment:      CUTOFF LEVEL: 100 NG/ML  This test will accurately detect both oxycodone and oxymorphone.    PCP Screen, Urine Presumptive Negative Presumptive Negative      Comment:      CUTOFF LEVEL:  25 NG/ML  Cross-reactivity has been reported with dextromethorphan.    Methadone Screen, Urine Presumptive Negative Presumptive Negative      Comment:      CUTOFF LEVEL: 150 NG/ML  The metabolite L-alpha-acetylmethadol (LAAM) is not  detected by this method in concentrations that would  be found in the urine of patients on LAAM therapy.    Narrative     Drug screen results are presumptive and should not be used to assess   compliance with prescribed medication. Definitive confirmatory drug testing   has been added to this sample for any positive screen result and will be  reported separately.     Toxicology screening results are reported qualitatively. The concentration must  be greater than or equal to the cutoff to be reported as positive. The concentration   at which the screening test can detect an individual drug or metabolite varies.   The absence of expected drug(s) and/or drug metabolite(s) may indicate non-compliance,   inappropriate timing of specimen collection relative to drug administration, poor drug   absorption, diluted/adulterated urine, or limitations of testing. For medical purposes   only; not valid for forensic use.    Interpretive questions should be directed to the laboratory medical directors.   Albumin , Urine Random   Result Value Ref Range    Albumin, Urine Random 33.9 Not established mg/L    Creatinine, Urine Random 103.3 20.0 - 370.0 mg/dL    Albumin/Creatinine Ratio 32.8 (H) <30.0 ug/mg Creat       3:36 PM      Results were successfully communicated with the patient and they acknowledged their understanding.

## 2024-06-28 ENCOUNTER — PHARMACY VISIT (OUTPATIENT)
Dept: PHARMACY | Facility: CLINIC | Age: 69
End: 2024-06-28
Payer: COMMERCIAL

## 2024-07-07 DIAGNOSIS — M25.551 RIGHT HIP PAIN: ICD-10-CM

## 2024-07-07 DIAGNOSIS — M79.2 NEUROPATHIC PAIN OF UPPER EXTREMITY: ICD-10-CM

## 2024-07-08 DIAGNOSIS — M79.2 NEUROPATHIC PAIN OF UPPER EXTREMITY: ICD-10-CM

## 2024-07-08 DIAGNOSIS — K21.9 GASTROESOPHAGEAL REFLUX DISEASE, UNSPECIFIED WHETHER ESOPHAGITIS PRESENT: ICD-10-CM

## 2024-07-08 PROCEDURE — RXMED WILLOW AMBULATORY MEDICATION CHARGE

## 2024-07-08 RX ORDER — PREGABALIN 75 MG/1
75 CAPSULE ORAL 2 TIMES DAILY
Qty: 60 CAPSULE | Refills: 2 | OUTPATIENT
Start: 2024-07-08 | End: 2024-10-06

## 2024-07-08 RX ORDER — PREGABALIN 75 MG/1
75 CAPSULE ORAL 2 TIMES DAILY
Qty: 60 CAPSULE | Refills: 2 | Status: SHIPPED | OUTPATIENT
Start: 2024-07-08 | End: 2024-10-06

## 2024-07-08 RX ORDER — PANTOPRAZOLE SODIUM 40 MG/1
40 TABLET, DELAYED RELEASE ORAL DAILY
Qty: 90 TABLET | Refills: 1 | Status: SHIPPED | OUTPATIENT
Start: 2024-07-08 | End: 2025-01-04

## 2024-07-08 RX ORDER — SUCRALFATE 1 G/1
1 TABLET ORAL DAILY PRN
Qty: 90 TABLET | Refills: 1 | Status: SHIPPED | OUTPATIENT
Start: 2024-07-08 | End: 2025-01-04

## 2024-07-08 RX ORDER — TRAMADOL HYDROCHLORIDE 50 MG/1
50 TABLET ORAL 3 TIMES DAILY
Qty: 90 TABLET | Refills: 0 | Status: SHIPPED | OUTPATIENT
Start: 2024-07-08

## 2024-07-08 NOTE — TELEPHONE ENCOUNTER
----- Message from Anabel Bruno sent at 7/8/2024  9:26 AM EDT -----  Stated he's out of Lyrica medication takes 75 mg (AMB) 1 cap 2x's per day, uses [facility] on [address], [phone].

## 2024-07-09 ENCOUNTER — PHARMACY VISIT (OUTPATIENT)
Dept: PHARMACY | Facility: CLINIC | Age: 69
End: 2024-07-09
Payer: COMMERCIAL

## 2024-07-11 ENCOUNTER — APPOINTMENT (OUTPATIENT)
Dept: ORTHOPEDIC SURGERY | Facility: CLINIC | Age: 69
End: 2024-07-11
Payer: MEDICARE

## 2024-07-11 ENCOUNTER — HOSPITAL ENCOUNTER (OUTPATIENT)
Dept: RADIOLOGY | Facility: CLINIC | Age: 69
Discharge: HOME | End: 2024-07-11
Payer: MEDICARE

## 2024-07-11 VITALS — WEIGHT: 285 LBS | HEIGHT: 73 IN | BODY MASS INDEX: 37.77 KG/M2

## 2024-07-11 DIAGNOSIS — M25.551 RIGHT HIP PAIN: Primary | ICD-10-CM

## 2024-07-11 DIAGNOSIS — M25.551 RIGHT HIP PAIN: ICD-10-CM

## 2024-07-11 PROCEDURE — 3044F HG A1C LEVEL LT 7.0%: CPT | Performed by: STUDENT IN AN ORGANIZED HEALTH CARE EDUCATION/TRAINING PROGRAM

## 2024-07-11 PROCEDURE — 73502 X-RAY EXAM HIP UNI 2-3 VIEWS: CPT | Mod: RIGHT SIDE | Performed by: RADIOLOGY

## 2024-07-11 PROCEDURE — 1125F AMNT PAIN NOTED PAIN PRSNT: CPT | Performed by: STUDENT IN AN ORGANIZED HEALTH CARE EDUCATION/TRAINING PROGRAM

## 2024-07-11 PROCEDURE — 1159F MED LIST DOCD IN RCRD: CPT | Performed by: STUDENT IN AN ORGANIZED HEALTH CARE EDUCATION/TRAINING PROGRAM

## 2024-07-11 PROCEDURE — 3048F LDL-C <100 MG/DL: CPT | Performed by: STUDENT IN AN ORGANIZED HEALTH CARE EDUCATION/TRAINING PROGRAM

## 2024-07-11 PROCEDURE — 99215 OFFICE O/P EST HI 40 MIN: CPT | Performed by: STUDENT IN AN ORGANIZED HEALTH CARE EDUCATION/TRAINING PROGRAM

## 2024-07-11 PROCEDURE — 73502 X-RAY EXAM HIP UNI 2-3 VIEWS: CPT | Mod: RT

## 2024-07-11 PROCEDURE — 3060F POS MICROALBUMINURIA REV: CPT | Performed by: STUDENT IN AN ORGANIZED HEALTH CARE EDUCATION/TRAINING PROGRAM

## 2024-07-11 ASSESSMENT — PAIN DESCRIPTION - DESCRIPTORS: DESCRIPTORS: ACHING

## 2024-07-11 ASSESSMENT — PAIN SCALES - GENERAL: PAINLEVEL_OUTOF10: 6

## 2024-07-11 ASSESSMENT — PAIN - FUNCTIONAL ASSESSMENT: PAIN_FUNCTIONAL_ASSESSMENT: 0-10

## 2024-07-11 NOTE — PROGRESS NOTES
ESTHELA/TKA Related Summary           L hip: N  L knee: N  R hip: N  R knee: N  Lumbar surgery or significant pathology: N            CC/SUBJECTIVE/HPI            PCP: Christopher D'Amico, DO  Referring provider: No ref. provider found  Shawn Romero is a 69 y.o. male presenting for R hip pain. The patient has seen Dr. Johnson and Dr. Gtz for his hip pain previously. At his latest appointment with Dr. Gtz on 5/22/24 he was seen for L knee pain that was controlled with conservative measures and reports that his previous R hip CSI on 5/1/24 provided no relief for his existing R hip pain. Today, he reports continuation of his R hip pain despite at home exercises.     R hip  Symptoms  Pain: 4-9/10  Onset: chronic/gradual  Duration: 3mo-1yr  Location: side of hip/lateral pelvis  Quality: sharp/stab  Limitations: morning stiffness, pain after activity, night pain, and difficulty with socks/shoes/clothes  Ambulation limit due to pain: 100-500ft  Other symptoms: none  Treatment  Tried: weight loss, home exercises, ice/heat, and topical gel (ie Voltaren)  Most recent injection <3mo ago? Y (5/1/2024)  Assistive device: none  Treatment attempted for 3mo-1yr and is partially effective          HISTORIES (System Generated and Pt Reported on Form Today)       Dental  Pt reported: No active issues     PMH  Pt reported: Denies heart/lung/kidney/liver issues, DM, stroke, seizure, bariatric surgery, anticoag, MRSA, cancer, personal/familial coagulopathies except: none in addition to below  System generated (PMH, problem list both included since EMR change caused discrepancies):   Past Medical History:   Diagnosis Date    Diverticulosis of large intestine without perforation or abscess without bleeding 06/15/2015    Diverticulosis of colon    Other conditions influencing health status 04/21/2014    Myalgia and myositis    Other muscle spasm 06/15/2015    Muscle spasm    Personal history of nicotine dependence 07/06/2016    History of  nicotine dependence    Personal history of other specified conditions 04/21/2014    History of chest pain    Personal history of other specified conditions 04/21/2014    History of abdominal pain      Patient Active Problem List   Diagnosis    Ocular myasthenia gravis (Multi)    Obesity, morbid (Multi)    Type 2 diabetes mellitus with other specified complication, unspecified whether long term insulin use (Multi)    BPH (benign prostatic hyperplasia)    Hyperlipidemia LDL goal <100    Erectile dysfunction    Elevated PSA    GERD (gastroesophageal reflux disease)    Achilles tendinitis of left lower extremity    Acquired short Achilles tendon of left lower extremity    Attention and concentration deficit    Chronic back pain    Bone spur of foot    Borderline glaucoma with ocular hypertension    Chondromalacia of knee, right    Convergence insufficiency    Cubital tunnel syndrome on left    Dermatitis, eczematoid    Type 2 diabetes mellitus without retinopathy (Multi)    Myasthenia gravis (Multi)    Sesamoiditis    Vitamin B12 deficiency    Vitamin D deficiency    Weight gain    Ptosis of right eyelid    Pronation of both feet    Primary localized osteoarthrosis of left lower leg    Plantar fasciitis, left    Paralytic strabismus, sixth or abducens nerve palsy    Nicotine dependence in remission    Myalgia due to statin    Internal derangement of left knee    Insomnia    Inguinal bulge    Skin lesion    Hernia of anterior abdominal wall    Fatigue    Benign neoplasm of rectum and anal canal    Uncomplicated asthma (HHS-HCC)    Snoring    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   System generated:   Past Surgical History:   Procedure Laterality Date    HERNIA REPAIR  09/05/2013    Hernia Repair     Family Hx  Pt reported clot/coagulopathies: none    Social Hx  Pt reported substance use: N  System generated:   Social History     Tobacco Use    Smoking status: Never   Vaping Use    Vaping status: Never Used     Allergies  Pt reported (meds, metals): per below  System generated:   Allergies   Allergen Reactions    Hydrocodone-Acetaminophen Anxiety, Dizziness, Palpitations and Shortness of breath    Oxycodone-Acetaminophen Anxiety, Itching, Palpitations, Rash and Shortness of breath    Cholestyramine Bleeding    Shellfish Derived Unknown    Sulfa (Sulfonamide Antibiotics) Bleeding    Codeine Anxiety, Hives, Itching, Rash and Swelling    Iodine Anxiety, Diarrhea, Itching, Rash and Swelling    Penicillins Anxiety, Palpitations, Rash and Swelling    Statins-Hmg-Coa Reductase Inhibitors Rash    Sulfamethoxazole-Trimethoprim Itching, Rash and Swelling     Current Meds  System generated:   Current Outpatient Medications:     alpha lipoic acid 600 mg capsule, Take 1 capsule by mouth once daily., Disp: , Rfl:     ascorbic acid (Vitamin C) 1,000 mg tablet, Take 1 tablet (1,000 mg) by mouth once daily., Disp: , Rfl:     Blood glucose monitoring meter kit kit, 1 each if needed., Disp: , Rfl:     blood sugar diagnostic (OneTouch Ultra Test) strip, Use to check blood sugar level three times per day, Disp: 100 strip, Rfl: 3    cholecalciferol (Vitamin D-3) 25 MCG (1000 UT) tablet, Take 1 tablet (25 mcg) by mouth once daily., Disp: , Rfl:     cyanocobalamin, vitamin B-12, 3,000 mcg capsule, Take by mouth., Disp: , Rfl:     empagliflozin (Jardiance) 25 mg, Take 1 tablet (25 mg) by mouth once daily., Disp: 90 tablet, Rfl: 3    evolocumab (Repatha SureClick) 140 mg/mL injection, INJECT 1 ML (140 MG) UNDER THE SKIN EVERY 14 (FOURTEEN) DAYS., Disp: 6 mL, Rfl: 3     "flash glucose sensor kit kit, USE AS INSTRUCTED TO CHECK BLOOD GLUCOSE, Disp: 6 each, Rfl: 3    FreeStyle Kassie reader (FreeStyle Kassie 2 [place]) misc, Use as instructed to check BG., Disp: 1 each, Rfl: 0    glucosamine-D3-Boswellia serr (Osteo Bi-Flex, 5-Loxin,) 1,500-400-100 mg-unit-mg tablet, Take by mouth., Disp: , Rfl:     insulin lispro (HumaLOG) 100 unit/mL injection, Inject 10-14 Units under the skin 3 times a day with meals. Take as directed per insulin instructions., Disp: 15 mL, Rfl: 10    Lantus Solostar U-100 Insulin 100 unit/mL (3 mL) pen, INJECT 70 UNITS UNDER THE SKIN EVERY NIGHT AT BEDTIME. TAKE AS DIRECTED PER INSULIN INSTRUCTIONS., Disp: 60 mL, Rfl: 1    loratadine (Claritin) 10 mg tablet, Take 1 tablet (10 mg) by mouth once daily at bedtime., Disp: 90 tablet, Rfl: 3    Lyrica 75 mg capsule, Take 1 capsule (75 mg) by mouth 2 times a day., Disp: 60 capsule, Rfl: 2    meloxicam (Mobic) 15 mg tablet, Take 1 tablet (15 mg) by mouth once daily. Take with food., Disp: 30 tablet, Rfl: 3    metFORMIN (Glucophage) 1,000 mg tablet, Take 1 tablet (1,000 mg) by mouth 2 times daily (morning and late afternoon)., Disp: 180 tablet, Rfl: 1    multivitamin tablet, Take 1 tablet by mouth once daily., Disp: , Rfl:     orphenadrine (Norflex) 100 mg 12 hr tablet, Take 1 tablet (100 mg) by mouth once daily., Disp: 90 tablet, Rfl: 1    pantoprazole (ProtoNix) 40 mg EC tablet, Take 1 tablet (40 mg) by mouth once daily., Disp: 90 tablet, Rfl: 1    pen needle, diabetic 32 gauge x 1/4\" needle, Use as directed to inject insulin 4 times per day, Disp: 300 each, Rfl: 5    predniSONE (Deltasone) 1 mg tablet, Take 4 tablets (4 mg) by mouth once daily., Disp: 135 tablet, Rfl: 6    pyridostigmine (Mestinon) 60 mg tablet, TAKE 1 & 1/2 (ONE AND ONE-HALF) TABLETS THREE TIMES DAILY, Disp: 135 tablet, Rfl: 5    semaglutide (Ozempic) 2 mg/dose (8 mg/3 mL) pen injector, Inject 2 mg under the skin 1 (one) time per week., Disp: 9 mL, Rfl: " "3    sucralfate (Carafate) 1 gram tablet, Take 1 tablet (1 g) by mouth once daily as needed (Stomach aches)., Disp: 90 tablet, Rfl: 1    tamsulosin (Flomax) 0.4 mg 24 hr capsule, Take 2 capsules (0.8 mg) by mouth once daily., Disp: 180 capsule, Rfl: 1    traMADol (Ultram) 50 mg tablet, TAKE 1 TABLET THREE TIMES DAILY, Disp: 90 tablet, Rfl: 0    Current Facility-Administered Medications:     methylPREDNISolone acetate (DEPO-Medrol) injection 80 mg, 80 mg, intramuscular, Once, Christopher D'Amico, DO    methylPREDNISolone acetate (DEPO-Medrol) injection 80 mg, 80 mg, intramuscular, Once, Dillan D'Amico, DO    ROS: Neg except HPI            OBJECTIVE            Physical exam  Estimated body mass index is 37.6 kg/m² as calculated from the following:    Height as of this encounter: 1.854 m (6' 1\").    Weight as of this encounter: 129 kg (285 lb).  Gen/psych: NAD, conversational, appropriate    Ambulation  Gait: antalgic  Assistive device: none  Spine  Lumbar tenderness: neg  Limited ROM: pos, with no radiation or pain with flex/ex, lateral bending  SLR test: neg    Focused MSK exam:   R Hip  Trendelenberg test: neg  Skin/incision: normal   Tenderness:  AIIS versus anterior iliac crest  Pain with log roll: neg  Stinchfield: neg  ROM: within expected range, nonpainful  Flexion: 90º  IR: 15º  ER: 70º  Abd: 40º    Neurovascular    Strength: 5/5 hip/knee/ankle flexion and extension  Sensory (L2-S1): SILT throughout lower extremity  Edema/stasis: 0-10mm pitting edema  Pulse: DP 1+, PT 1+         Imaging  7/11/2024 R hip radiographs (AP Pelvis, AP/Lateral) on my read: Joint space narrowing (moderate) with sclerosis.             ASSESSMENT & PLAN           Patient verbalized understanding of below A&P. All questions answered.  Right gluteus medius versus rectus tendinopathy  Shawn has mild to moderate right hip degenerative disease on his radiographs.  However, his exam does not correlate to an intra-articular source, and " his most recent 5/1/2024 intra-articular CSI provided little relief.  Rather, his exam is notable for TTP along the R AIIS versus anterior iliac crest (difficult to tell given habitus).  He has pain with Stinchfield but not with active abduction, leading me to think more likely rectus inflammation.  As first-line treatment, we provided a physical therapy prescription..  If 6-8 weeks of an established PT program does not help his pain, he is going to follow-up with his established provider, Dr. Gtz, for consideration of further treatment modalities.  I am happy to see him back anytime, but for now I do not feel that his pain is attributable to an intra-articular source.  PMH, PSH, other considerations discussed  Autoimmune (myasthenia gravis) dz increases risk of complications.  On chronic corticosteroids and pyridostigmine  DM and elective arthroplasty req A1c <7.5 (6.5 on 6/25/2024)  Myalgia and myositis  PCN allergy (anxiety, palpitations, rash, swelling)  BMI<40 but on spectrum of increased risk of surgical complications.  Higher lifetime risk of revision with index surgery at young age.  SDD: No absolute criteria exist, but pt meets general guidelines (BMI<40, no more than cane preop, no recent falls, home help, no cardopulm dz requiring postop monitoring, no untreated DARIA, no hx anesthesia issues, <=74yo)  Occupation: Retired ()  Hobbies: Watching TV, fishing, cooking, eating

## 2024-07-20 PROCEDURE — RXMED WILLOW AMBULATORY MEDICATION CHARGE

## 2024-07-24 ENCOUNTER — PHARMACY VISIT (OUTPATIENT)
Dept: PHARMACY | Facility: CLINIC | Age: 69
End: 2024-07-24
Payer: COMMERCIAL

## 2024-07-29 PROCEDURE — RXMED WILLOW AMBULATORY MEDICATION CHARGE

## 2024-07-31 ENCOUNTER — APPOINTMENT (OUTPATIENT)
Dept: PHARMACY | Facility: HOSPITAL | Age: 69
End: 2024-07-31
Payer: MEDICARE

## 2024-07-31 ENCOUNTER — PHARMACY VISIT (OUTPATIENT)
Dept: PHARMACY | Facility: CLINIC | Age: 69
End: 2024-07-31
Payer: COMMERCIAL

## 2024-07-31 DIAGNOSIS — E11.9 TYPE 2 DIABETES MELLITUS WITHOUT COMPLICATION, WITHOUT LONG-TERM CURRENT USE OF INSULIN (MULTI): ICD-10-CM

## 2024-07-31 PROCEDURE — RXMED WILLOW AMBULATORY MEDICATION CHARGE

## 2024-07-31 RX ORDER — INSULIN GLARGINE 100 [IU]/ML
INJECTION, SOLUTION SUBCUTANEOUS
Qty: 60 ML | Refills: 1 | Status: SHIPPED | OUTPATIENT
Start: 2024-07-31

## 2024-07-31 NOTE — PROGRESS NOTES
Pharmacist Clinic: Diabetes Management  Shawn Romero is a 69 y.o. male was referred to Clinical Pharmacy Team for diabetes management.     Referring Provider: Christopher D'Amico, DO     HISTORY OF PRESENT ILLNESS  Patient is an insulin dependent T2DM. Historically he was unable to afford meds due to cost. He is approved for the  PAP for ozempic and repatha.     LAB REVIEW   Glucose (mg/dL)   Date Value   2024 112 (H)   2024 99   2023 151 (H)   2023 131 (H)   10/07/2022 179 (H)     Hemoglobin A1C (%)   Date Value   2024 6.5 (H)   2024 6.7 (H)   2023 7.0 (A)   2023 8.4 (A)   10/07/2022 7.7 (A)     Bicarbonate (mmol/L)   Date Value   2024 30   2024 39 (H)   2023 36 (H)   2023 32   10/07/2022 31     Urea Nitrogen (mg/dL)   Date Value   2024 8   2024 19   2023 18   2023 18   10/07/2022 12     Creatinine (mg/dL)   Date Value   2024 0.85   2024 0.81   2023 0.92   2023 0.86   10/07/2022 0.89     Lab Results   Component Value Date    HGBA1C 6.5 (H) 2024    HGBA1C 6.7 (H) 2024    HGBA1C 7.0 (A) 2023     Lab Results   Component Value Date    CHOL 68 2024    CHOL 120 2024    CHOL 119 2023     Lab Results   Component Value Date    HDL 49.4 2024    HDL 54.2 2024    HDL 49.3 2023     Lab Results   Component Value Date    TRIG 177 (H) 2024    TRIG 148 2024    TRIG 189 (H) 2023     DIABETES ASSESSMENT    CURRENT PHARMACOTHERAPY  - metformin 1000 mg 1 by mouth twice daily   - lantus 100U/3ml 40 units nightly   - jardiance 10 mg daily    - humalog 10-14 units with meals 2-3 times per day  - ozempic 2 mg under the skin once weekly (injects on )    SECONDARY PREVENTION  - Statin? No; patient cannot tolerate statins, on PCSK9i  - ACE-I/ARB? No    SMBG:   - 7 day AV    DISCUSSION  - Patient is tolerating his medications without  issue  - He denies any side effects   - Discussed his labs look very good, he agrees, goal A1c is less than 7%    RECOMMENDATIONS/PLAN  1. Patients diabetes is well controlled with most recent A1c of 6.5 % (goal < 7 %).   - Continue all meds under the continuation of care with the referring providwer and clinical pharmacy team.    Clinical Pharmacist follow up: 3 months  [facility] PAP Approval: 4/16/2024    Thank you,  Janessa Caro, PharmD    Verbal consent to manage patient's drug therapy was obtained from the patient. They were informed they may decline to participate or withdraw from participation in pharmacy services at any time.

## 2024-08-01 ENCOUNTER — EVALUATION (OUTPATIENT)
Dept: PHYSICAL THERAPY | Facility: CLINIC | Age: 69
End: 2024-08-01
Payer: MEDICARE

## 2024-08-01 DIAGNOSIS — M25.551 RIGHT HIP PAIN: ICD-10-CM

## 2024-08-01 DIAGNOSIS — K21.9 GASTROESOPHAGEAL REFLUX DISEASE, UNSPECIFIED WHETHER ESOPHAGITIS PRESENT: ICD-10-CM

## 2024-08-01 DIAGNOSIS — N40.0 BENIGN PROSTATIC HYPERPLASIA, UNSPECIFIED WHETHER LOWER URINARY TRACT SYMPTOMS PRESENT: ICD-10-CM

## 2024-08-01 PROCEDURE — 97161 PT EVAL LOW COMPLEX 20 MIN: CPT | Mod: GP

## 2024-08-01 NOTE — PROGRESS NOTES
Physical Therapy    Physical Therapy Evaluation    Patient Name: Shawn Romero  MRN: [id number]  Today's Date: 8/1/2024    Time Entry:  Time Calculation  Start Time: 1330  Stop Time: 1400  Time Calculation (min): 30 min  PT Evaluation Time Entry  PT Evaluation (Low) Time Entry: 30                      Assessment   pt is a 69 year old M who participated in a physical therapy evaluation today due to R hip pain.  His  impairments include; tenderness, strength, pain,  ROM deficits, motor control]. Due to these impairments, pt has the following functional limitations Gait deviations, increased fall risk, difficulty with sleeping, stair negotiation, transfers, performing household/recreational/ occupational activities, and performing ADLs. Pt will benefit from continued skilled physical therapy to address above impairments and progress toward therapy related goals.     Discussed with pt in length regarding the benefits of aquatic therapy. Due to the patient having signficant pain with weight bearing activities I recommended to the patient that I feel like he would benefit most from aquatic exercises and then to progress to more of a land based exercise program.    Plan     POC transferred to Aquatic therapy at [facility] with Jarad Westfall PT     Current Problem  1. Right hip pain  Referral to Physical Therapy          Subjective   RFV: R Hip pain, pt reports significant pain with weight bearing activities. Has a difficult time with transfers, gait, stair negotiation, etc. Pt reports that he has been feeling this way for the past 6-8 months. Has received two injections in the hip- first one seemed to help for a few days, the second one had no affect.   Pain: 8/10  Precautions:  none   Acute/Chronic: chronic   Functional limitations  Medication/injections; tramadol, lyrica      Objective   Range of Motion:   P! With AROM/ PROM   Strength:   RLE >3/5  LLE: 4/5   Flexibility:    Tightness noted along hip flexors, limitation  with ER/IR   Palpation:   Tenderness noted along anterior lateral hip  Special Tests:   + Scour     OP EDUCATION:   Aquatic therapy     Goals:  No goals at this time, LUC PT

## 2024-08-05 ENCOUNTER — PHARMACY VISIT (OUTPATIENT)
Dept: PHARMACY | Facility: CLINIC | Age: 69
End: 2024-08-05
Payer: COMMERCIAL

## 2024-08-05 RX ORDER — TAMSULOSIN HYDROCHLORIDE 0.4 MG/1
0.8 CAPSULE ORAL DAILY
Qty: 180 CAPSULE | Refills: 1 | Status: SHIPPED | OUTPATIENT
Start: 2024-08-05 | End: 2025-02-01

## 2024-08-05 RX ORDER — TRAMADOL HYDROCHLORIDE 50 MG/1
50 TABLET ORAL 3 TIMES DAILY
Qty: 90 TABLET | Refills: 0 | Status: SHIPPED | OUTPATIENT
Start: 2024-08-05

## 2024-08-05 RX ORDER — PANTOPRAZOLE SODIUM 40 MG/1
40 TABLET, DELAYED RELEASE ORAL DAILY
Qty: 90 TABLET | Refills: 1 | Status: SHIPPED | OUTPATIENT
Start: 2024-08-05 | End: 2025-02-01

## 2024-08-21 ENCOUNTER — PHARMACY VISIT (OUTPATIENT)
Dept: PHARMACY | Facility: CLINIC | Age: 69
End: 2024-08-21
Payer: COMMERCIAL

## 2024-08-21 PROCEDURE — RXMED WILLOW AMBULATORY MEDICATION CHARGE

## 2024-08-26 NOTE — PROGRESS NOTES
Physical Therapy Treatment    Patient Name: Shawn Romero  MRN: [id number]  Encounter date:  8/27/2024  Time Calculation  Start Time: 1330  Stop Time: 1410  Time Calculation (min): 40 min     PT Therapeutic Procedures Time Entry  Aquatic Therapy Time Entry: 38    Visit Number:  2 (including evaluation)  Planned total visits: 10  Visits Authorized/Insurance Coverage:  [facility] MEDICARE ADV - NO AUTH / [place] / [place]20 COPAY / OOP $4500 - $491 met / [id number] ds 7/31/24.    Current Problem  Problem List Items Addressed This Visit    None  Visit Diagnoses         Codes    Right hip pain    -  Primary M25.551    Relevant Orders    Follow Up In Physical Therapy          Precautions:    DM- no submerging of pump right UE  Asthma  Left ulnar nerve transposition- no pushing or pulling  LBP       Pain  Right hip  3/10       Subjective  General  Patient in good spirits and motivated to initiate aquatics    Objective  WOMAC  57% impaired    Guarded WB right upon sit to stand; less so in water    Treatment:    Aquatic Therapy:   Patient entered pool via stairs  Warm up walking 4'  FWD 3 laps  BKWD 3 laps  LAT 3 laps    At railing 4'  Heel raises x 15  Toe raises x 15  Hip ABD x 15  Hip EXT x 15  SLR x 15  MIP x 15    Deep water with noodle  Hang x 1'  ABD jacks x 1'  Hang x 1'   marching x 1'  Hang x 1'  XXC x 1'    X 2 cool down laps in 4'      Current HEP:  Presently aquatics only    Activity tolerance:  good    OP EDUCATION:  Pace activity    Assessment:  Pt's response to treatment:  good  Areas of improvements:  exercise tolerance  Limitations/deficits:     Pain end of session:   No specific changes    Plan:   Continue with current POC with the following changes advance as able    Assessment of current progress against goals:  Progressing toward functional goals    Goals:  STG(3 visits)   Patient to tolerate 40 minutes of aquatic PT    LTG(10 visits)  WOMAC to <   Patient to perform ADLs with pain < 10  Patient  to walk for  minutes without need to sit

## 2024-08-27 ENCOUNTER — TREATMENT (OUTPATIENT)
Dept: PHYSICAL THERAPY | Facility: CLINIC | Age: 69
End: 2024-08-27
Payer: MEDICARE

## 2024-08-27 DIAGNOSIS — M25.551 RIGHT HIP PAIN: Primary | ICD-10-CM

## 2024-08-27 PROCEDURE — 97113 AQUATIC THERAPY/EXERCISES: CPT | Mod: GP

## 2024-08-30 ENCOUNTER — APPOINTMENT (OUTPATIENT)
Dept: PHYSICAL THERAPY | Facility: CLINIC | Age: 69
End: 2024-08-30
Payer: MEDICARE

## 2024-08-30 NOTE — PROGRESS NOTES
Physical Therapy Treatment    Patient Name: Shawn Romero  MRN: [id number]  Encounter date:  8/30/2024             Visit Number:  Visit count could not be calculated. Make sure you are using a visit which is associated with an episode. (including evaluation)  Planned total visits: 10  Visits Authorized/Insurance Coverage:  [facility] MEDICARE ADV - NO AUTH / [place] / $20 COPAY / OOP $4500 - $491 met / [id number] ds 7/31/24.    Current Problem  Problem List Items Addressed This Visit    None        Precautions:    DM- no submerging of pump right UE  Asthma  Left ulnar nerve transposition- no pushing or pulling  LBP          Pain       Subjective  General        ***    Objective  ***    Treatment:       Aquatic Therapy:   Patient entered pool via stairs  Warm up walking 4'  FWD 3 laps  BKWD 3 laps  LAT 3 laps     At railing 4'  Heel raises x 15  Toe raises x 15  Hip ABD x 15  Hip EXT x 15  SLR x 15  MIP x 15     Deep water with noodle  Hang x 1'  ABD jacks x 1'  Hang x 1'   marching x 1'  Hang x 1'  XXC x 1'     X 2 cool down laps in 4'        Current HEP:  Presently aquatics only  Activity tolerance:  {Activity:10837}    OP EDUCATION:       Assessment:     Pt's response to treatment:  ***  Areas of improvements:  ***  Limitations/deficits:  ***    Pain end of session: ***    Plan:     {BASPLAN:23732}    Assessment of current progress against goals:  {BASPTNOTEGOALASSESSMENT:86779}    Goals:  STG(3 visits)   Patient to tolerate 40 minutes of aquatic PT     LTG(10 visits)  WOMAC to <   Patient to perform ADLs with pain < 10  Patient to walk for  minutes without need to sit

## 2024-08-31 PROCEDURE — RXMED WILLOW AMBULATORY MEDICATION CHARGE

## 2024-09-03 ENCOUNTER — TREATMENT (OUTPATIENT)
Dept: PHYSICAL THERAPY | Facility: CLINIC | Age: 69
End: 2024-09-03
Payer: MEDICARE

## 2024-09-03 DIAGNOSIS — M54.50 ACUTE LOW BACK PAIN, UNSPECIFIED BACK PAIN LATERALITY, UNSPECIFIED WHETHER SCIATICA PRESENT: ICD-10-CM

## 2024-09-03 DIAGNOSIS — M25.551 RIGHT HIP PAIN: ICD-10-CM

## 2024-09-03 PROCEDURE — 97113 AQUATIC THERAPY/EXERCISES: CPT | Mod: GP,CQ

## 2024-09-03 RX ORDER — ORPHENADRINE CITRATE 100 MG/1
100 TABLET, EXTENDED RELEASE ORAL DAILY
Qty: 90 TABLET | Refills: 1 | Status: SHIPPED | OUTPATIENT
Start: 2024-09-03 | End: 2025-03-02

## 2024-09-03 NOTE — PROGRESS NOTES
"    Physical Therapy Treatment    Patient Name: Shawn Romero  MRN: [id number]  Encounter date:  9/3/2024  Time Calculation  Start Time: 1430  Stop Time: 1515  Time Calculation (min): 45 min     PT Therapeutic Procedures Time Entry  Aquatic Therapy Time Entry: 40    Visit Number:  3 (including evaluation)  Planned total visits: 10  Visits Authorized/Insurance Coverage:  [facility] MEDICARE ADV - NO AUTH / [place] / $20 COPAY / OOP $4500 - $491 met / REF: [facility]-[id number] / ds 7/31/24.    Current Problem  Problem List Items Addressed This Visit    None  Visit Diagnoses         Codes    Right hip pain     M25.551          Precautions:    DM- no submerging of pump right UE  Asthma  Left ulnar nerve transposition- no pushing or pulling  LBP        Pain   4/10 right hip  3-4/10 back pain       Subjective  General   \"I couldn't drive Friday,, too much pain, so I had to cancel.\"       Objective  Fair core control in deep end with noodle and BUE support   Step to ascent leading with RLE and heavy UE.     Treatment:    Aquatic Therapy:   Patient entered pool via stairs  Warm up walking 4' +  FWD 3 laps  BKWD 3 laps  LAT 3 laps     At railing 4'  Heel raises x 15  Toe raises x 15  Hip ABD x 15  Hip EXT x 15  SLR x 15  MIP x 15     Deep water with noodle 2' ea   Hang   ABD jacks   Hang   Marching (bicycle uncomfortable)  Hang  XXC   Hang      X 2 ea cool down laps in 4' fwd and lateral    Stretches at steps  Hamstrings and calf 20\"X 2        Current HEP:  Presently aquatics only      OP EDUCATION:       Assessment:   Patient is guarded in pool with movement. Pain down at exit.   Pt's response to treatment:  Fair     Limitations/deficits:  Hip and back pain limit ADL.     Pain end of session: \"Better than when I started.\"1/10 hip and 2/10     Plan:     Continue with current POC/no changes    Assessment of current progress against goals:  Insufficient treatment time to assess progress    Goals:  STG(3 visits)   Patient to tolerate " 40 minutes of aquatic PT     LTG(10 visits)  WOMAC to <   Patient to perform ADLs with pain < 10  Patient to walk for  minutes without need to sit  Active       PT Problem       pt will report no greater than 2/10 pain for 3 consecutive days         Start:  08/01/24    Expected End:  10/30/24            Pt will demonstrate symmetrical AROM in B/L LEs        Start:  08/01/24    Expected End:  10/30/24            Pt will demonstrate 4+/5 B/L UE/LE strength        Start:  08/01/24    Expected End:  10/30/24            Pt will be indep with HEP         Start:  08/01/24    Expected End:  10/30/24

## 2024-09-05 ENCOUNTER — PHARMACY VISIT (OUTPATIENT)
Dept: PHARMACY | Facility: CLINIC | Age: 69
End: 2024-09-05
Payer: COMMERCIAL

## 2024-09-05 NOTE — PROGRESS NOTES
"    Physical Therapy Treatment    Patient Name: Shawn Romero  MRN: [id number]  Encounter date:  9/6/2024  Time Calculation  Start Time: 1255  Stop Time: 1340  Time Calculation (min): 45 min     PT Therapeutic Procedures Time Entry  Aquatic Therapy Time Entry: 40    Visit Number:  4 (including evaluation)  Planned total visits: 10  Visits Authorized/Insurance Coverage:  EVAL ONLY-Auth Rcvd 20 visits 9/3-12/31 CPTs [phone]    Current Problem  Problem List Items Addressed This Visit    None  Visit Diagnoses         Codes    Right hip pain    -  Primary M25.551              Precautions:    DM- no submerging of pump right UE  Asthma  Left ulnar nerve transposition- no pushing or pulling  LBP       Pain  3-4/10  LBP    Subjective  General  Patient in good spirits.  Patient reports LBP>right hip pain.  Patient pleased with aquatics thus far.    Objective  Good core control in water    Treatment:    Aquatic Therapy:   Patient entered pool via stairs  Warm up walking 4' +  FWD 3 laps  BKWD 3 laps  LAT 3 laps     At railing 4'  Heel raises x 15  Toe raises x 15  Hip ABD x 15  Hip EXT x 15  SLR x 15  MIP x 15  mini squats x 15     Deep water with noodle 2' ea   Hang   ABD jacks   Hang   Marching (bicycle uncomfortable)  Hang  XXC   Hang      X 2 ea cool down laps in 4' fwd and lateral     Stretches at steps  Hamstrings and calf 20\"X 2      Current HEP:  Presently aquatics only     Activity tolerance:  good    OP EDUCATION:  Presently aquatics only    Assessment:  Pt's response to treatment:  good  Areas of improvements:  ease of mobility in water  Limitations/deficits:  LBP/right hip pain    Pain end of session:   Reduced in deep water    Plan:   Continue with current POC with the following changes increase as tolerated    Assessment of current progress against goals:  Progressing toward functional goals    Goals:  Active       PT Problem       pt will report no greater than 2/10 pain for 3 " consecutive days         Start:  08/01/24    Expected End:  10/30/24            Pt will demonstrate symmetrical AROM in B/L LEs        Start:  08/01/24    Expected End:  10/30/24            Pt will demonstrate 4+/5 B/L UE/LE strength        Start:  08/01/24    Expected End:  10/30/24            Pt will be indep with HEP         Start:  08/01/24    Expected End:  10/30/24

## 2024-09-06 ENCOUNTER — TREATMENT (OUTPATIENT)
Dept: PHYSICAL THERAPY | Facility: CLINIC | Age: 69
End: 2024-09-06
Payer: MEDICARE

## 2024-09-06 DIAGNOSIS — M25.551 RIGHT HIP PAIN: Primary | ICD-10-CM

## 2024-09-06 PROCEDURE — 97113 AQUATIC THERAPY/EXERCISES: CPT | Mod: GP

## 2024-09-09 ENCOUNTER — TREATMENT (OUTPATIENT)
Dept: PHYSICAL THERAPY | Facility: CLINIC | Age: 69
End: 2024-09-09
Payer: MEDICARE

## 2024-09-09 DIAGNOSIS — M25.551 RIGHT HIP PAIN: ICD-10-CM

## 2024-09-09 PROCEDURE — 97113 AQUATIC THERAPY/EXERCISES: CPT | Mod: GP,CQ

## 2024-09-09 NOTE — PROGRESS NOTES
"    Physical Therapy Treatment    Patient Name: Shawn Romero  MRN: [id number]  Encounter date:  9/9/2024  Time Calculation  Start Time: 1330  Stop Time: 1415  Time Calculation (min): 45 min     PT Therapeutic Procedures Time Entry  Aquatic Therapy Time Entry: 40    Visit Number:  5 (including evaluation)  Planned total visits: 10  Visits Authorized/Insurance Coverage:  EVAL ONLY-Auth Rcvd 20 visits 9/3-12/31 CPTs [id number] [phone]    Current Problem  Problem List Items Addressed This Visit    None  Visit Diagnoses         Codes    Right hip pain     M25.551          Precautions:    DM- no submerging of pump right UE  Asthma  Left ulnar nerve transposition- no pushing or pulling  LBP       Pain  0/10 right hip   2/10 left knee      Subjective  General  \"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 My knee is bugging me and my back was but it is okay right now. The hip is good.\"     Objective  Patient challenged my steps     Treatment:    Aquatic Therapy:   Patient entered pool via stairs  Warm up walking 4' +  -FWD 3 laps  -BKWD 3 laps  -LAT 3 laps     At railing 4' - Single UE   Heel raises x 15  Toe raises x 15  Hip ABD x 15  Hip EXT x 15  SLR x 15    Marching across pool 2 laps     mini squats x 15     Deep water with noodle 2' ea   Hang   ABD jacks   Hang   Marching (bicycle uncomfortable)  Hang  XXC   Hang      X 2 ea cool down laps in 4' fwd     Stretches at steps  Hamstrings and calf 20\"X 2 R/L   Hip flexor and quad stretch 20\"x 2 R/L     Step ups fwd and lateral x 10 R/L     Fwd laps x 2        Current HEP:  Presently aquatics only     Activity tolerance:  good    OP EDUCATION:  Presently aquatics only    Assessment:   The patient was introduced to low repetition step up in order to strengthen LE's. Pain up a little at exit.    Areas of improvements:  Improved stability and Improved strength  Limitations/deficits:  Pain limits ADL's     Pain end of session:  left knee worse. " 3-4/10     Plan:     Continue with current POC/no changes    Assessment of current progress against goals:  Progressing toward functional goals     Goals:  Active       PT Problem       pt will report no greater than 2/10 pain for 3 consecutive days         Start:  08/01/24    Expected End:  10/30/24            Pt will demonstrate symmetrical AROM in B/L LEs        Start:  08/01/24    Expected End:  10/30/24            Pt will demonstrate 4+/5 B/L UE/LE strength        Start:  08/01/24    Expected End:  10/30/24            Pt will be indep with HEP         Start:  08/01/24    Expected End:  10/30/24

## 2024-09-12 NOTE — PROGRESS NOTES
"    Physical Therapy Treatment    Patient Name: Shawn Romero  MRN: [id number]  Encounter date:  9/13/2024  Time Calculation  Start Time: 1252  Stop Time: 1335  Time Calculation (min): 43 min     PT Therapeutic Procedures Time Entry  Aquatic Therapy Time Entry: 40    Visit Number:  6 (including evaluation)  Planned total visits: 10  Visits Authorized/Insurance Coverage:  EVAL ONLY-Auth Rcvd 20 visits 9/3-12/31 CPTs [id number] [phone]    Current Problem  Problem List Items Addressed This Visit    None  Visit Diagnoses         Codes    Right hip pain    -  Primary M25.551            Precautions:    DM- no submerging of pump right UE  Asthma  Left ulnar nerve transposition- no pushing or pulling  LBP       Pain  3-4/10    Subjective  General  Patient reports knee soreness after exiting pool sidestepping.  Patient with chief complaint of right hip pain.    Objective  Guarded gait on deck; less so in water    Treatment:    Aquatic Therapy:   Patient entered pool via stairs  Warm up walking 4' +  -FWD 3 laps  -BKWD 3 laps  -LAT 3 laps     At railing 4' - Single UE   Heel raises x 15  Toe raises x 15  Hip ABD x 15  Hip EXT x 15  SLR x 15     Marching across pool 2 laps      mini squats x 15     Deep water with noodle 2' ea   Hang   ABD jacks   Hang   Marching (bicycle uncomfortable)  Hang  XXC   Hang      X 2 ea cool down laps in 4' fwd     Stretches at steps  Hamstrings and calf 20\"X 2 R/L   Hip flexor and quad stretch 20\"x 2 R/L      Step ups fwd and lateral x 10 R/L -hold    Current HEP:  Presently aquatics only    Activity tolerance:  good    OP EDUCATION:  HEP    Assessment:  Pt's response to treatment:  good  Areas of improvements:  ease of mobility in water  Limitations/deficits:  LBP    Pain end of session:   Reduced hip pain    Plan:  Continue with current POC with the following changes advance as able    Assessment of current progress against goals:  Progressing toward functional " goals    Goals:  Active       PT Problem       pt will report no greater than 2/10 pain for 3 consecutive days         Start:  08/01/24    Expected End:  10/30/24            Pt will demonstrate symmetrical AROM in B/L LEs        Start:  08/01/24    Expected End:  10/30/24            Pt will demonstrate 4+/5 B/L UE/LE strength        Start:  08/01/24    Expected End:  10/30/24            Pt will be indep with HEP         Start:  08/01/24    Expected End:  10/30/24

## 2024-09-13 ENCOUNTER — TREATMENT (OUTPATIENT)
Dept: PHYSICAL THERAPY | Facility: CLINIC | Age: 69
End: 2024-09-13
Payer: MEDICARE

## 2024-09-13 DIAGNOSIS — M25.551 RIGHT HIP PAIN: Primary | ICD-10-CM

## 2024-09-13 PROCEDURE — 97113 AQUATIC THERAPY/EXERCISES: CPT | Mod: GP

## 2024-09-25 ENCOUNTER — APPOINTMENT (OUTPATIENT)
Dept: PRIMARY CARE | Facility: CLINIC | Age: 69
End: 2024-09-25
Payer: MEDICARE

## 2024-09-25 VITALS
OXYGEN SATURATION: 95 % | HEIGHT: 73 IN | HEART RATE: 80 BPM | BODY MASS INDEX: 36.84 KG/M2 | DIASTOLIC BLOOD PRESSURE: 79 MMHG | SYSTOLIC BLOOD PRESSURE: 129 MMHG | WEIGHT: 278 LBS

## 2024-09-25 DIAGNOSIS — G70.00 OCULAR MYASTHENIA GRAVIS (MULTI): ICD-10-CM

## 2024-09-25 DIAGNOSIS — Z79.4 TYPE 2 DIABETES MELLITUS WITHOUT COMPLICATION, WITH LONG-TERM CURRENT USE OF INSULIN (MULTI): ICD-10-CM

## 2024-09-25 DIAGNOSIS — M54.50 CHRONIC LOW BACK PAIN, UNSPECIFIED BACK PAIN LATERALITY, UNSPECIFIED WHETHER SCIATICA PRESENT: ICD-10-CM

## 2024-09-25 DIAGNOSIS — I87.8 VENOUS STASIS: ICD-10-CM

## 2024-09-25 DIAGNOSIS — I10 HYPERTENSION, UNSPECIFIED TYPE: Primary | ICD-10-CM

## 2024-09-25 DIAGNOSIS — E11.9 TYPE 2 DIABETES MELLITUS WITHOUT COMPLICATION, WITH LONG-TERM CURRENT USE OF INSULIN (MULTI): ICD-10-CM

## 2024-09-25 DIAGNOSIS — G47.33 OSA (OBSTRUCTIVE SLEEP APNEA): ICD-10-CM

## 2024-09-25 DIAGNOSIS — K21.9 GASTROESOPHAGEAL REFLUX DISEASE, UNSPECIFIED WHETHER ESOPHAGITIS PRESENT: ICD-10-CM

## 2024-09-25 DIAGNOSIS — E78.5 HYPERLIPIDEMIA, UNSPECIFIED HYPERLIPIDEMIA TYPE: ICD-10-CM

## 2024-09-25 DIAGNOSIS — N40.0 BENIGN PROSTATIC HYPERPLASIA, UNSPECIFIED WHETHER LOWER URINARY TRACT SYMPTOMS PRESENT: ICD-10-CM

## 2024-09-25 DIAGNOSIS — G89.29 CHRONIC LOW BACK PAIN, UNSPECIFIED BACK PAIN LATERALITY, UNSPECIFIED WHETHER SCIATICA PRESENT: ICD-10-CM

## 2024-09-25 DIAGNOSIS — N52.9 ERECTILE DYSFUNCTION, UNSPECIFIED ERECTILE DYSFUNCTION TYPE: ICD-10-CM

## 2024-09-25 PROCEDURE — 3074F SYST BP LT 130 MM HG: CPT | Performed by: STUDENT IN AN ORGANIZED HEALTH CARE EDUCATION/TRAINING PROGRAM

## 2024-09-25 PROCEDURE — 3048F LDL-C <100 MG/DL: CPT | Performed by: STUDENT IN AN ORGANIZED HEALTH CARE EDUCATION/TRAINING PROGRAM

## 2024-09-25 PROCEDURE — 1160F RVW MEDS BY RX/DR IN RCRD: CPT | Performed by: STUDENT IN AN ORGANIZED HEALTH CARE EDUCATION/TRAINING PROGRAM

## 2024-09-25 PROCEDURE — 3060F POS MICROALBUMINURIA REV: CPT | Performed by: STUDENT IN AN ORGANIZED HEALTH CARE EDUCATION/TRAINING PROGRAM

## 2024-09-25 PROCEDURE — 3008F BODY MASS INDEX DOCD: CPT | Performed by: STUDENT IN AN ORGANIZED HEALTH CARE EDUCATION/TRAINING PROGRAM

## 2024-09-25 PROCEDURE — 3044F HG A1C LEVEL LT 7.0%: CPT | Performed by: STUDENT IN AN ORGANIZED HEALTH CARE EDUCATION/TRAINING PROGRAM

## 2024-09-25 PROCEDURE — 1159F MED LIST DOCD IN RCRD: CPT | Performed by: STUDENT IN AN ORGANIZED HEALTH CARE EDUCATION/TRAINING PROGRAM

## 2024-09-25 PROCEDURE — 99214 OFFICE O/P EST MOD 30 MIN: CPT | Performed by: STUDENT IN AN ORGANIZED HEALTH CARE EDUCATION/TRAINING PROGRAM

## 2024-09-25 PROCEDURE — G2211 COMPLEX E/M VISIT ADD ON: HCPCS | Performed by: STUDENT IN AN ORGANIZED HEALTH CARE EDUCATION/TRAINING PROGRAM

## 2024-09-25 PROCEDURE — 3078F DIAST BP <80 MM HG: CPT | Performed by: STUDENT IN AN ORGANIZED HEALTH CARE EDUCATION/TRAINING PROGRAM

## 2024-09-25 RX ORDER — MINOXIDIL 2.5 MG/1
2.5 TABLET ORAL DAILY
COMMUNITY

## 2024-09-25 RX ORDER — BETAMETHASONE DIPROPIONATE 0.5 MG/G
LOTION TOPICAL
COMMUNITY
Start: 2024-09-06

## 2024-09-25 RX ORDER — CLINDAMYCIN PHOSPHATE 10 UG/ML
LOTION TOPICAL 2 TIMES DAILY
COMMUNITY

## 2024-09-25 ASSESSMENT — COLUMBIA-SUICIDE SEVERITY RATING SCALE - C-SSRS
2. HAVE YOU ACTUALLY HAD ANY THOUGHTS OF KILLING YOURSELF?: NO
1. IN THE PAST MONTH, HAVE YOU WISHED YOU WERE DEAD OR WISHED YOU COULD GO TO SLEEP AND NOT WAKE UP?: NO
6. HAVE YOU EVER DONE ANYTHING, STARTED TO DO ANYTHING, OR PREPARED TO DO ANYTHING TO END YOUR LIFE?: NO

## 2024-09-25 ASSESSMENT — ENCOUNTER SYMPTOMS
DEPRESSION: 0
OCCASIONAL FEELINGS OF UNSTEADINESS: 0
LOSS OF SENSATION IN FEET: 0

## 2024-09-25 ASSESSMENT — PATIENT HEALTH QUESTIONNAIRE - PHQ9
SUM OF ALL RESPONSES TO PHQ9 QUESTIONS 1 AND 2: 0
1. LITTLE INTEREST OR PLEASURE IN DOING THINGS: NOT AT ALL
2. FEELING DOWN, DEPRESSED OR HOPELESS: NOT AT ALL

## 2024-09-25 NOTE — PROGRESS NOTES
69-year-old male presenting for follow-up of multiple concerns.    DMII  Stable, doing well on current regimen. Continues to follow with [facility] pharmacist.     HTN  Currently not taking any medications, asymptomatic     HLD  Taking Repatha, tolerating well.     Chronic low back pain, chronic knee pain  Stable, tolerates current regimen well.  Doing aqua therapy, doing well with that.     ED  Stable     GERD  Stable     Ocular MG  Stable, following with neuro.     DARIA  Reports perfect compliance with PAP therapy, doing well    SocHx:   - Smoking: About 25 pack years, quit 12 years ago    12 point ROS reviewed and negative other than as stated in HPI      General: Alert, oriented, pleasant, in no acute distress  HEENT:      Head: normocephalic, atraumatic;      eyes: EOMI, no scleral icterus;   Neck: soft, supple, non-tender, no masses appreciated  CV: Heart with regular rate and rhythm, normal S1/S2, no murmurs  Lungs: CTAB without wheezing, rhonchi or rales; good respiratory effort, no increased work of breathing  Neuro: Cranial nerves grossly intact; alert and oriented, normal gait  Psych: Appropriate mood and affect    #DMII  -Last A1C: 6.7 date: 03/2024  -Last Albumin/Creat ration: WNL Date: 10/21/21  -Current Medications: Lantus 40 units at bedtime, Humalog 10- 14 units with meals, metformin 1000 mg twice daily, Jardiance 10 mg daily, Ozempic 2 mg weekly  -Statin: None, reports allergy to all statins  -ASA: None  -ACE/ARB: None  -Medication changes: None, continue to follow with [facility] pharmacist     #HTN  - At goal in office  -Currently unmedicated, advised to monitor at home     #HLD  -Continue Repatha, doing very well  -Repeat lipid at goal     #BPH  -Continue tamsulosin 0.4 mg 2 tablets daily, following with urology     #Chronic low back pain #chronic L knee pain  -CSA and UDS updated 06/2024  -Continue tramadol 50 mg 3 times daily as needed  -Continue Norflex as needed     #ED  -Continue tadalafil 20 mg as  needed     #GERD  -Continue pantoprazole 40 mg daily     #Ocular MG  -Continue to follow with neurology  -Current medications include prednisone 4 mg daily, pyridostigmine 60 mg 3 times daily     #DARIA  - Continue PAP therapy    #Venous stasis  - Already using compression stockings  - Vascular referral    F/U 3 months, Medicare in March    Chris D'Amico, DO

## 2024-09-27 ENCOUNTER — APPOINTMENT (OUTPATIENT)
Dept: PHYSICAL THERAPY | Facility: CLINIC | Age: 69
End: 2024-09-27
Payer: MEDICARE

## 2024-09-27 DIAGNOSIS — M25.551 RIGHT HIP PAIN: ICD-10-CM

## 2024-09-27 PROCEDURE — 97113 AQUATIC THERAPY/EXERCISES: CPT | Mod: GP,CQ

## 2024-09-27 NOTE — PROGRESS NOTES
"    Physical Therapy Treatment    Patient Name: Shawn Romero JR  MRN: [id number]  Encounter date:  9/27/2024  Time Calculation  Start Time: 1245  Stop Time: 1330  Time Calculation (min): 45 min     PT Therapeutic Procedures Time Entry  Aquatic Therapy Time Entry: 40    Visit Number:  7 (including evaluation)  Planned total visits: 10  Visits Authorized/Insurance Coverage:  EVAL ONLY-Auth Rcvd 20 visits 9/3-12/31 CPTs [phone]    Current Problem  Problem List Items Addressed This Visit    None  Visit Diagnoses         Codes    Right hip pain     M25.551          Precautions:    DM- no submerging of pump right UE  Asthma  Left ulnar nerve transposition- no pushing or pulling  LBP     Pain  4-5/10 back      Subjective  General  \"I did not take pain meds. I am waiting to see. The hip is better, about a 2/10\"       Objective  The patient ascends from pool     Treatment:    Aquatic Therapy:   Patient entered pool via stairs  Water walking 4' +  -FWD 3 laps  -BKWD 3 laps  -LAT 3 laps     At railing 4.5' depth  -Heel raises x 15 Single UE   -Toe raises x 15 Single UE   -Hip ABD x 15 - BUE  -Hip EXT x 15   -SLR x 15     Marching across pool 2 laps      mini squats x 15     7 ft - Deep water with noodle 2' ea   -Hang   -ABD jacks   -Hang   -bicycle  -Hang  -XXC   Hang      X 2 ea cool down laps in 4' fwd     Stretches at steps  Hamstrings and calf 20\"X 2 R/L   Hip flexor and quad stretch 20\"x 2 R/L       Current HEP:  Presently aquatics only    Activity tolerance:  good    OP EDUCATION:      Assessment:   Good response to session.   Areas of improvements:  Decreased pain  Limitations/deficits:  Weakness and Pain limits walking and standing.     Pain end of session:  2-3/10     Plan:     Continue with current POC/no changes    Assessment of current progress against goals:  Progressing toward functional goals     Goals:  Active       PT Problem       pt will report no greater than 2/10 pain for 3 " consecutive days         Start:  08/01/24    Expected End:  10/30/24            Pt will demonstrate symmetrical AROM in B/L LEs        Start:  08/01/24    Expected End:  10/30/24            Pt will demonstrate 4+/5 B/L UE/LE strength        Start:  08/01/24    Expected End:  10/30/24            Pt will be indep with HEP         Start:  08/01/24    Expected End:  10/30/24

## 2024-09-27 NOTE — PROGRESS NOTES
"    Physical Therapy Treatment    Patient Name: Shawn Romero JR  MRN: [id number]  Encounter date:  9/30/2024  Time Calculation  Start Time: 1240  Stop Time: 1325  Time Calculation (min): 45 min     PT Therapeutic Procedures Time Entry  Aquatic Therapy Time Entry: 40    Visit Number:  8 (including evaluation)  Planned total visits: 10  Visits Authorized/Insurance Coverage:  EVAL ONLY-Auth Rcvd 20 visits 9/3-12/31 CPTs [id number] [phone]    Current Problem  Problem List Items Addressed This Visit    None  Visit Diagnoses         Codes    Right hip pain    -  Primary M25.551          Precautions:    DM- no submerging of pump right UE  Asthma  Left ulnar nerve transposition- no pushing or pulling  LBP     Pain  2/10    Subjective  General  Patient in good spirits and motivated to participate.  Patient reports increased ease of ability to don/doff pants.    Objective  Improved gait symetry  Decreased pain with hip flexion    Treatment:    Aquatic Therapy:   Patient entered pool via stairs  Warm up walking 4' +  -FWD 3 laps  -BKWD 3 laps  -LAT 3 laps     At railing 4' - Single UE   Heel raises x 15  Toe raises x 15  Hip ABD x 15  Hip EXT x 15  SLR x 15     Marching across pool 2 laps      mini squats x 15     Deep water with noodle 2' ea   Hang   ABD jacks   Hang   Marching (bicycle uncomfortable)  Hang  XXC   Hang      X 2 ea cool down laps in 4' fwd     Stretches at steps  Hamstrings and calf 20\"X 2 R/L   Hip flexor and quad stretch 20\"x 2 R/L      Step ups fwd and lateral x 10 R/L -hold     Current HEP:  Presently aquatics only    Activity tolerance:  good    OP EDUCATION:  Pace activity    Assessment:  Pt's response to treatment:  good  Areas of improvements:  function  Limitations/deficits:  endurance    Pain end of session:   1/10    Plan:   Continue with current POC with the following changes advance as able    Assessment of current progress against goals:  Progressing toward functional " goals    Goals:  Active       PT Problem       pt will report no greater than 2/10 pain for 3 consecutive days         Start:  08/01/24    Expected End:  10/30/24            Pt will demonstrate symmetrical AROM in B/L LEs        Start:  08/01/24    Expected End:  10/30/24            Pt will demonstrate 4+/5 B/L UE/LE strength        Start:  08/01/24    Expected End:  10/30/24            Pt will be indep with HEP         Start:  08/01/24    Expected End:  10/30/24

## 2024-09-30 ENCOUNTER — APPOINTMENT (OUTPATIENT)
Dept: PHYSICAL THERAPY | Facility: CLINIC | Age: 69
End: 2024-09-30
Payer: MEDICARE

## 2024-09-30 DIAGNOSIS — M25.551 RIGHT HIP PAIN: Primary | ICD-10-CM

## 2024-09-30 PROCEDURE — 97113 AQUATIC THERAPY/EXERCISES: CPT | Mod: GP

## 2024-10-04 ENCOUNTER — APPOINTMENT (OUTPATIENT)
Dept: PHYSICAL THERAPY | Facility: CLINIC | Age: 69
End: 2024-10-04
Payer: MEDICARE

## 2024-10-04 DIAGNOSIS — M25.551 RIGHT HIP PAIN: ICD-10-CM

## 2024-10-04 PROCEDURE — 97113 AQUATIC THERAPY/EXERCISES: CPT | Mod: GP,CQ

## 2024-10-04 NOTE — PROGRESS NOTES
"    Physical Therapy Treatment    Patient Name: Shawn Romero JR  MRN: [id number]  Encounter date:  10/4/2024  Time Calculation  Start Time: 1250  Stop Time: 1335  Time Calculation (min): 45 min     PT Therapeutic Procedures Time Entry  Aquatic Therapy Time Entry: 40    Visit Number:  9 (including evaluation)  Planned total visits: 10  Visits Authorized/Insurance Coverage:  EVAL ONLY-Auth Rcvd 20 visits 9/3-12/31 CPTs [id number] [phone]    Current Problem  Problem List Items Addressed This Visit    None  Visit Diagnoses         Codes    Right hip pain     M25.551          Precautions:    DM- no submerging of pump right UE  Asthma  Left ulnar nerve transposition- no pushing or pulling  LBP     Pain  4-5/10 back and hip     Subjective  General  \"It's a lot easier to put my socks, pants and under ware on now since PT began.\"     Objective  Ascent from pool leading with LLE with BUE     Treatment:    Aquatic Therapy:   Patient entered pool via stairs  Warm up walking 4' +  -FWD 3 laps  -BKWD 3 laps  -LAT 3 laps     At railing 4' - Single UE   Heel raises x 15  Toe raises x 15  Hip ABD x 15  Hip EXT x 15  SLR x 15     Marching across pool 2 laps      mini squats x 15 - single UE      Deep water with noodle 2' ea   - Hang   - Sm ROM ABD jacks   - Hang   - bicycle (not uncomfortable today)  - Hang  - XXC   - Hang   -DKTC x10 to stretch      X 2 ea cool down laps in 4' fwd     Stretches at steps  Hamstrings and calf 20\"X 2 R/L   Hip flexor and quad stretch 20\"x 2 R/L        Current HEP:  Presently aquatics only    Activity tolerance:  good    OP EDUCATION:  Pace activity    Assessment:  Pt's response to treatment:  good  Areas of improvements:  LE ROM for ADL's   Limitations/deficits:  Back and hip pain with ADL's     Pain end of session: \"A little bit better.\"  2-3/10       Plan:   Continue with current POC with the following changes advance as able    Assessment of current progress against " goals:  Progressing toward functional goals    Goals:  Active       PT Problem       pt will report no greater than 2/10 pain for 3 consecutive days         Start:  08/01/24    Expected End:  10/30/24            Pt will demonstrate symmetrical AROM in B/L LEs        Start:  08/01/24    Expected End:  10/30/24            Pt will demonstrate 4+/5 B/L UE/LE strength        Start:  08/01/24    Expected End:  10/30/24            Pt will be indep with HEP         Start:  08/01/24    Expected End:  10/30/24

## 2024-10-07 ENCOUNTER — APPOINTMENT (OUTPATIENT)
Dept: PHYSICAL THERAPY | Facility: CLINIC | Age: 69
End: 2024-10-07
Payer: MEDICARE

## 2024-10-07 DIAGNOSIS — M25.551 RIGHT HIP PAIN: Primary | ICD-10-CM

## 2024-10-07 PROCEDURE — 97113 AQUATIC THERAPY/EXERCISES: CPT | Mod: GP

## 2024-10-07 NOTE — PROGRESS NOTES
".    Physical Therapy Treatment    Patient Name: Shawn Romero JR  MRN: [id number]  Encounter date:  10/7/2024  Time Calculation  Start Time: 1330  Stop Time: 1412  Time Calculation (min): 42 min     PT Therapeutic Procedures Time Entry  Aquatic Therapy Time Entry: 40    Visit Number:  10 (including evaluation)  Planned total visits: 10  Visits Authorized/Insurance Coverage:  EVAL ONLY-Auth Rcvd 20 visits 9/3-12/31 CPTs [id number] [phone]    Current Problem  Problem List Items Addressed This Visit    None  Visit Diagnoses         Codes    Right hip pain    -  Primary M25.551            Precautions:    DM- no submerging of pump right UE  Asthma  Left ulnar nerve transposition- no pushing or pulling  LBP          Pain  LBP/right hip pain  2/10    Right rip pain 5-6/10    Subjective  General  Patient reports improved right hip pain since beginning aquatics.  Patient able to don/doff shoes/socks with much greater ease.    Objective  AROM bilateral LE grossly WFL      Treatment:    Aquatic Therapy:   Patient entered pool via stairs  Warm up walking 4' +  -FWD 3 laps  -BKWD 3 laps  -LAT 3 laps     At railing 4' - Single UE   Heel raises x 15  Toe raises x 15  Hip ABD x 15  Hip EXT x 15  SLR x 15     Marching across pool 2 laps      mini squats x 15 - single UE      Deep water with noodle 2' ea   - Hang   - Sm ROM ABD jacks   - Hang   - bicycle  - Hang  - XXC   - Hang   -DKTC x10 to stretch      X 2 ea cool down laps in 4' fwd     Stretches at steps  Hamstrings and calf 20\"X 2 R/L   Hip flexor and quad stretch 20\"x 2 R/L         Current HEP:  Presently aquatics only     Activity tolerance:  good    OP EDUCATION:  HEP/pace activity    Assessment:  Patient responding favorably to above POC to improve function in gravity reduced atmosphere.  Pt's response to treatment:  good  Areas of improvements:  function  Limitations/deficits:  chronic pain    Pain end of session:   Reduction of symptoms in deep " water    Plan:  Continue with current POC with the following changes extend POC up to 4 additional visits    Assessment of current progress against goals:  Progressing toward functional goals    Goals:  Active       PT Problem       pt will report no greater than 2/10 pain for 3 consecutive days         Start:  08/01/24    Expected End:  10/30/24            Pt will demonstrate symmetrical AROM in B/L LEs        Start:  08/01/24    Expected End:  10/30/24            Pt will demonstrate 4+/5 B/L UE/LE strength        Start:  08/01/24    Expected End:  10/30/24            Pt will be indep with HEP         Start:  08/01/24    Expected End:  10/30/24

## 2024-10-24 ENCOUNTER — APPOINTMENT (OUTPATIENT)
Dept: NEUROLOGY | Facility: CLINIC | Age: 69
End: 2024-10-24
Payer: MEDICARE

## 2024-10-24 VITALS
SYSTOLIC BLOOD PRESSURE: 123 MMHG | DIASTOLIC BLOOD PRESSURE: 80 MMHG | WEIGHT: 286 LBS | HEIGHT: 73 IN | BODY MASS INDEX: 37.91 KG/M2 | HEART RATE: 75 BPM

## 2024-10-24 DIAGNOSIS — G70.00 OCULAR MYASTHENIA GRAVIS (MULTI): Primary | ICD-10-CM

## 2024-10-24 DIAGNOSIS — M79.2 NEUROPATHIC PAIN OF UPPER EXTREMITY: ICD-10-CM

## 2024-10-24 PROCEDURE — 3060F POS MICROALBUMINURIA REV: CPT | Performed by: PSYCHIATRY & NEUROLOGY

## 2024-10-24 PROCEDURE — 1159F MED LIST DOCD IN RCRD: CPT | Performed by: PSYCHIATRY & NEUROLOGY

## 2024-10-24 PROCEDURE — 3048F LDL-C <100 MG/DL: CPT | Performed by: PSYCHIATRY & NEUROLOGY

## 2024-10-24 PROCEDURE — 1160F RVW MEDS BY RX/DR IN RCRD: CPT | Performed by: PSYCHIATRY & NEUROLOGY

## 2024-10-24 PROCEDURE — 3079F DIAST BP 80-89 MM HG: CPT | Performed by: PSYCHIATRY & NEUROLOGY

## 2024-10-24 PROCEDURE — 3044F HG A1C LEVEL LT 7.0%: CPT | Performed by: PSYCHIATRY & NEUROLOGY

## 2024-10-24 PROCEDURE — 99214 OFFICE O/P EST MOD 30 MIN: CPT | Performed by: PSYCHIATRY & NEUROLOGY

## 2024-10-24 PROCEDURE — 3074F SYST BP LT 130 MM HG: CPT | Performed by: PSYCHIATRY & NEUROLOGY

## 2024-10-24 PROCEDURE — 3008F BODY MASS INDEX DOCD: CPT | Performed by: PSYCHIATRY & NEUROLOGY

## 2024-10-24 RX ORDER — PYRIDOSTIGMINE BROMIDE 60 MG/1
90 TABLET ORAL 3 TIMES DAILY
Qty: 135 TABLET | Refills: 11 | Status: SHIPPED | OUTPATIENT
Start: 2024-10-24

## 2024-10-24 NOTE — PATIENT INSTRUCTIONS
I'm glad to hear that you have been doing well.    I did not make medication changes today.  You have determined that prednisone cannot be tapered below 4 mg daily without significant worsening of your ocular symptoms.  Be sure you are continuing on vitamin D for bone health while taking prednisone.    I sent a refill order for pyridostigmine to your [facility].    Be sure to contact the office before the end of January for a refill on Lyrica.    I am providing a referral to another neuromuscular neurologist for ongoing management.  It would be reasonable to see them in 6-8 months.

## 2024-10-24 NOTE — PROGRESS NOTES
Subjective     Shawn Romero JR is a 69 y.o. year old male seen in follow-up for ocular myasthenia gravis, left upper extremity neuropathic pain.    HPI    69-year-old man with past medical history significant for type II diabetes, hyperlipidemia, obesity, GERD, osteoarthritis particularly including chronic left knee pain status post multiple orthopedic surgeries, remote former smoking.     I evaluated him initially on 3/14/2023.  See detailed in my ambulatory EMR note from that date he presented for ongoing management of ocular myasthenia and left upper extremity neuropathic pain status post ulnar neuropathy at the elbow surgery.  He was previously managed by Dr. Dee and transferred neurologic care to me because of Dr. Dee's skilled nursing.     He has history of myasthenia gravis dating to 2015 and has been evaluated by multiple neuro-ophthalmologist.  He has not had convincing symptoms of generalization and has been maintained on prednisone 4 mg daily (with worsening of diplopia at lower levels [place] (and pyridostigmine 90 mg 3 times daily.     His left upper extremity neuropathic pain has responded to brand-name Lyrica but did not respond to gabapentin or generic pregabalin.     Because of requirement for Lyrica I had him sign a controlled substance agreement and take the required urine drug screen test.     I evaluated him subsequently on 10/25/2023.  As detailed in my note from that date he indicated stability of myasthenia gravis and I continued his regiment of prednisone and Mestinon as above.  He indicated control of left upper extremity neuropathic pain on brand-name Lyrica 75 mg twice daily, which I continued.    I evaluated him most recently on 4/3/2024.  He appeared stable from the standpoint of ocular myasthenia gravis send I did not change his regimen of prednisone 4 mg daily and Mestinon 90 mg 3 times daily at that time.  I advised he continue daily vitamin D supplementation.  He reported good control of  left upper extremity neuropathic pain on pregabalin 75 mg twice daily which I continued.  I asked him to renew the controlled substance agreement at that time.    He is evaluated again today in the office.    He is doing reasonably well from the standpoint of ocular myasthenia.  His vision is a bit hard to focus first thing in the morning but not specifically diplopia.  He notes floaters OS.  He just had a recent eye exam which she reports was unremarkable.  He continues on Mestinon 90 mg 3 times daily and is tolerating it well; he and his wife had about 4 days of GI issues including diarrhea but they believe that was related to their city water, and otherwise he has not been bothered by GI symptoms.    He continues on prednisone 4 mg daily and reminds me that his ocular symptoms worsened considerably with any effort to taper below this dose.  He is taking vitamin D3 to the extent that he is allowed, indicating that high doses interfere with his continuous glucose monitor.    He endorses no major limiting ocular symptoms throughout the course of the day on the above regimen.  He denies any significant dysphagia; some time ago he had a procedure to try to remove a fishbone from his esophagus and this caused a tear and ever since then food can get stuck.  He denies nasal regurgitation of liquids.  He denies any definite dysarthria.  His neck feels strong to him and he denies head drop.  He perceives no limiting weakness of the limbs.  He denies dyspnea except with significant exertion, and does not endorse orthopnea.    His left upper extremity neuropathic pain is well-controlled with pregabalin 75 mg twice daily.  Apart from mild-moderate bilateral ankle edema for which he uses compression stockings he has not noted side effects of pregabalin.    He is doing pool therapy and his legs feel stronger.  The pool therapy has helped with hip pain as well.    He has managed to lose significant weight on Ozempic and  Jardiance.  He indicates he was up to 315 pounds and lately has been down to the 280s.    Review of Systems    As per the history of present illness    Patient Active Problem List   Diagnosis    Ocular myasthenia gravis (Multi)    Obesity, morbid (Multi)    Type 2 diabetes mellitus with other specified complication, unspecified whether long term insulin use (Multi)    BPH (benign prostatic hyperplasia)    Hyperlipidemia LDL goal <100    Erectile dysfunction    Elevated PSA    GERD (gastroesophageal reflux disease)    Achilles tendinitis of left lower extremity    Acquired short Achilles tendon of left lower extremity    Attention and concentration deficit    Chronic back pain    Bone spur of foot    Borderline glaucoma with ocular hypertension    Chondromalacia of knee, right    Convergence insufficiency    Cubital tunnel syndrome on left    Dermatitis, eczematoid    Type 2 diabetes mellitus without retinopathy (Multi)    Myasthenia gravis    Sesamoiditis    Vitamin B12 deficiency    Vitamin D deficiency    Weight gain    Ptosis of right eyelid    Pronation of both feet    Primary localized osteoarthrosis of left lower leg    Plantar fasciitis, left    Paralytic strabismus, sixth or abducens nerve palsy    Nicotine dependence in remission    Myalgia due to statin    Internal derangement of left knee    Insomnia    Inguinal bulge    Skin lesion    Hernia of anterior abdominal wall    Fatigue    Benign neoplasm of rectum and anal canal    Uncomplicated asthma (HHS-HCC)    Snoring    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    Long-term (current) use of injectable non-insulin antidiabetic drugs    Hypertension    History of colonic polyps    Diverticulosis of colon    Daytime somnolence    Benign neoplasm of transverse colon    Age-related nuclear cataract of both eyes    Hyperopia with regular astigmatism and presbyopia, bilateral    Squamous blepharitis of both upper and lower eyelid    Type 2 diabetes mellitus with diabetic  cataract     Past Medical History:   Diagnosis Date    Diverticulosis of large intestine without perforation or abscess without bleeding 06/15/2015    Diverticulosis of colon    Other conditions influencing health status 04/21/2014    Myalgia and myositis    Other muscle spasm 06/15/2015    Muscle spasm    Personal history of nicotine dependence 07/06/2016    History of nicotine dependence    Personal history of other specified conditions 04/21/2014    History of chest pain    Personal history of other specified conditions 04/21/2014    History of abdominal pain     Past Surgical History:   Procedure Laterality Date    HERNIA REPAIR  09/05/2013    Hernia Repair     Social History     Tobacco Use    Smoking status: Never    Smokeless tobacco: Not on file   Substance Use Topics    Alcohol use: Not on file     family history includes Arthritis in an other family member; Bell's palsy in his brother; Cancer in his father; Diabetes in his father; Heart disease in his father; Kidney disease in his father.    Current Outpatient Medications:     alpha lipoic acid 600 mg capsule, Take 1 capsule by mouth once daily., Disp: , Rfl:     ascorbic acid (Vitamin C) 1,000 mg tablet, Take 1 tablet (1,000 mg) by mouth once daily., Disp: , Rfl:     betamethasone dipropionate (Diprosone) 0.05 % lotion, apply to area TWICE DAILY AS NEEDED, Disp: , Rfl:     Blood glucose monitoring meter kit kit, 1 each if needed., Disp: , Rfl:     blood sugar diagnostic (OneTouch Ultra Test) strip, Use to check blood sugar level three times per day, Disp: 100 strip, Rfl: 3    cholecalciferol (Vitamin D-3) 25 MCG (1000 UT) tablet, Take 1 tablet (25 mcg) by mouth once daily., Disp: , Rfl:     clindamycin (Cleocin T) 1 % lotion, Apply topically 2 times a day., Disp: , Rfl:     cyanocobalamin, vitamin B-12, 3,000 mcg capsule, Take by mouth., Disp: , Rfl:     empagliflozin (Jardiance) 25 mg, Take 1 tablet (25 mg) by mouth once daily., Disp: 90 tablet, Rfl: 3    " evolocumab (Repatha SureClick) 140 mg/mL injection, INJECT 1 ML (140 MG) UNDER THE SKIN EVERY 14 (FOURTEEN) DAYS., Disp: 6 mL, Rfl: 3    flash glucose sensor kit kit, USE AS INSTRUCTED TO CHECK BLOOD GLUCOSE, Disp: 6 each, Rfl: 3    FreeStyle Kassie reader (FreeStyle Kassie 2 [place]) misc, Use as instructed to check BG., Disp: 1 each, Rfl: 0    glucosamine-D3-Boswellia serr (Osteo Bi-Flex, 5-Loxin,) 1,500-400-100 mg-unit-mg tablet, Take by mouth., Disp: , Rfl:     insulin lispro (HumaLOG) 100 unit/mL injection, Inject 10-14 Units under the skin 3 times a day with meals. Take as directed per insulin instructions., Disp: 15 mL, Rfl: 10    Lantus Solostar U-100 Insulin 100 unit/mL (3 mL) pen, INJECT 70 UNITS UNDER THE SKIN EVERY NIGHT AT BEDTIME. TAKE AS DIRECTED PER INSULIN INSTRUCTIONS., Disp: 60 mL, Rfl: 1    loratadine (Claritin) 10 mg tablet, Take 1 tablet (10 mg) by mouth once daily at bedtime., Disp: 90 tablet, Rfl: 3    Lyrica 75 mg capsule, Take 1 capsule (75 mg) by mouth 2 times a day., Disp: 60 capsule, Rfl: 2    meloxicam (Mobic) 15 mg tablet, Take 1 tablet (15 mg) by mouth once daily. Take with food., Disp: 30 tablet, Rfl: 3    metFORMIN (Glucophage) 1,000 mg tablet, TAKE 1 TABLET TWICE DAILY WITH MEALS, Disp: 180 tablet, Rfl: 1    minoxidil (Loniten) 2.5 mg tablet, Take 1 tablet (2.5 mg) by mouth once daily., Disp: , Rfl:     multivitamin tablet, Take 1 tablet by mouth once daily., Disp: , Rfl:     orphenadrine (Norflex) 100 mg 12 hr tablet, Take 1 tablet (100 mg) by mouth once daily., Disp: 90 tablet, Rfl: 1    pantoprazole (ProtoNix) 40 mg EC tablet, Take 1 tablet (40 mg) by mouth once daily., Disp: 90 tablet, Rfl: 1    pen needle, diabetic 32 gauge x 1/4\" needle, Use as directed to inject insulin 4 times per day, Disp: 300 each, Rfl: 5    predniSONE (Deltasone) 1 mg tablet, Take 4 tablets (4 mg) by mouth once daily., Disp: 135 tablet, Rfl: 6    pyridostigmine (Mestinon) 60 mg tablet, Take 1.5 tablets " (90 mg) by mouth 3 times a day., Disp: 135 tablet, Rfl: 2    semaglutide (Ozempic) 2 mg/dose (8 mg/3 mL) pen injector, Inject 2 mg under the skin 1 (one) time per week., Disp: 9 mL, Rfl: 3    sucralfate (Carafate) 1 gram tablet, Take 1 tablet (1 g) by mouth once daily as needed (Stomach aches)., Disp: 90 tablet, Rfl: 1    tamsulosin (Flomax) 0.4 mg 24 hr capsule, Take 2 capsules (0.8 mg) by mouth once daily., Disp: 180 capsule, Rfl: 1    traMADol (Ultram) 50 mg tablet, Take 1 tablet (50 mg) by mouth 3 times a day., Disp: 90 tablet, Rfl: 0    Current Facility-Administered Medications:     methylPREDNISolone acetate (DEPO-Medrol) injection 80 mg, 80 mg, intramuscular, Once, [place]opher D'Amico, DO    methylPREDNISolone acetate (DEPO-Medrol) injection 80 mg, 80 mg, intramuscular, Once, Dillan LOCK'Amico, DO  Allergies   Allergen Reactions    Hydrocodone-Acetaminophen Anxiety, Dizziness, Palpitations and Shortness of breath    Oxycodone-Acetaminophen Anxiety, Itching, Palpitations, Rash and Shortness of breath    Cholestyramine Bleeding    Shellfish Derived Unknown    Sulfa (Sulfonamide Antibiotics) Bleeding    Codeine Anxiety, Hives, Itching, Rash and Swelling    Iodine Anxiety, Diarrhea, Itching, Rash and Swelling    Penicillins Anxiety, Palpitations, Rash and Swelling    Statins-Hmg-Coa Reductase Inhibitors Rash    Sulfamethoxazole-Trimethoprim Itching, Rash and Swelling       Objective   Neurological Exam  Physical Exam    Physical Examination:    General: Alert man who was ambulatory without assistive devices.  Speaking comfortably in complete sentences without dyspnea or staccato.    Mental Status: Clear sensorium without fluctuation.  Appropriate in conversation.  Fluent unremarkable speech without paraphasic errors or hesitancy.    Cranial Nerves: Extraocular movements were intact and conjugate.  No ptosis.  Tight eyelid closure burying the lashes bilaterally.  Symmetric facial motor function.  No dysarthria  or dysphonia.    Motor: Confrontation strength was 5/5 at neck flexors and extensors and throughout the upper and lower extremities with the exception of 4+ left triceps and left hamstrings.    Sensation: Romberg sign was absent.    Station: Intact and stable.    Gait: Stable and unremarkable.      Assessment/Plan     From the standpoint of ocular myasthenia he appears stable.  We discussed that 4 mg is the floor dose of prednisone below which his symptoms exacerbate considerably.  Accordingly I recommended he continue this dose.  He will contact the office when he requires refills but I advised him to call before the end of January.  I sent refills for Mestinon 90 mg 3 times daily to his retail pharmacy.  I advised him to continue vitamin D3 for bone health while on prednisone.    He is on a CSA for pregabalin 75 mg twice daily which is adequately controlling his left upper extremity neuropathic pain.  He understands that the edema in his distal lower extremities may be at least in part related to pregabalin but does not wish to try reducing the dose as it has been very helpful for pain control.  Again I advised him to contact the office before the end of January for pregabalin refills.    I am providing a referral to a neuromuscular colleague for ongoing management as I anticipate leaving  in the near future.  I discussed this with him to determine a convenient location for him to be seen, as he has concerns about lengthy driving.

## 2024-10-30 ENCOUNTER — APPOINTMENT (OUTPATIENT)
Dept: PHARMACY | Facility: HOSPITAL | Age: 69
End: 2024-10-30
Payer: MEDICARE

## 2024-10-30 DIAGNOSIS — E78.5 HYPERLIPIDEMIA LDL GOAL <100: ICD-10-CM

## 2024-10-30 DIAGNOSIS — E11.9 TYPE 2 DIABETES MELLITUS WITHOUT COMPLICATION, WITHOUT LONG-TERM CURRENT USE OF INSULIN (MULTI): Primary | ICD-10-CM
